# Patient Record
Sex: FEMALE | Race: WHITE | NOT HISPANIC OR LATINO | Employment: FULL TIME | URBAN - METROPOLITAN AREA
[De-identification: names, ages, dates, MRNs, and addresses within clinical notes are randomized per-mention and may not be internally consistent; named-entity substitution may affect disease eponyms.]

---

## 2017-01-11 ENCOUNTER — TELEPHONE (OUTPATIENT)
Dept: INTERNAL MEDICINE | Facility: CLINIC | Age: 47
End: 2017-01-11

## 2017-01-11 DIAGNOSIS — K21.9 GASTROESOPHAGEAL REFLUX DISEASE WITHOUT ESOPHAGITIS: Primary | ICD-10-CM

## 2017-03-24 DIAGNOSIS — I10 BENIGN ESSENTIAL HTN: ICD-10-CM

## 2017-03-24 RX ORDER — IRBESARTAN 150 MG/1
150 TABLET ORAL NIGHTLY
Qty: 30 TABLET | Refills: 5 | Status: SHIPPED | OUTPATIENT
Start: 2017-03-24 | End: 2017-11-20 | Stop reason: SDUPTHER

## 2017-03-24 NOTE — TELEPHONE ENCOUNTER
----- Message from Angelita Castaneda MA sent at 3/24/2017  1:27 PM CDT -----  Contact: Patient  Analia Apodaca  MRN: [id number]  : 1970  PCP: Shital Loera  Home Phone      [phone]  Work Phone      Not on file.  Mobile          [phone]      MESSAGE: Patient is out of refills on Avapro.  Send to [facility]

## 2017-04-10 DIAGNOSIS — E66.9 OBESITY (BMI 30-39.9): ICD-10-CM

## 2017-04-10 NOTE — TELEPHONE ENCOUNTER
----- Message from Angelita Castaneda MA sent at 4/10/2017  1:14 PM CDT -----  Contact: Patient  Analia Apodaca  MRN: [id number]  : 1970  PCP: Shital Loera  Home Phone      [phone]  Work Phone      Not on file.  Mobile          [phone]      MESSAGE: Patient is out of refills on Contrave.  Send new script to [facility]

## 2017-05-29 ENCOUNTER — CLINICAL SUPPORT (OUTPATIENT)
Dept: INTERNAL MEDICINE | Facility: CLINIC | Age: 47
End: 2017-05-29
Payer: COMMERCIAL

## 2017-05-29 DIAGNOSIS — E04.1 THYROID NODULE: ICD-10-CM

## 2017-05-29 DIAGNOSIS — I10 BENIGN ESSENTIAL HTN: Primary | ICD-10-CM

## 2017-05-29 LAB
ALBUMIN SERPL BCP-MCNC: 3.7 G/DL
ALP SERPL-CCNC: 96 U/L
ALT SERPL W/O P-5'-P-CCNC: 18 U/L
ANION GAP SERPL CALC-SCNC: 9 MMOL/L
AST SERPL-CCNC: 26 U/L
BASOPHILS # BLD AUTO: 0.03 K/UL
BASOPHILS NFR BLD: 0.9 %
BILIRUB SERPL-MCNC: 0.3 MG/DL
BUN SERPL-MCNC: 9 MG/DL
CALCIUM SERPL-MCNC: 9 MG/DL
CHLORIDE SERPL-SCNC: 107 MMOL/L
CHOLEST/HDLC SERPL: 3 {RATIO}
CO2 SERPL-SCNC: 25 MMOL/L
CREAT SERPL-MCNC: 0.9 MG/DL
DIFFERENTIAL METHOD: ABNORMAL
EOSINOPHIL # BLD AUTO: 0.1 K/UL
EOSINOPHIL NFR BLD: 1.6 %
ERYTHROCYTE [DISTWIDTH] IN BLOOD BY AUTOMATED COUNT: 14.7 %
EST. GFR  (AFRICAN AMERICAN): >60 ML/MIN/1.73 M^2
EST. GFR  (NON AFRICAN AMERICAN): >60 ML/MIN/1.73 M^2
GLUCOSE SERPL-MCNC: 83 MG/DL
HCT VFR BLD AUTO: 38.5 %
HDL/CHOLESTEROL RATIO: 33.3 %
HDLC SERPL-MCNC: 138 MG/DL
HDLC SERPL-MCNC: 46 MG/DL
HGB BLD-MCNC: 12.3 G/DL
LDLC SERPL CALC-MCNC: 80.2 MG/DL
LYMPHOCYTES # BLD AUTO: 1.4 K/UL
LYMPHOCYTES NFR BLD: 43.5 %
MCH RBC QN AUTO: 26.8 PG
MCHC RBC AUTO-ENTMCNC: 31.9 %
MCV RBC AUTO: 84 FL
MONOCYTES # BLD AUTO: 0.4 K/UL
MONOCYTES NFR BLD: 12.7 %
NEUTROPHILS # BLD AUTO: 1.3 K/UL
NEUTROPHILS NFR BLD: 41.3 %
NONHDLC SERPL-MCNC: 92 MG/DL
PLATELET # BLD AUTO: 184 K/UL
PMV BLD AUTO: 10.6 FL
POTASSIUM SERPL-SCNC: 3.8 MMOL/L
PROT SERPL-MCNC: 6.9 G/DL
RBC # BLD AUTO: 4.59 M/UL
SODIUM SERPL-SCNC: 141 MMOL/L
TRIGL SERPL-MCNC: 59 MG/DL
TSH SERPL DL<=0.005 MIU/L-ACNC: 2.37 UIU/ML
WBC # BLD AUTO: 3.22 K/UL

## 2017-05-29 PROCEDURE — 36415 COLL VENOUS BLD VENIPUNCTURE: CPT

## 2017-05-29 PROCEDURE — 80053 COMPREHEN METABOLIC PANEL: CPT

## 2017-05-29 PROCEDURE — 85025 COMPLETE CBC W/AUTO DIFF WBC: CPT

## 2017-05-29 PROCEDURE — 84443 ASSAY THYROID STIM HORMONE: CPT

## 2017-05-29 PROCEDURE — 80061 LIPID PANEL: CPT

## 2017-05-29 PROCEDURE — 36415 COLL VENOUS BLD VENIPUNCTURE: CPT | Mod: S$GLB,,, | Performed by: NURSE PRACTITIONER

## 2017-06-05 ENCOUNTER — OFFICE VISIT (OUTPATIENT)
Dept: INTERNAL MEDICINE | Facility: CLINIC | Age: 47
End: 2017-06-05
Payer: COMMERCIAL

## 2017-06-05 VITALS
RESPIRATION RATE: 20 BRPM | BODY MASS INDEX: 33.91 KG/M2 | DIASTOLIC BLOOD PRESSURE: 76 MMHG | WEIGHT: 211 LBS | HEART RATE: 60 BPM | HEIGHT: 66 IN | SYSTOLIC BLOOD PRESSURE: 114 MMHG | OXYGEN SATURATION: 97 %

## 2017-06-05 DIAGNOSIS — T63.441A BEE STING, ACCIDENTAL OR UNINTENTIONAL, INITIAL ENCOUNTER: Primary | ICD-10-CM

## 2017-06-05 PROCEDURE — 99213 OFFICE O/P EST LOW 20 MIN: CPT | Mod: S$GLB,,, | Performed by: NURSE PRACTITIONER

## 2017-06-05 PROCEDURE — 96372 THER/PROPH/DIAG INJ SC/IM: CPT | Mod: S$GLB,,, | Performed by: NURSE PRACTITIONER

## 2017-06-05 PROCEDURE — 99999 PR PBB SHADOW E&M-EST. PATIENT-LVL III: CPT | Mod: PBBFAC,,, | Performed by: NURSE PRACTITIONER

## 2017-06-05 RX ORDER — METHSCOPOLAMINE BROMIDE 2.5 MG/1
TABLET ORAL
COMMUNITY
Start: 2017-05-17 | End: 2018-12-18

## 2017-06-05 RX ORDER — FAMOTIDINE 40 MG/1
TABLET, FILM COATED ORAL
COMMUNITY
Start: 2017-06-01 | End: 2017-12-04 | Stop reason: SDUPTHER

## 2017-06-05 RX ORDER — NALTREXONE HYDROCHLORIDE AND BUPROPION HYDROCHLORIDE 8; 90 MG/1; MG/1
TABLET, EXTENDED RELEASE ORAL
COMMUNITY
Start: 2017-05-17 | End: 2017-12-04 | Stop reason: SDUPTHER

## 2017-06-05 RX ORDER — METHYLPREDNISOLONE ACETATE 80 MG/ML
80 INJECTION, SUSPENSION INTRA-ARTICULAR; INTRALESIONAL; INTRAMUSCULAR; SOFT TISSUE
Status: COMPLETED | OUTPATIENT
Start: 2017-06-05 | End: 2017-06-05

## 2017-06-05 RX ADMIN — METHYLPREDNISOLONE ACETATE 80 MG: 80 INJECTION, SUSPENSION INTRA-ARTICULAR; INTRALESIONAL; INTRAMUSCULAR; SOFT TISSUE at 07:06

## 2017-06-05 NOTE — PROGRESS NOTES
Subjective:       Patient ID: Analia Apodaca is a 46 y.o. female.    Chief Complaint: Insect Bite (bee sting on L. leg x saturday) and Results    Patient is known, to me and presents with   Chief Complaint   Patient presents with    Insect Bite     bee sting on L. leg x saturday    Results   .  Denies chest pain and shortness of breath.  Patient presents with bee sting to left lower leg since yesterday  HPI  Review of Systems   Constitutional: Negative.    HENT: Negative.    Eyes: Negative.    Respiratory: Negative.    Cardiovascular: Negative.    Skin: Positive for color change.       Objective:      Physical Exam   Constitutional: She appears well-developed and well-nourished. No distress.   HENT:   Head: Normocephalic and atraumatic.   Right Ear: External ear normal.   Left Ear: External ear normal.   Nose: Nose normal.   Mouth/Throat: Oropharynx is clear and moist. No oropharyngeal exudate.   Eyes: Conjunctivae and EOM are normal. Pupils are equal, round, and reactive to light. Right eye exhibits no discharge. Left eye exhibits no discharge. No scleral icterus.   Neck: Normal range of motion. Neck supple. No JVD present. No tracheal deviation present. No thyromegaly present.   Cardiovascular: Normal rate, regular rhythm, normal heart sounds and intact distal pulses.    No murmur heard.  Pulmonary/Chest: Effort normal and breath sounds normal. No stridor. She has no wheezes.   Lymphadenopathy:     She has no cervical adenopathy.   Skin: Skin is warm and dry. Capillary refill takes less than 2 seconds. No rash noted. She is not diaphoretic. There is erythema. No pallor.        Left lower leg swelling with redness with no impetigo       Assessment:       No diagnosis found.    Plan:   There are no diagnoses linked to this encounter.

## 2017-06-05 NOTE — PATIENT INSTRUCTIONS
Insect Sting: Local Reaction   You have been stung or bitten by an insect. The insects venom or body fluid is causing your skin to react in the area where you were stung or bitten. This often causes redness, itching and swelling. This reaction will fade over a few hours, but it can last a few days. An insect bite or sting can become infected 1 to 3 days later, so watch for the signs below. Sometimes it is hard to tell the difference between a local reaction to the insect bite or sting and an early infection, so you may be given antibiotics.  Common insect stings causing problems are from wasps, bees, yellow jackets, and hornets. Common bites are from spiders, mosquitoes, fleas, or ticks. Other types of insects may be more common in different parts of the country or world.  Most people think of allergic reactions when someone has a rash or itchy skin. Symptoms can include:  · Rash, hives, redness, welts, or blisters  · Itching, burning, stinging, or pain  · Swelling around the sting area.  Sometimes swelling spreads to other areas.  Home care  Medicines  The healthcare provider may prescribe medicines to relieve swelling, itching, and pain. Follow the providers instructions when taking these medicines.  · If you had a severe reaction, the provider may prescribe an epinephrine kit. Epinephrine will stop an allergic reaction from getting worse. Before you leave the hospital, be sure that you understand when and how to use this medicine.  · Diphenhydramine is an oral antihistamine available at drugstores and groceries. Unless a prescription antihistamine was given, you can use this medicine to reduce itching if large areas of the skin are involved. The medicine may make you sleepy, so be careful using it in the daytime or when going to school, working, or driving. Dont use diphenhydramine if you have glaucoma or if you are a man with trouble urinating because of an enlarged prostate. Other antihistamines cause less  drowsiness and are good choices for daytime use. Ask your pharmacist for suggestions.  · Dont use diphenhydramine cream on your skin. In some people it can cause additional reaction and make you allergic to this medicine.  · Calamine lotion or oatmeal baths sometimes help with itching.  · You may use acetaminophen or ibuprofen to control pain, unless another pain medicine was prescribed. Talk with your healthcare provider before using these medicines if you have chronic liver or kidney disease. Also talk with your provider if youve had a stomach ulcer or GI bleeding.  General care    · If itching is a problem, dont take hot showers or baths. Stay out of direct sunlight. These heat up your skin and will make the itching worse.  · Use an ice pack to reduce local areas of redness and itching. You can make your own ice pack by putting ice cubes in a bag that seals and wrapping it in a thin towel. Dont put the ice directly on your skin, because it can damage the skin.  · Try not to scratch any affected areas and damage the skin. This will help prevent an infection.  · If oral antibiotics were prescribed, be sure to take them until finished.  Preventing future reactions  · Future reactions could be worse than this one, so try to stay away from places where you might be stung again.  · Be aware that honeybees nest in trees. Wasps and yellow jackets nest in the ground, trees, or roof eaves.  · If you are stung by a honeybee, a stinger will remain in your skin. Wasps, yellow jackets, and hornets dont leave a stinger behind. Move away from the nest area right away. The stinger of a honeybee releases a substance that will attract other bees to you. Once you are away from the nest, then remove the stinger as quickly as possible.  · After any sting, you may apply ice and take diphenhydramine or another antihistamine. If you develop any of the warning signs below, seek help right away.  · If you are at high risk for another  sting, or if your reaction included dizziness, fainting, or trouble breathing or swallowing, ask your doctor for an insect allergy kit.  · Remove any ticks on the skin with a set of fine tweezers.  the tick as close to the skin as possible. Pull back gently but firmly. Use an even, steady pressure. Dont jerk or twist. Dont squeeze, crush, or puncture the body of the tick. The bodily fluids may contain infection-causing germs. Dont use a smoldering match or cigarette, nail polish, petroleum jelly, liquid soap, or kerosene. These may irritate the tick. If any mouthparts of the tick remain in the skin, these should be left alone. They will fall off on their own. Trying to remove these parts may damage the skin unless they can be removed very easily. After the tick is removed, wash the bite area with rubbing alcohol, iodine, or soap and water.  Follow-up care  Follow up with your doctor in 2 days, or as advised, if your symptoms dont start to get better.  Call 911  Call 911 if any of these occur:  · Trouble breathing or swallowing, or wheezing  · New or worsening swelling in the mouth, throat, or tongue  · Hoarse voice or trouble speaking  · Confused  · Very drowsy or trouble awakening  · Fainting or loss of consciousness  · Rapid heart rate  · Low blood pressure  · Feeling of doom  · Nausea, vomiting, abdominal pain, or diarrhea  · Vomiting blood, or large amounts of blood in stool  · Seizure  When to seek medical advice  Call your healthcare provider right away if any of these occur:  · Spreading areas of itching, redness or swelling  · New or worse swelling in the face, eyelids, or  lips  · Dizziness or weakness  Also call your provider right away if you have signs of infection:  · Spreading redness  · Increased pain or swelling  · Fever of 100.4°F (38°C) or higher, or as directed by your healthcare provider  · Colored fluid draining from the sting area   Date Last Reviewed: 10/1/2016  © 2271-2514 [employer]. [address] [place] [address], [place] [address]. All rights reserved. This information is not intended as a substitute for professional medical care. Always follow your healthcare professional's instructions.

## 2017-08-21 ENCOUNTER — OFFICE VISIT (OUTPATIENT)
Dept: INTERNAL MEDICINE | Facility: CLINIC | Age: 47
End: 2017-08-21
Payer: COMMERCIAL

## 2017-08-21 ENCOUNTER — HOSPITAL ENCOUNTER (OUTPATIENT)
Dept: RADIOLOGY | Facility: HOSPITAL | Age: 47
Discharge: HOME | End: 2017-08-21
Attending: NURSE PRACTITIONER
Payer: COMMERCIAL

## 2017-08-21 VITALS
SYSTOLIC BLOOD PRESSURE: 116 MMHG | DIASTOLIC BLOOD PRESSURE: 80 MMHG | WEIGHT: 214 LBS | RESPIRATION RATE: 20 BRPM | OXYGEN SATURATION: 97 % | HEART RATE: 81 BPM | HEIGHT: 66 IN | BODY MASS INDEX: 34.39 KG/M2

## 2017-08-21 DIAGNOSIS — M25.562 ACUTE PAIN OF LEFT KNEE: Primary | ICD-10-CM

## 2017-08-21 DIAGNOSIS — M25.562 ACUTE PAIN OF LEFT KNEE: ICD-10-CM

## 2017-08-21 DIAGNOSIS — M25.462 SWELLING OF JOINT OF LEFT KNEE: ICD-10-CM

## 2017-08-21 PROCEDURE — 3079F DIAST BP 80-89 MM HG: CPT | Mod: S$GLB,,, | Performed by: NURSE PRACTITIONER

## 2017-08-21 PROCEDURE — 3074F SYST BP LT 130 MM HG: CPT | Mod: S$GLB,,, | Performed by: NURSE PRACTITIONER

## 2017-08-21 PROCEDURE — 73560 X-RAY EXAM OF KNEE 1 OR 2: CPT | Mod: 26,LT,, | Performed by: INTERNAL MEDICINE

## 2017-08-21 PROCEDURE — 3008F BODY MASS INDEX DOCD: CPT | Mod: S$GLB,,, | Performed by: NURSE PRACTITIONER

## 2017-08-21 PROCEDURE — 99999 PR PBB SHADOW E&M-EST. PATIENT-LVL III: CPT | Mod: PBBFAC,,, | Performed by: NURSE PRACTITIONER

## 2017-08-21 PROCEDURE — 99213 OFFICE O/P EST LOW 20 MIN: CPT | Mod: S$GLB,,, | Performed by: NURSE PRACTITIONER

## 2017-08-21 PROCEDURE — 96372 THER/PROPH/DIAG INJ SC/IM: CPT | Mod: S$GLB,,, | Performed by: NURSE PRACTITIONER

## 2017-08-21 PROCEDURE — 73560 X-RAY EXAM OF KNEE 1 OR 2: CPT | Mod: TC,LT

## 2017-08-21 RX ORDER — METHYLPREDNISOLONE ACETATE 80 MG/ML
80 INJECTION, SUSPENSION INTRA-ARTICULAR; INTRALESIONAL; INTRAMUSCULAR; SOFT TISSUE
Status: COMPLETED | OUTPATIENT
Start: 2017-08-21 | End: 2017-08-21

## 2017-08-21 RX ORDER — KETOROLAC TROMETHAMINE 30 MG/ML
60 INJECTION, SOLUTION INTRAMUSCULAR; INTRAVENOUS
Status: COMPLETED | OUTPATIENT
Start: 2017-08-21 | End: 2017-08-21

## 2017-08-21 RX ADMIN — METHYLPREDNISOLONE ACETATE 80 MG: 80 INJECTION, SUSPENSION INTRA-ARTICULAR; INTRALESIONAL; INTRAMUSCULAR; SOFT TISSUE at 08:08

## 2017-08-21 RX ADMIN — KETOROLAC TROMETHAMINE 60 MG: 30 INJECTION, SOLUTION INTRAMUSCULAR; INTRAVENOUS at 08:08

## 2017-08-21 NOTE — PROGRESS NOTES
Subjective:       Patient ID: Analia Apodaca is a 47 y.o. female.    Chief Complaint: Knee Pain (L. knee pain x 1 week - cpnstant pain PS:10)    Patient is known, to me and presents with   Chief Complaint   Patient presents with    Knee Pain     L. knee pain x 1 week - cpnstant pain PS:10   .  Denies chest pain and shortness of breath.  Patient presents with left knee pain since starting to run again. Had stress fracture to right knee a year ago and now with pain to left knee. She is afraid that it may be same on left knee  HPI  Review of Systems   Constitutional: Negative.    Respiratory: Negative.    Cardiovascular: Negative.    Musculoskeletal: Positive for arthralgias and joint swelling.   Skin: Negative.    Neurological: Negative.        Objective:      Physical Exam   Constitutional: She is oriented to person, place, and time. She appears well-developed and well-nourished. No distress.   Neck: Normal range of motion. Neck supple. No JVD present. No tracheal deviation present. No thyromegaly present.   Cardiovascular: Normal rate, regular rhythm and normal heart sounds.    No murmur heard.  Pulmonary/Chest: Effort normal and breath sounds normal. No stridor. She has no wheezes.   Musculoskeletal: She exhibits edema and tenderness. She exhibits no deformity.        Left knee: She exhibits decreased range of motion and swelling. Tenderness found. Lateral joint line tenderness noted.   Tenderness and edema palpated to left knee particularly lateral regions. Crepitus noted    Lymphadenopathy:     She has no cervical adenopathy.   Neurological: She is alert and oriented to person, place, and time. She has normal reflexes. She displays normal reflexes. No cranial nerve deficit. She exhibits normal muscle tone. Coordination normal.   Skin: Skin is warm and dry. Capillary refill takes less than 2 seconds. No rash noted. She is not diaphoretic. No erythema. No pallor.       Assessment:       1. Acute pain of left knee   "  2. Swelling of joint of left knee        Plan:   Analia was seen today for knee pain.    Diagnoses and all orders for this visit:    Acute pain of left knee  -     X-Ray Knee 1 or 2 View Left; Future  -     ketorolac injection 60 mg; Inject 60 mg into the muscle one time.  -     methylPREDNISolone acetate injection 80 mg; Inject 1 mL (80 mg total) into the muscle one time.    Swelling of joint of left knee  -     X-Ray Knee 1 or 2 View Left; Future  -     ketorolac injection 60 mg; Inject 60 mg into the muscle one time.  -     methylPREDNISolone acetate injection 80 mg; Inject 1 mL (80 mg total) into the muscle one time.    "This note will not be shared with the patient."  Will do x-ray to rule out stress fracture although I feel it is collateral ligament strain  Instructed on RICE  Continue with nsaid's for now  rtc as scheduled   "

## 2017-09-25 ENCOUNTER — OFFICE VISIT (OUTPATIENT)
Dept: INTERNAL MEDICINE | Facility: CLINIC | Age: 47
End: 2017-09-25
Payer: COMMERCIAL

## 2017-09-25 VITALS
HEART RATE: 95 BPM | OXYGEN SATURATION: 95 % | RESPIRATION RATE: 18 BRPM | DIASTOLIC BLOOD PRESSURE: 86 MMHG | TEMPERATURE: 100 F | HEIGHT: 66 IN | BODY MASS INDEX: 34.87 KG/M2 | SYSTOLIC BLOOD PRESSURE: 122 MMHG | WEIGHT: 217 LBS

## 2017-09-25 DIAGNOSIS — J98.8 VIRAL RESPIRATORY INFECTION: Primary | ICD-10-CM

## 2017-09-25 DIAGNOSIS — B97.89 VIRAL RESPIRATORY INFECTION: Primary | ICD-10-CM

## 2017-09-25 LAB
CTP QC/QA: YES
FLUAV AG NPH QL: NEGATIVE
FLUBV AG NPH QL: NEGATIVE

## 2017-09-25 PROCEDURE — 3074F SYST BP LT 130 MM HG: CPT | Mod: S$GLB,,, | Performed by: NURSE PRACTITIONER

## 2017-09-25 PROCEDURE — 3079F DIAST BP 80-89 MM HG: CPT | Mod: S$GLB,,, | Performed by: NURSE PRACTITIONER

## 2017-09-25 PROCEDURE — 3008F BODY MASS INDEX DOCD: CPT | Mod: S$GLB,,, | Performed by: NURSE PRACTITIONER

## 2017-09-25 PROCEDURE — 99213 OFFICE O/P EST LOW 20 MIN: CPT | Mod: S$GLB,,, | Performed by: NURSE PRACTITIONER

## 2017-09-25 PROCEDURE — 96372 THER/PROPH/DIAG INJ SC/IM: CPT | Mod: S$GLB,,, | Performed by: NURSE PRACTITIONER

## 2017-09-25 PROCEDURE — 99999 PR PBB SHADOW E&M-EST. PATIENT-LVL III: CPT | Mod: PBBFAC,,, | Performed by: NURSE PRACTITIONER

## 2017-09-25 PROCEDURE — 87804 INFLUENZA ASSAY W/OPTIC: CPT | Mod: QW,S$GLB,, | Performed by: NURSE PRACTITIONER

## 2017-09-25 RX ORDER — BENZONATATE 100 MG/1
100 CAPSULE ORAL 3 TIMES DAILY PRN
Qty: 30 CAPSULE | Refills: 0 | Status: SHIPPED | OUTPATIENT
Start: 2017-09-25 | End: 2017-10-05

## 2017-09-25 RX ORDER — METHYLPREDNISOLONE ACETATE 80 MG/ML
80 INJECTION, SUSPENSION INTRA-ARTICULAR; INTRALESIONAL; INTRAMUSCULAR; SOFT TISSUE
Status: COMPLETED | OUTPATIENT
Start: 2017-09-25 | End: 2017-09-25

## 2017-09-25 RX ADMIN — METHYLPREDNISOLONE ACETATE 80 MG: 80 INJECTION, SUSPENSION INTRA-ARTICULAR; INTRALESIONAL; INTRAMUSCULAR; SOFT TISSUE at 09:09

## 2017-09-25 NOTE — PROGRESS NOTES
Subjective:       Patient ID: Analia Apodaca is a 47 y.o. female.    Chief Complaint: Cough (x 2 days)    Patient is known, to me and presents with   Chief Complaint   Patient presents with    Cough     x 2 days   .  Denies chest pain and shortness of breath.  Presents with fever and dry cough since over the weekend.    HPI  Review of Systems   Constitutional: Positive for chills and fever.   HENT: Positive for congestion and postnasal drip.    Eyes: Negative.    Respiratory: Positive for cough.    Cardiovascular: Negative.    Skin: Negative.    Hematological: Negative.        Objective:      Physical Exam   Constitutional: She appears well-developed and well-nourished. No distress.   HENT:   Head: Normocephalic and atraumatic.   Right Ear: Tympanic membrane mobility is abnormal.   Left Ear: Tympanic membrane mobility is abnormal.   Nose: Mucosal edema present.   Mouth/Throat: No oropharyngeal exudate or posterior oropharyngeal erythema.   Eyes: Conjunctivae are normal. Right eye exhibits no discharge. Left eye exhibits no discharge. No scleral icterus.   Neck: Normal range of motion. Neck supple. No JVD present. No tracheal deviation present. No thyromegaly present.   Cardiovascular: Normal rate, regular rhythm and normal heart sounds.    No murmur heard.  Pulmonary/Chest: Effort normal and breath sounds normal. No stridor. She has no wheezes.   Lymphadenopathy:     She has no cervical adenopathy.   Skin: Skin is warm and dry. Capillary refill takes less than 2 seconds. No rash noted. She is not diaphoretic. No erythema. No pallor.       Assessment:       1. Viral respiratory infection        Plan:   Analia was seen today for cough.    Diagnoses and all orders for this visit:    Viral respiratory infection  -     POCT Influenza A/B  -     methylPREDNISolone acetate injection 80 mg; Inject 1 mL (80 mg total) into the muscle one time.  -     benzonatate (TESSALON) 100 MG capsule; Take 1 capsule (100 mg total) by  "mouth 3 (three) times daily as needed.    "This note will not be shared with the patient."  Keep hydrated  Flu is negative  rtc as scheduled  "

## 2017-10-11 ENCOUNTER — TELEPHONE (OUTPATIENT)
Dept: INTERNAL MEDICINE | Facility: CLINIC | Age: 47
End: 2017-10-11

## 2017-10-11 NOTE — TELEPHONE ENCOUNTER
----- Message from Angelita Castaneda MA sent at 10/11/2017 10:27 AM CDT -----  Contact: Patient  Analia Apodaca  MRN: [id number]  : 1970  PCP: Shital Loera  Home Phone      [phone]  Work Phone      Not on file.  Mobile          [phone]      MESSAGE: Patient is requesting a letter to go to her employer stating that she be allowed to wear [employer] tennis shoes to work due to Plantar Fasciitis. She says this is the only shoes she can wear to work in that does not cause any foot pain.    Please advise--[phone]

## 2017-10-12 NOTE — TELEPHONE ENCOUNTER
Type it up and I will sign (Routing comment) per YUNG Loera NP/LRLPN    Letter typed and ready for . Patient notified.

## 2017-10-16 ENCOUNTER — TELEPHONE (OUTPATIENT)
Dept: INTERNAL MEDICINE | Facility: CLINIC | Age: 47
End: 2017-10-16

## 2017-10-16 NOTE — TELEPHONE ENCOUNTER
----- Message from Angelita Castaneda MA sent at 10/16/2017  3:07 PM CDT -----  Contact: Patient  Analia Apodaca  MRN: [id number]  : 1970  PCP: Shital Loera  Home Phone      [phone]  Work Phone      Not on file.  Mobile          [phone]      MESSAGE: Patient was seen on 2017 for Viral URI. She says she has the same symptoms again.  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   She gutted her bathroom where there was insulation. Wants to know if Shital feels she should maybe have a chest xray. She is worried that something in her home is causing this.   Please Advise--[phone]

## 2017-10-17 NOTE — TELEPHONE ENCOUNTER
I don't see the need to expose her to unnecessary radiation and a chest x-ray really wouldn't  allergy related problems.  (Routing comment) per YUNG Loera NP/LRLPN.    Patient notified.

## 2017-11-08 ENCOUNTER — TELEPHONE (OUTPATIENT)
Dept: OBSTETRICS AND GYNECOLOGY | Facility: CLINIC | Age: 47
End: 2017-11-08

## 2017-11-08 DIAGNOSIS — Z12.31 ENCOUNTER FOR SCREENING MAMMOGRAM FOR BREAST CANCER: Primary | ICD-10-CM

## 2017-11-08 NOTE — TELEPHONE ENCOUNTER
----- Message from Geraldine Varner MA sent at 11/8/2017 11:20 AM CST -----  Contact: patient   Analia Apodaca  MRN: [id number]  Home Phone      [phone]  Work Phone      Not on file.  Mobile          [phone]    Patient Care Team:  Shital Loera NP as PCP - General (Family Medicine)  Yoshi Boucher MD as Obstetrician (Obstetrics)  OB? No  What phone number can you be reached at? [phone]  Message: Please link pt mammo order to mammo appt that is set for 12/14/2017 @ 8:30am.

## 2017-11-20 DIAGNOSIS — I10 BENIGN ESSENTIAL HTN: ICD-10-CM

## 2017-11-20 RX ORDER — IRBESARTAN 150 MG/1
TABLET ORAL
Qty: 30 TABLET | Refills: 4 | Status: SHIPPED | OUTPATIENT
Start: 2017-11-20 | End: 2018-05-14 | Stop reason: SDUPTHER

## 2017-12-04 ENCOUNTER — TELEPHONE (OUTPATIENT)
Dept: INTERNAL MEDICINE | Facility: CLINIC | Age: 47
End: 2017-12-04

## 2017-12-04 RX ORDER — FAMOTIDINE 40 MG/1
40 TABLET, FILM COATED ORAL NIGHTLY PRN
Qty: 30 TABLET | Refills: 5 | Status: SHIPPED | OUTPATIENT
Start: 2017-12-04 | End: 2018-12-18

## 2017-12-04 RX ORDER — NALTREXONE HYDROCHLORIDE AND BUPROPION HYDROCHLORIDE 8; 90 MG/1; MG/1
2 TABLET, EXTENDED RELEASE ORAL 2 TIMES DAILY
Qty: 120 TABLET | Refills: 2 | Status: SHIPPED | OUTPATIENT
Start: 2017-12-04 | End: 2018-03-19 | Stop reason: SDUPTHER

## 2017-12-04 NOTE — TELEPHONE ENCOUNTER
----- Message from Angelita Castaneda MA sent at 2017  3:04 PM CST -----  Contact: Patient  Analia Apodaca  MRN: [id number]  : 1970  PCP: Shital Loera  Home Phone      [phone]  Work Phone      Not on file.  Mobile          [phone]      MESSAGE: Patient needs refills on Contrave and Pepcid,   Send to [facility]

## 2017-12-14 ENCOUNTER — OFFICE VISIT (OUTPATIENT)
Dept: OBSTETRICS AND GYNECOLOGY | Facility: CLINIC | Age: 47
End: 2017-12-14
Payer: COMMERCIAL

## 2017-12-14 ENCOUNTER — HOSPITAL ENCOUNTER (OUTPATIENT)
Dept: RADIOLOGY | Facility: HOSPITAL | Age: 47
Discharge: HOME | End: 2017-12-14
Attending: OBSTETRICS & GYNECOLOGY
Payer: COMMERCIAL

## 2017-12-14 VITALS — BODY MASS INDEX: 34.87 KG/M2 | HEIGHT: 66 IN | WEIGHT: 217 LBS

## 2017-12-14 VITALS
WEIGHT: 219 LBS | DIASTOLIC BLOOD PRESSURE: 68 MMHG | HEIGHT: 66 IN | BODY MASS INDEX: 35.2 KG/M2 | HEART RATE: 80 BPM | SYSTOLIC BLOOD PRESSURE: 110 MMHG | RESPIRATION RATE: 18 BRPM

## 2017-12-14 DIAGNOSIS — Z12.31 ENCOUNTER FOR SCREENING MAMMOGRAM FOR BREAST CANCER: ICD-10-CM

## 2017-12-14 DIAGNOSIS — Z12.4 CERVICAL CANCER SCREENING: ICD-10-CM

## 2017-12-14 DIAGNOSIS — Z01.419 ENCOUNTER FOR GYNECOLOGICAL EXAMINATION: Primary | ICD-10-CM

## 2017-12-14 PROCEDURE — 77063 BREAST TOMOSYNTHESIS BI: CPT | Mod: 26,,, | Performed by: RADIOLOGY

## 2017-12-14 PROCEDURE — 88175 CYTOPATH C/V AUTO FLUID REDO: CPT

## 2017-12-14 PROCEDURE — 99396 PREV VISIT EST AGE 40-64: CPT | Mod: S$GLB,,, | Performed by: OBSTETRICS & GYNECOLOGY

## 2017-12-14 PROCEDURE — 77067 SCR MAMMO BI INCL CAD: CPT | Mod: TC

## 2017-12-14 PROCEDURE — 77067 SCR MAMMO BI INCL CAD: CPT | Mod: 26,,, | Performed by: RADIOLOGY

## 2017-12-14 PROCEDURE — 99999 PR PBB SHADOW E&M-EST. PATIENT-LVL III: CPT | Mod: PBBFAC,,, | Performed by: OBSTETRICS & GYNECOLOGY

## 2017-12-14 RX ORDER — PROMETHAZINE HYDROCHLORIDE AND DEXTROMETHORPHAN HYDROBROMIDE 6.25; 15 MG/5ML; MG/5ML
SYRUP ORAL
Refills: 0 | COMMUNITY
Start: 2017-11-24 | End: 2018-11-14

## 2017-12-14 RX ORDER — ALBUTEROL SULFATE 90 UG/1
AEROSOL, METERED RESPIRATORY (INHALATION)
Refills: 0 | COMMUNITY
Start: 2017-11-24 | End: 2018-12-18

## 2017-12-14 NOTE — PROGRESS NOTES
Subjective:       Patient ID: Analia Apodaca is a 47 y.o. female.    Chief Complaint:  Annual Exam (No complaints at present)      History of Present Illness  Patient presents for annual exam.  Patient Mitz to having a mammogram done this morning.  She is currently without GYN complaints.    Menstrual History:  OB History      Para Term  AB Living    1 1 1     1    SAB TAB Ectopic Multiple Live Births                      Menarche age:   Patient's last menstrual period was 2016.         Review of Systems  Review of Systems   Constitutional: Positive for fatigue. Negative for activity change, appetite change, chills, diaphoresis, fever and unexpected weight change.   HENT: Negative for congestion, dental problem, drooling, ear discharge, ear pain, facial swelling, hearing loss, mouth sores, nosebleeds, postnasal drip, rhinorrhea, sinus pressure, sneezing, sore throat, tinnitus, trouble swallowing and voice change.    Eyes: Negative for photophobia, pain, discharge, redness, itching and visual disturbance.   Respiratory: Positive for cough. Negative for apnea, choking, chest tightness, shortness of breath, wheezing and stridor.    Cardiovascular: Negative for chest pain, palpitations and leg swelling.   Gastrointestinal: Negative for abdominal distention, abdominal pain, anal bleeding, blood in stool, constipation, diarrhea, nausea, rectal pain and vomiting.   Endocrine: Negative for cold intolerance, heat intolerance, polydipsia, polyphagia and polyuria.   Genitourinary: Negative for decreased urine volume, difficulty urinating, dyspareunia, dysuria, enuresis, flank pain, frequency, genital sores, hematuria, menstrual problem, pelvic pain, urgency, vaginal bleeding, vaginal discharge and vaginal pain.   Musculoskeletal: Negative for arthralgias, back pain, gait problem, joint swelling, myalgias, neck pain and neck stiffness.   Skin: Negative for color change, pallor, rash and wound.    Allergic/Immunologic: Negative for environmental allergies, food allergies and immunocompromised state.   Neurological: Positive for headaches. Negative for dizziness, tremors, seizures, syncope, facial asymmetry, speech difficulty, weakness, light-headedness and numbness.   Hematological: Negative for adenopathy. Does not bruise/bleed easily.   Psychiatric/Behavioral: Negative for agitation, behavioral problems, confusion, decreased concentration, dysphoric mood, hallucinations, self-injury, sleep disturbance and suicidal ideas. The patient is not nervous/anxious and is not hyperactive.            Objective:    Physical Exam   Constitutional: She is oriented to person, place, and time. She appears well-developed and well-nourished.   Neck: No thyromegaly present.   Cardiovascular: Normal rate and regular rhythm.    Pulmonary/Chest: Effort normal and breath sounds normal. Right breast exhibits no inverted nipple, no mass, no nipple discharge, no skin change and no tenderness. Left breast exhibits no inverted nipple, no mass, no nipple discharge, no skin change and no tenderness. Breasts are symmetrical.   Abdominal: Soft. Bowel sounds are normal. She exhibits no mass. There is no tenderness. Hernia confirmed negative in the right inguinal area and confirmed negative in the left inguinal area.   Genitourinary: Vagina normal and uterus normal. Rectal exam shows no external hemorrhoid. No breast tenderness or discharge. Uterus is not enlarged and not tender. Cervix exhibits no motion tenderness, no discharge and no friability. Right adnexum displays no mass, no tenderness and no fullness. Left adnexum displays no mass, no tenderness and no fullness. No tenderness in the vagina. No foreign body in the vagina. No vaginal discharge found.   Musculoskeletal: Normal range of motion.   Lymphadenopathy:        Right: No inguinal adenopathy present.        Left: No inguinal adenopathy present.   Neurological: She is alert  and oriented to person, place, and time. She has normal reflexes.   Skin: Skin is dry.   Psychiatric: She has a normal mood and affect. Her behavior is normal. Judgment and thought content normal.   Nursing note and vitals reviewed.        Assessment:        1. Encounter for gynecological examination    2. Cervical cancer screening               Plan:        Analia was seen today for annual exam.    Diagnoses and all orders for this visit:    Encounter for gynecological examination    Cervical cancer screening  -     Liquid-based pap smear, screening

## 2018-03-19 RX ORDER — NALTREXONE HYDROCHLORIDE AND BUPROPION HYDROCHLORIDE 8; 90 MG/1; MG/1
TABLET, EXTENDED RELEASE ORAL
Qty: 120 TABLET | Refills: 1 | Status: SHIPPED | OUTPATIENT
Start: 2018-03-19 | End: 2018-11-14

## 2018-04-19 ENCOUNTER — TELEPHONE (OUTPATIENT)
Dept: INTERNAL MEDICINE | Facility: CLINIC | Age: 48
End: 2018-04-19

## 2018-04-19 NOTE — TELEPHONE ENCOUNTER
This is viral and antibx will not work. I suggest taking claritin and flonase at this time. Take tylenol and alternate with motrin for fever.

## 2018-04-19 NOTE — TELEPHONE ENCOUNTER
----- Message from Angelita Castaneda MA sent at 2018  8:32 AM CDT -----  Contact: patient  Analia Apodaca  MRN: [id number]  : 1970  PCP: Shital Loera  Home Phone      [phone]  Work Phone      Not on file.  Mobile          [phone]      MESSAGE:   Pt requesting meds for sorethroat and fever.   Patient is working out of town and will not be back until tonite, cannot come in for a visit.   Pharmacy name and location:  [facility]  Comments:      Phone:  [phone]

## 2018-04-24 ENCOUNTER — LAB VISIT (OUTPATIENT)
Dept: LAB | Facility: HOSPITAL | Age: 48
End: 2018-04-24
Attending: INTERNAL MEDICINE
Payer: COMMERCIAL

## 2018-04-24 DIAGNOSIS — Z79.899 HIGH RISK MEDICATION USE: Primary | ICD-10-CM

## 2018-04-24 LAB
ALBUMIN SERPL BCP-MCNC: 3.6 G/DL
ALP SERPL-CCNC: 83 U/L
ALT SERPL W/O P-5'-P-CCNC: 16 U/L
ANION GAP SERPL CALC-SCNC: 8 MMOL/L
AST SERPL-CCNC: 14 U/L
BILIRUB SERPL-MCNC: 0.6 MG/DL
BUN SERPL-MCNC: 12 MG/DL
CALCIUM SERPL-MCNC: 9.2 MG/DL
CHLORIDE SERPL-SCNC: 108 MMOL/L
CHOLEST SERPL-MCNC: 153 MG/DL
CHOLEST/HDLC SERPL: 3.1 {RATIO}
CO2 SERPL-SCNC: 27 MMOL/L
CREAT SERPL-MCNC: 0.9 MG/DL
EST. GFR  (AFRICAN AMERICAN): >60 ML/MIN/1.73 M^2
EST. GFR  (NON AFRICAN AMERICAN): >60 ML/MIN/1.73 M^2
GLUCOSE SERPL-MCNC: 95 MG/DL
HDLC SERPL-MCNC: 50 MG/DL
HDLC SERPL: 32.7 %
LDLC SERPL CALC-MCNC: 90.2 MG/DL
NONHDLC SERPL-MCNC: 103 MG/DL
POTASSIUM SERPL-SCNC: 3.6 MMOL/L
PROT SERPL-MCNC: 6.6 G/DL
SODIUM SERPL-SCNC: 143 MMOL/L
TRIGL SERPL-MCNC: 64 MG/DL
TSH SERPL DL<=0.005 MIU/L-ACNC: 1.5 UIU/ML

## 2018-04-24 PROCEDURE — 84443 ASSAY THYROID STIM HORMONE: CPT

## 2018-04-24 PROCEDURE — 80061 LIPID PANEL: CPT

## 2018-04-24 PROCEDURE — 36415 COLL VENOUS BLD VENIPUNCTURE: CPT

## 2018-04-24 PROCEDURE — 80053 COMPREHEN METABOLIC PANEL: CPT

## 2018-05-14 DIAGNOSIS — I10 BENIGN ESSENTIAL HTN: ICD-10-CM

## 2018-05-14 RX ORDER — IRBESARTAN 150 MG/1
TABLET ORAL
Qty: 30 TABLET | Refills: 3 | Status: SHIPPED | OUTPATIENT
Start: 2018-05-14 | End: 2018-09-25 | Stop reason: SDUPTHER

## 2018-09-25 DIAGNOSIS — I10 BENIGN ESSENTIAL HTN: ICD-10-CM

## 2018-09-25 RX ORDER — IRBESARTAN 150 MG/1
TABLET ORAL
Qty: 30 TABLET | Refills: 3 | Status: SHIPPED | OUTPATIENT
Start: 2018-09-25 | End: 2019-02-12 | Stop reason: SDUPTHER

## 2018-11-06 ENCOUNTER — TELEPHONE (OUTPATIENT)
Dept: OBSTETRICS AND GYNECOLOGY | Facility: CLINIC | Age: 48
End: 2018-11-06

## 2018-11-06 DIAGNOSIS — Z12.31 ENCOUNTER FOR SCREENING MAMMOGRAM FOR BREAST CANCER: Primary | ICD-10-CM

## 2018-11-06 NOTE — TELEPHONE ENCOUNTER
----- Message from Kristina Loyola MA sent at 11/6/2018  1:09 PM CST -----  Contact: self  Analia Apodaca  MRN: [id number]  Home Phone      [phone]  Work Phone      Not on file.  Mobile          [phone]    Patient Care Team:  Shital Loera NP as PCP - General (Family Medicine)  Yoshi Boucher MD as Obstetrician (Obstetrics)  OB? No  What phone number can you be reached at?  [phone]  Message:   Please link mammo orders to appt 12/18/18.  Thanks!

## 2018-11-14 ENCOUNTER — OFFICE VISIT (OUTPATIENT)
Dept: INTERNAL MEDICINE | Facility: CLINIC | Age: 48
End: 2018-11-14
Payer: COMMERCIAL

## 2018-11-14 VITALS
RESPIRATION RATE: 18 BRPM | WEIGHT: 220 LBS | TEMPERATURE: 98 F | SYSTOLIC BLOOD PRESSURE: 114 MMHG | HEART RATE: 85 BPM | HEIGHT: 66 IN | OXYGEN SATURATION: 99 % | BODY MASS INDEX: 35.36 KG/M2 | DIASTOLIC BLOOD PRESSURE: 80 MMHG

## 2018-11-14 DIAGNOSIS — H92.01 RIGHT EAR PAIN: Primary | ICD-10-CM

## 2018-11-14 PROCEDURE — 3008F BODY MASS INDEX DOCD: CPT | Mod: CPTII,S$GLB,, | Performed by: INTERNAL MEDICINE

## 2018-11-14 PROCEDURE — 3074F SYST BP LT 130 MM HG: CPT | Mod: CPTII,S$GLB,, | Performed by: INTERNAL MEDICINE

## 2018-11-14 PROCEDURE — 99999 PR PBB SHADOW E&M-EST. PATIENT-LVL III: CPT | Mod: PBBFAC,,, | Performed by: INTERNAL MEDICINE

## 2018-11-14 PROCEDURE — 99213 OFFICE O/P EST LOW 20 MIN: CPT | Mod: S$GLB,,, | Performed by: INTERNAL MEDICINE

## 2018-11-14 PROCEDURE — 3079F DIAST BP 80-89 MM HG: CPT | Mod: CPTII,S$GLB,, | Performed by: INTERNAL MEDICINE

## 2018-11-14 RX ORDER — OMEPRAZOLE 40 MG/1
40 CAPSULE, DELAYED RELEASE ORAL DAILY
COMMUNITY
End: 2021-02-18
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
## 2018-12-18 ENCOUNTER — OFFICE VISIT (OUTPATIENT)
Dept: OBSTETRICS AND GYNECOLOGY | Facility: CLINIC | Age: 48
End: 2018-12-18
Payer: COMMERCIAL

## 2018-12-18 VITALS
WEIGHT: 213 LBS | HEIGHT: 66 IN | HEART RATE: 79 BPM | SYSTOLIC BLOOD PRESSURE: 102 MMHG | DIASTOLIC BLOOD PRESSURE: 62 MMHG | RESPIRATION RATE: 12 BRPM | BODY MASS INDEX: 34.23 KG/M2

## 2018-12-18 DIAGNOSIS — Z01.419 ENCOUNTER FOR GYNECOLOGICAL EXAMINATION WITHOUT ABNORMAL FINDING: Primary | ICD-10-CM

## 2018-12-18 DIAGNOSIS — Z78.0 POSTMENOPAUSAL STATE: ICD-10-CM

## 2018-12-18 DIAGNOSIS — Z12.4 CERVICAL CANCER SCREENING: ICD-10-CM

## 2018-12-18 PROCEDURE — 88175 CYTOPATH C/V AUTO FLUID REDO: CPT

## 2018-12-18 PROCEDURE — 3074F SYST BP LT 130 MM HG: CPT | Mod: CPTII,S$GLB,, | Performed by: OBSTETRICS & GYNECOLOGY

## 2018-12-18 PROCEDURE — 99999 PR PBB SHADOW E&M-EST. PATIENT-LVL III: CPT | Mod: PBBFAC,,, | Performed by: OBSTETRICS & GYNECOLOGY

## 2018-12-18 PROCEDURE — 99396 PREV VISIT EST AGE 40-64: CPT | Mod: S$GLB,,, | Performed by: OBSTETRICS & GYNECOLOGY

## 2018-12-18 PROCEDURE — 3078F DIAST BP <80 MM HG: CPT | Mod: CPTII,S$GLB,, | Performed by: OBSTETRICS & GYNECOLOGY

## 2018-12-18 NOTE — PROGRESS NOTES
Subjective:       Patient ID: Analia Apodaca is a 48 y.o. female.    Chief Complaint:  Well Woman      History of Present Illness   patient presents for annual exam.  Patient is scheduled have a mammogram done next week.  She is otherwise without gyn complaints.    Menstrual History:  OB History      Para Term  AB Living    1 1 1     1    SAB TAB Ectopic Multiple Live Births                      Menarche age:   Patient's last menstrual period was 2016.         Review of Systems  Review of Systems   Constitutional: Negative for activity change, appetite change, chills, diaphoresis, fatigue, fever and unexpected weight change.   HENT: Negative for congestion, dental problem, drooling, ear discharge, ear pain, facial swelling, hearing loss, mouth sores, nosebleeds, postnasal drip, rhinorrhea, sinus pressure, sneezing, sore throat, tinnitus, trouble swallowing and voice change.    Eyes: Negative for photophobia, pain, discharge, redness, itching and visual disturbance.   Respiratory: Negative for apnea, cough, choking, chest tightness, shortness of breath, wheezing and stridor.    Cardiovascular: Negative for chest pain, palpitations and leg swelling.   Gastrointestinal: Negative for abdominal distention, abdominal pain, anal bleeding, blood in stool, constipation, diarrhea, nausea, rectal pain and vomiting.   Endocrine: Negative for cold intolerance, heat intolerance, polydipsia, polyphagia and polyuria.   Genitourinary: Negative for decreased urine volume, difficulty urinating, dyspareunia, dysuria, enuresis, flank pain, frequency, genital sores, hematuria, menstrual problem, pelvic pain, urgency, vaginal bleeding, vaginal discharge and vaginal pain.   Musculoskeletal: Negative for arthralgias, back pain, gait problem, joint swelling, myalgias, neck pain and neck stiffness.   Skin: Negative for color change, pallor, rash and wound.   Allergic/Immunologic: Negative for environmental allergies, food  allergies and immunocompromised state.   Neurological: Negative for dizziness, tremors, seizures, syncope, facial asymmetry, speech difficulty, weakness, light-headedness, numbness and headaches.   Hematological: Negative for adenopathy. Does not bruise/bleed easily.   Psychiatric/Behavioral: Negative for agitation, behavioral problems, confusion, decreased concentration, dysphoric mood, hallucinations, self-injury, sleep disturbance and suicidal ideas. The patient is not nervous/anxious and is not hyperactive.            Objective:      Physical Exam   Constitutional: She is oriented to person, place, and time. She appears well-developed and well-nourished.   Neck: No thyromegaly present.   Cardiovascular: Normal rate and regular rhythm.   Pulmonary/Chest: Effort normal and breath sounds normal. Right breast exhibits no inverted nipple, no mass, no nipple discharge, no skin change and no tenderness. Left breast exhibits no inverted nipple, no mass, no nipple discharge, no skin change and no tenderness. Breasts are symmetrical.   Abdominal: Soft. Bowel sounds are normal. She exhibits no mass. There is no tenderness. Hernia confirmed negative in the right inguinal area and confirmed negative in the left inguinal area.   Genitourinary: Vagina normal and uterus normal. Rectal exam shows no external hemorrhoid. No breast tenderness or discharge. Uterus is not enlarged and not tender. Cervix exhibits no motion tenderness, no discharge and no friability. Right adnexum displays no mass, no tenderness and no fullness. Left adnexum displays no mass, no tenderness and no fullness. No tenderness in the vagina. No foreign body in the vagina. No vaginal discharge found.   Musculoskeletal: Normal range of motion.   Lymphadenopathy:        Right: No inguinal adenopathy present.        Left: No inguinal adenopathy present.   Neurological: She is alert and oriented to person, place, and time. She has normal reflexes.   Skin: Skin is  dry.   Psychiatric: She has a normal mood and affect. Her behavior is normal. Judgment and thought content normal.   Nursing note and vitals reviewed.          Assessment:        1. Encounter for gynecological examination without abnormal finding    2. Cervical cancer screening    3. Postmenopausal state                Plan:         Analia was seen today for well woman.    Diagnoses and all orders for this visit:    Encounter for gynecological examination without abnormal finding    Cervical cancer screening  -     Liquid-based pap smear, screening    Postmenopausal state

## 2019-01-10 ENCOUNTER — HOSPITAL ENCOUNTER (OUTPATIENT)
Dept: RADIOLOGY | Facility: HOSPITAL | Age: 49
Discharge: HOME | End: 2019-01-10
Attending: OBSTETRICS & GYNECOLOGY
Payer: COMMERCIAL

## 2019-01-10 DIAGNOSIS — Z12.31 ENCOUNTER FOR SCREENING MAMMOGRAM FOR BREAST CANCER: ICD-10-CM

## 2019-01-10 PROCEDURE — 77067 MAMMO DIGITAL SCREENING BILAT WITH TOMOSYNTHESIS_CAD: ICD-10-PCS | Mod: 26,,, | Performed by: RADIOLOGY

## 2019-01-10 PROCEDURE — 77063 MAMMO DIGITAL SCREENING BILAT WITH TOMOSYNTHESIS_CAD: ICD-10-PCS | Mod: 26,,, | Performed by: RADIOLOGY

## 2019-01-10 PROCEDURE — 77063 BREAST TOMOSYNTHESIS BI: CPT | Mod: 26,,, | Performed by: RADIOLOGY

## 2019-01-10 PROCEDURE — 77067 SCR MAMMO BI INCL CAD: CPT | Mod: TC

## 2019-01-10 PROCEDURE — 77067 SCR MAMMO BI INCL CAD: CPT | Mod: 26,,, | Performed by: RADIOLOGY

## 2019-01-11 ENCOUNTER — TELEPHONE (OUTPATIENT)
Dept: RADIOLOGY | Facility: HOSPITAL | Age: 49
End: 2019-01-11

## 2019-01-21 ENCOUNTER — HOSPITAL ENCOUNTER (OUTPATIENT)
Dept: RADIOLOGY | Facility: HOSPITAL | Age: 49
Discharge: HOME | End: 2019-01-21
Attending: OBSTETRICS & GYNECOLOGY
Payer: COMMERCIAL

## 2019-01-21 DIAGNOSIS — R92.8 ABNORMAL MAMMOGRAM: ICD-10-CM

## 2019-01-21 PROCEDURE — 77061 BREAST TOMOSYNTHESIS UNI: CPT | Mod: 26,LT,, | Performed by: RADIOLOGY

## 2019-01-21 PROCEDURE — 77061 BREAST TOMOSYNTHESIS UNI: CPT | Mod: TC,LT

## 2019-01-21 PROCEDURE — 77065 DX MAMMO INCL CAD UNI: CPT | Mod: TC,LT

## 2019-01-21 PROCEDURE — 77065 DX MAMMO INCL CAD UNI: CPT | Mod: 26,LT,, | Performed by: RADIOLOGY

## 2019-01-21 PROCEDURE — 77065 MAMMO DIGITAL DIAGNOSTIC LEFT WITH TOMOSYNTHESIS_CAD: ICD-10-PCS | Mod: 26,LT,, | Performed by: RADIOLOGY

## 2019-01-21 PROCEDURE — 77061 MAMMO DIGITAL DIAGNOSTIC LEFT WITH TOMOSYNTHESIS_CAD: ICD-10-PCS | Mod: 26,LT,, | Performed by: RADIOLOGY

## 2019-01-28 ENCOUNTER — HOSPITAL ENCOUNTER (OUTPATIENT)
Dept: RADIOLOGY | Facility: HOSPITAL | Age: 49
Discharge: HOME | End: 2019-01-28
Attending: NURSE PRACTITIONER
Payer: COMMERCIAL

## 2019-01-28 ENCOUNTER — OFFICE VISIT (OUTPATIENT)
Dept: INTERNAL MEDICINE | Facility: CLINIC | Age: 49
End: 2019-01-28
Payer: COMMERCIAL

## 2019-01-28 VITALS
HEART RATE: 102 BPM | OXYGEN SATURATION: 97 % | TEMPERATURE: 101 F | BODY MASS INDEX: 33.75 KG/M2 | DIASTOLIC BLOOD PRESSURE: 70 MMHG | SYSTOLIC BLOOD PRESSURE: 112 MMHG | WEIGHT: 210 LBS | HEIGHT: 66 IN | RESPIRATION RATE: 16 BRPM

## 2019-01-28 DIAGNOSIS — J11.1 INFLUENZA-LIKE SYNDROME: Primary | ICD-10-CM

## 2019-01-28 DIAGNOSIS — J11.1 INFLUENZA-LIKE SYNDROME: ICD-10-CM

## 2019-01-28 PROCEDURE — 3074F SYST BP LT 130 MM HG: CPT | Mod: CPTII,S$GLB,, | Performed by: NURSE PRACTITIONER

## 2019-01-28 PROCEDURE — 99213 PR OFFICE/OUTPT VISIT, EST, LEVL III, 20-29 MIN: ICD-10-PCS | Mod: S$GLB,,, | Performed by: NURSE PRACTITIONER

## 2019-01-28 PROCEDURE — 3078F PR MOST RECENT DIASTOLIC BLOOD PRESSURE < 80 MM HG: ICD-10-PCS | Mod: CPTII,S$GLB,, | Performed by: NURSE PRACTITIONER

## 2019-01-28 PROCEDURE — 3008F BODY MASS INDEX DOCD: CPT | Mod: CPTII,S$GLB,, | Performed by: NURSE PRACTITIONER

## 2019-01-28 PROCEDURE — 3074F PR MOST RECENT SYSTOLIC BLOOD PRESSURE < 130 MM HG: ICD-10-PCS | Mod: CPTII,S$GLB,, | Performed by: NURSE PRACTITIONER

## 2019-01-28 PROCEDURE — 3078F DIAST BP <80 MM HG: CPT | Mod: CPTII,S$GLB,, | Performed by: NURSE PRACTITIONER

## 2019-01-28 PROCEDURE — 99999 PR PBB SHADOW E&M-EST. PATIENT-LVL IV: CPT | Mod: PBBFAC,,, | Performed by: NURSE PRACTITIONER

## 2019-01-28 PROCEDURE — 71046 X-RAY EXAM CHEST 2 VIEWS: CPT | Mod: TC

## 2019-01-28 PROCEDURE — 99999 PR PBB SHADOW E&M-EST. PATIENT-LVL IV: ICD-10-PCS | Mod: PBBFAC,,, | Performed by: NURSE PRACTITIONER

## 2019-01-28 PROCEDURE — 99213 OFFICE O/P EST LOW 20 MIN: CPT | Mod: S$GLB,,, | Performed by: NURSE PRACTITIONER

## 2019-01-28 PROCEDURE — 3008F PR BODY MASS INDEX (BMI) DOCUMENTED: ICD-10-PCS | Mod: CPTII,S$GLB,, | Performed by: NURSE PRACTITIONER

## 2019-01-28 RX ORDER — OSELTAMIVIR PHOSPHATE 75 MG/1
75 CAPSULE ORAL 2 TIMES DAILY
Qty: 10 CAPSULE | Refills: 0 | Status: SHIPPED | OUTPATIENT
Start: 2019-01-28 | End: 2019-02-02

## 2019-01-28 NOTE — PATIENT INSTRUCTIONS

## 2019-01-29 ENCOUNTER — TELEPHONE (OUTPATIENT)
Dept: INTERNAL MEDICINE | Facility: CLINIC | Age: 49
End: 2019-01-29

## 2019-01-29 DIAGNOSIS — R91.8 GROUND GLASS OPACITY PRESENT ON IMAGING OF LUNG: Primary | ICD-10-CM

## 2019-01-29 NOTE — TELEPHONE ENCOUNTER
----- Message from Angelita Castaneda MA sent at 1/29/2019  8:14 AM CST -----  Contact: Patient  Patient is calling for results of chest xray from yesterday.     Please Advise.

## 2019-01-29 NOTE — TELEPHONE ENCOUNTER
What is showing on her x-ray is not so much of an infection but shows that she has some opacities in her lower lung fields. Usually this is seen in someone that has been exposed to certain things such as asbestos or other chemicals that can cause this. I would like to set her up with pulmonology since I don't know if she has been having this for some time or this is new. We can set her up with [facility]ology

## 2019-01-29 NOTE — TELEPHONE ENCOUNTER
Pt notified of dr note/recommendation of results, voiced understanding staing she is good with [facility] just need referral put in  please advise  Thanks!

## 2019-02-12 DIAGNOSIS — I10 BENIGN ESSENTIAL HTN: ICD-10-CM

## 2019-02-12 RX ORDER — IRBESARTAN 150 MG/1
TABLET ORAL
Qty: 30 TABLET | Refills: 3 | Status: SHIPPED | OUTPATIENT
Start: 2019-02-12 | End: 2019-06-25 | Stop reason: SDUPTHER

## 2019-03-25 RX ORDER — FAMOTIDINE 40 MG/1
TABLET, FILM COATED ORAL
Qty: 30 TABLET | Refills: 5 | Status: SHIPPED | OUTPATIENT
Start: 2019-03-25 | End: 2020-02-11

## 2019-05-03 ENCOUNTER — OFFICE VISIT (OUTPATIENT)
Dept: INTERNAL MEDICINE | Facility: CLINIC | Age: 49
End: 2019-05-03
Payer: COMMERCIAL

## 2019-05-03 ENCOUNTER — HOSPITAL ENCOUNTER (OUTPATIENT)
Dept: RADIOLOGY | Facility: HOSPITAL | Age: 49
Discharge: HOME | End: 2019-05-03
Attending: NURSE PRACTITIONER
Payer: COMMERCIAL

## 2019-05-03 VITALS
DIASTOLIC BLOOD PRESSURE: 80 MMHG | HEIGHT: 66 IN | HEART RATE: 86 BPM | RESPIRATION RATE: 16 BRPM | WEIGHT: 201.94 LBS | BODY MASS INDEX: 32.45 KG/M2 | OXYGEN SATURATION: 95 % | SYSTOLIC BLOOD PRESSURE: 110 MMHG

## 2019-05-03 DIAGNOSIS — G89.29 CHRONIC LEFT SHOULDER PAIN: Primary | ICD-10-CM

## 2019-05-03 DIAGNOSIS — G89.29 CHRONIC LEFT SHOULDER PAIN: ICD-10-CM

## 2019-05-03 DIAGNOSIS — M25.512 CHRONIC LEFT SHOULDER PAIN: ICD-10-CM

## 2019-05-03 DIAGNOSIS — M25.512 CHRONIC LEFT SHOULDER PAIN: Primary | ICD-10-CM

## 2019-05-03 PROCEDURE — 99213 PR OFFICE/OUTPT VISIT, EST, LEVL III, 20-29 MIN: ICD-10-PCS | Mod: S$GLB,,, | Performed by: NURSE PRACTITIONER

## 2019-05-03 PROCEDURE — 3008F BODY MASS INDEX DOCD: CPT | Mod: CPTII,S$GLB,, | Performed by: NURSE PRACTITIONER

## 2019-05-03 PROCEDURE — 3008F PR BODY MASS INDEX (BMI) DOCUMENTED: ICD-10-PCS | Mod: CPTII,S$GLB,, | Performed by: NURSE PRACTITIONER

## 2019-05-03 PROCEDURE — 3074F SYST BP LT 130 MM HG: CPT | Mod: CPTII,S$GLB,, | Performed by: NURSE PRACTITIONER

## 2019-05-03 PROCEDURE — 3079F DIAST BP 80-89 MM HG: CPT | Mod: CPTII,S$GLB,, | Performed by: NURSE PRACTITIONER

## 2019-05-03 PROCEDURE — 99213 OFFICE O/P EST LOW 20 MIN: CPT | Mod: S$GLB,,, | Performed by: NURSE PRACTITIONER

## 2019-05-03 PROCEDURE — 3079F PR MOST RECENT DIASTOLIC BLOOD PRESSURE 80-89 MM HG: ICD-10-PCS | Mod: CPTII,S$GLB,, | Performed by: NURSE PRACTITIONER

## 2019-05-03 PROCEDURE — 73030 X-RAY EXAM OF SHOULDER: CPT | Mod: TC,LT

## 2019-05-03 PROCEDURE — 99999 PR PBB SHADOW E&M-EST. PATIENT-LVL III: ICD-10-PCS | Mod: PBBFAC,,, | Performed by: NURSE PRACTITIONER

## 2019-05-03 PROCEDURE — 99999 PR PBB SHADOW E&M-EST. PATIENT-LVL III: CPT | Mod: PBBFAC,,, | Performed by: NURSE PRACTITIONER

## 2019-05-03 PROCEDURE — 3074F PR MOST RECENT SYSTOLIC BLOOD PRESSURE < 130 MM HG: ICD-10-PCS | Mod: CPTII,S$GLB,, | Performed by: NURSE PRACTITIONER

## 2019-05-03 NOTE — PROGRESS NOTES
"Subjective:       Patient ID: Analia Apodaca is a 48 y.o. female.    Chief Complaint: Pain (L arm pain )    Patient is known, to me and presents with   Chief Complaint   Patient presents with    Pain     L arm pain    .  Denies chest pain and shortness of breath.  Patient presents with left shoulder pain for the past months and is not improving. Not trying any med's for relief  HPI  Review of Systems   Constitutional: Negative.    Respiratory: Negative.    Cardiovascular: Negative.    Musculoskeletal: Positive for arthralgias.   Skin: Negative.    Neurological: Negative.        Objective:      Physical Exam   Constitutional: She is oriented to person, place, and time. She appears well-developed and well-nourished. No distress.   Neck: Normal range of motion. Neck supple. No JVD present. No tracheal deviation present. No thyromegaly present.   Cardiovascular: Normal rate, regular rhythm and normal heart sounds.   No murmur heard.  Pulmonary/Chest: Effort normal and breath sounds normal. She has no wheezes.   Musculoskeletal: She exhibits tenderness. She exhibits no edema or deformity.        Left shoulder: She exhibits decreased range of motion, tenderness and pain.        Arms:  Lymphadenopathy:     She has no cervical adenopathy.   Neurological: She is alert and oriented to person, place, and time. She displays normal reflexes. No cranial nerve deficit or sensory deficit. She exhibits normal muscle tone. Coordination normal.   Skin: Skin is warm and dry. Capillary refill takes less than 2 seconds. No rash noted. She is not diaphoretic. No erythema. No pallor.       Assessment:       1. Chronic left shoulder pain        Plan:   Analia was seen today for pain.    Diagnoses and all orders for this visit:    Chronic left shoulder pain  -     X-Ray Shoulder 2 or More Views Left; Future    "This note will not be shared with the patient."  Will x-ray left shoulder and decide if PT or send to ortho  rtc as scheduled   "

## 2019-05-06 ENCOUNTER — CLINICAL SUPPORT (OUTPATIENT)
Dept: INTERNAL MEDICINE | Facility: CLINIC | Age: 49
End: 2019-05-06
Payer: COMMERCIAL

## 2019-05-06 DIAGNOSIS — Z00.00 WELLNESS EXAMINATION: Primary | ICD-10-CM

## 2019-05-06 LAB
ALBUMIN SERPL BCP-MCNC: 3.6 G/DL (ref 3.5–5.2)
ALP SERPL-CCNC: 89 U/L (ref 55–135)
ALT SERPL W/O P-5'-P-CCNC: 13 U/L (ref 10–44)
ANION GAP SERPL CALC-SCNC: 7 MMOL/L (ref 8–16)
AST SERPL-CCNC: 23 U/L (ref 10–40)
BASOPHILS # BLD AUTO: 0.05 K/UL (ref 0–0.2)
BASOPHILS NFR BLD: 0.9 % (ref 0–1.9)
BILIRUB SERPL-MCNC: 0.4 MG/DL (ref 0.1–1)
BUN SERPL-MCNC: 11 MG/DL (ref 6–20)
CALCIUM SERPL-MCNC: 9.8 MG/DL (ref 8.7–10.5)
CHLORIDE SERPL-SCNC: 105 MMOL/L (ref 95–110)
CHOLEST SERPL-MCNC: 144 MG/DL (ref 120–199)
CHOLEST/HDLC SERPL: 2.7 {RATIO} (ref 2–5)
CO2 SERPL-SCNC: 29 MMOL/L (ref 23–29)
CREAT SERPL-MCNC: 0.8 MG/DL (ref 0.5–1.4)
DIFFERENTIAL METHOD: ABNORMAL
EOSINOPHIL # BLD AUTO: 0.2 K/UL (ref 0–0.5)
EOSINOPHIL NFR BLD: 4.3 % (ref 0–8)
ERYTHROCYTE [DISTWIDTH] IN BLOOD BY AUTOMATED COUNT: 15 % (ref 11.5–14.5)
EST. GFR  (AFRICAN AMERICAN): >60 ML/MIN/1.73 M^2
EST. GFR  (NON AFRICAN AMERICAN): >60 ML/MIN/1.73 M^2
GLUCOSE SERPL-MCNC: 80 MG/DL (ref 70–110)
HCT VFR BLD AUTO: 39.4 % (ref 37–48.5)
HDLC SERPL-MCNC: 54 MG/DL (ref 40–75)
HDLC SERPL: 37.5 % (ref 20–50)
HGB BLD-MCNC: 12 G/DL (ref 12–16)
IMM GRANULOCYTES # BLD AUTO: 0.01 K/UL (ref 0–0.04)
IMM GRANULOCYTES NFR BLD AUTO: 0.2 % (ref 0–0.5)
LDLC SERPL CALC-MCNC: 74.6 MG/DL (ref 63–159)
LYMPHOCYTES # BLD AUTO: 1.9 K/UL (ref 1–4.8)
LYMPHOCYTES NFR BLD: 35.3 % (ref 18–48)
MCH RBC QN AUTO: 24.8 PG (ref 27–31)
MCHC RBC AUTO-ENTMCNC: 30.5 G/DL (ref 32–36)
MCV RBC AUTO: 82 FL (ref 82–98)
MONOCYTES # BLD AUTO: 0.7 K/UL (ref 0.3–1)
MONOCYTES NFR BLD: 12.4 % (ref 4–15)
NEUTROPHILS # BLD AUTO: 2.5 K/UL (ref 1.8–7.7)
NEUTROPHILS NFR BLD: 46.9 % (ref 38–73)
NONHDLC SERPL-MCNC: 90 MG/DL
NRBC BLD-RTO: 0 /100 WBC
PLATELET # BLD AUTO: 240 K/UL (ref 150–350)
PMV BLD AUTO: 10.8 FL (ref 9.2–12.9)
POTASSIUM SERPL-SCNC: 3.7 MMOL/L (ref 3.5–5.1)
PROT SERPL-MCNC: 6.7 G/DL (ref 6–8.4)
RBC # BLD AUTO: 4.83 M/UL (ref 4–5.4)
SODIUM SERPL-SCNC: 141 MMOL/L (ref 136–145)
TRIGL SERPL-MCNC: 77 MG/DL (ref 30–150)
TSH SERPL DL<=0.005 MIU/L-ACNC: 2.24 UIU/ML (ref 0.4–4)
WBC # BLD AUTO: 5.41 K/UL (ref 3.9–12.7)

## 2019-05-06 PROCEDURE — 85025 COMPLETE CBC W/AUTO DIFF WBC: CPT

## 2019-05-06 PROCEDURE — 36415 COLL VENOUS BLD VENIPUNCTURE: CPT

## 2019-05-06 PROCEDURE — 80053 COMPREHEN METABOLIC PANEL: CPT

## 2019-05-06 PROCEDURE — 80061 LIPID PANEL: CPT

## 2019-05-06 PROCEDURE — 84443 ASSAY THYROID STIM HORMONE: CPT

## 2019-05-07 ENCOUNTER — TELEPHONE (OUTPATIENT)
Dept: INTERNAL MEDICINE | Facility: CLINIC | Age: 49
End: 2019-05-07

## 2019-05-07 DIAGNOSIS — G89.29 CHRONIC LEFT SHOULDER PAIN: Primary | ICD-10-CM

## 2019-05-07 DIAGNOSIS — M25.512 CHRONIC LEFT SHOULDER PAIN: Primary | ICD-10-CM

## 2019-05-10 ENCOUNTER — TELEPHONE (OUTPATIENT)
Dept: INTERNAL MEDICINE | Facility: CLINIC | Age: 49
End: 2019-05-10

## 2019-05-10 DIAGNOSIS — D64.9 ANEMIA, UNSPECIFIED TYPE: Primary | ICD-10-CM

## 2019-05-10 NOTE — TELEPHONE ENCOUNTER
Blood work looks good but looks like she might have iron def according to blood work. So we would need to have iron studies done some time next week

## 2019-05-10 NOTE — TELEPHONE ENCOUNTER
----- Message from Angelita Castaneda MA sent at 5/10/2019  8:18 AM CDT -----  Contact: Patient  Patient is calling regarding her bloodwork results.     Please Call  [phone]

## 2019-05-13 ENCOUNTER — LAB VISIT (OUTPATIENT)
Dept: LAB | Facility: HOSPITAL | Age: 49
End: 2019-05-13
Attending: NURSE PRACTITIONER
Payer: COMMERCIAL

## 2019-05-13 DIAGNOSIS — D64.9 ANEMIA, UNSPECIFIED TYPE: ICD-10-CM

## 2019-05-13 LAB
IRON SERPL-MCNC: 43 UG/DL (ref 30–160)
RETICS/RBC NFR AUTO: 1.2 % (ref 0.5–2.5)
SATURATED IRON: 11 % (ref 20–50)
TOTAL IRON BINDING CAPACITY: 377 UG/DL (ref 250–450)
TRANSFERRIN SERPL-MCNC: 255 MG/DL (ref 200–375)

## 2019-05-13 PROCEDURE — 36415 COLL VENOUS BLD VENIPUNCTURE: CPT

## 2019-05-13 PROCEDURE — 83540 ASSAY OF IRON: CPT

## 2019-05-13 PROCEDURE — 85045 AUTOMATED RETICULOCYTE COUNT: CPT

## 2019-05-20 ENCOUNTER — TELEPHONE (OUTPATIENT)
Dept: INTERNAL MEDICINE | Facility: CLINIC | Age: 49
End: 2019-05-20

## 2019-05-20 NOTE — TELEPHONE ENCOUNTER
----- Message from Angelita Castaneda MA sent at 5/20/2019 11:04 AM CDT -----  Contact: Patient  Patient is calling for bloodwork results.     Please have Shital review and call [phone]

## 2019-05-20 NOTE — TELEPHONE ENCOUNTER
Her iron is low so would need to get on iron supplementation take one tablet daily and repeat in one month

## 2019-06-20 ENCOUNTER — CLINICAL SUPPORT (OUTPATIENT)
Dept: INTERNAL MEDICINE | Facility: CLINIC | Age: 49
End: 2019-06-20
Payer: COMMERCIAL

## 2019-06-20 DIAGNOSIS — E61.1 IRON DEFICIENCY: Primary | ICD-10-CM

## 2019-06-20 LAB
IRON SERPL-MCNC: 76 UG/DL (ref 30–160)
SATURATED IRON: 19 % (ref 20–50)
TOTAL IRON BINDING CAPACITY: 400 UG/DL (ref 250–450)
TRANSFERRIN SERPL-MCNC: 270 MG/DL (ref 200–375)

## 2019-06-20 PROCEDURE — 83540 ASSAY OF IRON: CPT

## 2019-06-20 PROCEDURE — 36415 PR COLLECTION VENOUS BLOOD,VENIPUNCTURE: ICD-10-PCS | Mod: S$GLB,,, | Performed by: NURSE PRACTITIONER

## 2019-06-20 PROCEDURE — 36415 COLL VENOUS BLD VENIPUNCTURE: CPT | Mod: S$GLB,,, | Performed by: NURSE PRACTITIONER

## 2019-06-25 DIAGNOSIS — I10 BENIGN ESSENTIAL HTN: ICD-10-CM

## 2019-06-25 RX ORDER — IRBESARTAN 150 MG/1
TABLET ORAL
Qty: 30 TABLET | Refills: 3 | Status: SHIPPED | OUTPATIENT
Start: 2019-06-25 | End: 2019-08-18 | Stop reason: SDUPTHER

## 2019-07-18 ENCOUNTER — TELEPHONE (OUTPATIENT)
Dept: INTERNAL MEDICINE | Facility: CLINIC | Age: 49
End: 2019-07-18

## 2019-07-18 RX ORDER — SCOLOPAMINE TRANSDERMAL SYSTEM 1 MG/1
1 PATCH, EXTENDED RELEASE TRANSDERMAL
Qty: 10 PATCH | Refills: 0 | Status: SHIPPED | OUTPATIENT
Start: 2019-07-18 | End: 2020-02-11

## 2019-07-18 NOTE — TELEPHONE ENCOUNTER
Pt is leaving to go to [place], she has hx of vertigo, she is requesting patch to help with this.     [facility]

## 2019-08-18 DIAGNOSIS — I10 BENIGN ESSENTIAL HTN: ICD-10-CM

## 2019-08-19 RX ORDER — IRBESARTAN 150 MG/1
TABLET ORAL
Qty: 30 TABLET | Refills: 3 | Status: SHIPPED | OUTPATIENT
Start: 2019-08-19 | End: 2019-11-17 | Stop reason: SDUPTHER

## 2019-08-23 DIAGNOSIS — Z12.11 COLON CANCER SCREENING: ICD-10-CM

## 2019-11-17 DIAGNOSIS — I10 BENIGN ESSENTIAL HTN: ICD-10-CM

## 2019-11-18 RX ORDER — IRBESARTAN 150 MG/1
TABLET ORAL
Qty: 90 TABLET | Refills: 1 | Status: SHIPPED | OUTPATIENT
Start: 2019-11-18 | End: 2020-05-18

## 2019-12-19 ENCOUNTER — TELEPHONE (OUTPATIENT)
Dept: OBSTETRICS AND GYNECOLOGY | Facility: CLINIC | Age: 49
End: 2019-12-19

## 2019-12-19 DIAGNOSIS — Z12.39 BREAST CANCER SCREENING: Primary | ICD-10-CM

## 2019-12-19 NOTE — TELEPHONE ENCOUNTER
----- Message from Irene Sharma MA sent at 12/19/2019 12:40 PM CST -----  Contact: Self      ----- Message -----  From: Susan Renae  Sent: 12/19/2019  12:34 PM CST  To: Sander [facility] Staff    Please link mammo orders. Annual scheduled 1/24/20

## 2020-01-21 ENCOUNTER — PATIENT OUTREACH (OUTPATIENT)
Dept: ADMINISTRATIVE | Facility: OTHER | Age: 50
End: 2020-01-21

## 2020-01-24 ENCOUNTER — HOSPITAL ENCOUNTER (OUTPATIENT)
Dept: RADIOLOGY | Facility: HOSPITAL | Age: 50
Discharge: HOME | End: 2020-01-24
Attending: OBSTETRICS & GYNECOLOGY
Payer: COMMERCIAL

## 2020-01-24 ENCOUNTER — OFFICE VISIT (OUTPATIENT)
Dept: OBSTETRICS AND GYNECOLOGY | Facility: CLINIC | Age: 50
End: 2020-01-24
Payer: COMMERCIAL

## 2020-01-24 VITALS — BODY MASS INDEX: 34.55 KG/M2 | HEIGHT: 66 IN | WEIGHT: 215 LBS

## 2020-01-24 VITALS
SYSTOLIC BLOOD PRESSURE: 110 MMHG | RESPIRATION RATE: 18 BRPM | HEART RATE: 80 BPM | WEIGHT: 215 LBS | DIASTOLIC BLOOD PRESSURE: 72 MMHG | BODY MASS INDEX: 34.55 KG/M2 | HEIGHT: 66 IN

## 2020-01-24 DIAGNOSIS — Z12.4 CERVICAL CANCER SCREENING: ICD-10-CM

## 2020-01-24 DIAGNOSIS — Z12.39 BREAST CANCER SCREENING: ICD-10-CM

## 2020-01-24 DIAGNOSIS — Z12.11 COLON CANCER SCREENING: ICD-10-CM

## 2020-01-24 DIAGNOSIS — Z01.419 ENCOUNTER FOR GYNECOLOGICAL EXAMINATION WITHOUT ABNORMAL FINDING: Primary | ICD-10-CM

## 2020-01-24 PROCEDURE — 77063 MAMMO DIGITAL SCREENING BILAT WITH TOMOSYNTHESIS_CAD: ICD-10-PCS | Mod: 26,,, | Performed by: RADIOLOGY

## 2020-01-24 PROCEDURE — 3074F SYST BP LT 130 MM HG: CPT | Mod: CPTII,S$GLB,, | Performed by: OBSTETRICS & GYNECOLOGY

## 2020-01-24 PROCEDURE — 99999 PR PBB SHADOW E&M-EST. PATIENT-LVL III: CPT | Mod: PBBFAC,,, | Performed by: OBSTETRICS & GYNECOLOGY

## 2020-01-24 PROCEDURE — 77067 SCR MAMMO BI INCL CAD: CPT | Mod: TC

## 2020-01-24 PROCEDURE — 3078F DIAST BP <80 MM HG: CPT | Mod: CPTII,S$GLB,, | Performed by: OBSTETRICS & GYNECOLOGY

## 2020-01-24 PROCEDURE — 3074F PR MOST RECENT SYSTOLIC BLOOD PRESSURE < 130 MM HG: ICD-10-PCS | Mod: CPTII,S$GLB,, | Performed by: OBSTETRICS & GYNECOLOGY

## 2020-01-24 PROCEDURE — 77063 BREAST TOMOSYNTHESIS BI: CPT | Mod: 26,,, | Performed by: RADIOLOGY

## 2020-01-24 PROCEDURE — 3078F PR MOST RECENT DIASTOLIC BLOOD PRESSURE < 80 MM HG: ICD-10-PCS | Mod: CPTII,S$GLB,, | Performed by: OBSTETRICS & GYNECOLOGY

## 2020-01-24 PROCEDURE — 99999 PR PBB SHADOW E&M-EST. PATIENT-LVL III: ICD-10-PCS | Mod: PBBFAC,,, | Performed by: OBSTETRICS & GYNECOLOGY

## 2020-01-24 PROCEDURE — 88175 CYTOPATH C/V AUTO FLUID REDO: CPT

## 2020-01-24 PROCEDURE — 99396 PR PREVENTIVE VISIT,EST,40-64: ICD-10-PCS | Mod: S$GLB,,, | Performed by: OBSTETRICS & GYNECOLOGY

## 2020-01-24 PROCEDURE — 77067 MAMMO DIGITAL SCREENING BILAT WITH TOMOSYNTHESIS_CAD: ICD-10-PCS | Mod: 26,,, | Performed by: RADIOLOGY

## 2020-01-24 PROCEDURE — 99396 PREV VISIT EST AGE 40-64: CPT | Mod: S$GLB,,, | Performed by: OBSTETRICS & GYNECOLOGY

## 2020-01-24 PROCEDURE — 77067 SCR MAMMO BI INCL CAD: CPT | Mod: 26,,, | Performed by: RADIOLOGY

## 2020-01-24 NOTE — PROGRESS NOTES
Subjective:       Patient ID: Analia Apodaca is a 49 y.o. female.    Chief Complaint:  Well Woman      History of Present Illness  Patient presents for annual exam.  Patient is scheduled to have her mammogram done today.  Patient would like to schedule colonoscopy.  She is otherwise without complaints.    Menstrual History:  OB History        1    Para   1    Term   1            AB        Living   1       SAB        TAB        Ectopic        Multiple        Live Births   1                Menarche age:  Patient's last menstrual period was 2016.         Review of Systems  Review of Systems   Constitutional: Negative for activity change, appetite change, chills, diaphoresis, fatigue, fever and unexpected weight change.   HENT: Negative for congestion, dental problem, drooling, ear discharge, ear pain, facial swelling, hearing loss, mouth sores, nosebleeds, postnasal drip, rhinorrhea, sinus pressure, sneezing, sore throat, tinnitus, trouble swallowing and voice change.    Eyes: Negative for photophobia, pain, discharge, redness, itching and visual disturbance.   Respiratory: Negative for apnea, cough, choking, chest tightness, shortness of breath, wheezing and stridor.    Cardiovascular: Negative for chest pain, palpitations and leg swelling.   Gastrointestinal: Negative for abdominal distention, abdominal pain, anal bleeding, blood in stool, constipation, diarrhea, nausea, rectal pain and vomiting.   Endocrine: Negative for cold intolerance, heat intolerance, polydipsia, polyphagia and polyuria.   Genitourinary: Negative for decreased urine volume, difficulty urinating, dyspareunia, dysuria, enuresis, flank pain, frequency, genital sores, hematuria, menstrual problem, pelvic pain, urgency, vaginal bleeding, vaginal discharge and vaginal pain.   Musculoskeletal: Negative for arthralgias, back pain, gait problem, joint swelling, myalgias, neck pain and neck stiffness.   Skin: Negative for color  change, pallor, rash and wound.   Allergic/Immunologic: Negative for environmental allergies, food allergies and immunocompromised state.   Neurological: Negative for dizziness, tremors, seizures, syncope, facial asymmetry, speech difficulty, weakness, light-headedness, numbness and headaches.   Hematological: Negative for adenopathy. Does not bruise/bleed easily.   Psychiatric/Behavioral: Negative for agitation, behavioral problems, confusion, decreased concentration, dysphoric mood, hallucinations, self-injury, sleep disturbance and suicidal ideas. The patient is not nervous/anxious and is not hyperactive.            Objective:      Physical Exam   Constitutional: She is oriented to person, place, and time. She appears well-developed and well-nourished.   Neck: No thyromegaly present.   Cardiovascular: Normal rate and regular rhythm.   Pulmonary/Chest: Effort normal and breath sounds normal. Right breast exhibits no inverted nipple, no mass, no nipple discharge, no skin change and no tenderness. Left breast exhibits no inverted nipple, no mass, no nipple discharge, no skin change and no tenderness. No breast tenderness or discharge. Breasts are symmetrical.   Abdominal: Soft. Bowel sounds are normal. She exhibits no mass. There is no tenderness. Hernia confirmed negative in the right inguinal area and confirmed negative in the left inguinal area.   Genitourinary: Vagina normal and uterus normal. Rectal exam shows no external hemorrhoid. No breast tenderness or discharge. Uterus is not enlarged and not tender. Cervix exhibits no motion tenderness, no discharge and no friability. Right adnexum displays no mass, no tenderness and no fullness. Left adnexum displays no mass, no tenderness and no fullness. No tenderness in the vagina. No foreign body in the vagina. No vaginal discharge found.   Musculoskeletal: Normal range of motion.   Lymphadenopathy:        Right: No inguinal adenopathy present.        Left: No  inguinal adenopathy present.   Neurological: She is alert and oriented to person, place, and time. She has normal reflexes.   Skin: Skin is dry.   Psychiatric: She has a normal mood and affect. Her behavior is normal. Judgment and thought content normal.   Nursing note and vitals reviewed.          Assessment:        1. Encounter for gynecological examination without abnormal finding    2. Cervical cancer screening    3. Colon cancer screening                Plan:         Analia was seen today for well woman.    Diagnoses and all orders for this visit:    Encounter for gynecological examination without abnormal finding    Cervical cancer screening  -     Liquid-Based Pap Smear, Screening    Colon cancer screening  -     Case request GI: COLONOSCOPY

## 2020-02-11 ENCOUNTER — HOSPITAL ENCOUNTER (OUTPATIENT)
Dept: PREADMISSION TESTING | Facility: HOSPITAL | Age: 50
Discharge: HOME | End: 2020-02-11
Attending: INTERNAL MEDICINE
Payer: COMMERCIAL

## 2020-02-11 RX ORDER — SODIUM, POTASSIUM,MAG SULFATES 17.5-3.13G
354 SOLUTION, RECONSTITUTED, ORAL ORAL ONCE
Qty: 354 ML | Refills: 0 | Status: SHIPPED | OUTPATIENT
Start: 2020-02-11 | End: 2020-02-12

## 2020-02-11 NOTE — DISCHARGE INSTRUCTIONS
SUPREP Instructions    You are scheduled for a colonoscopy with  ____________ on __________________.    To ensure that your test is accurate and complete, you MUST follow these instructions listed below. If you have any questions, please call our office at [phone]. Plan on being at the hospital for your procedure for 3-4 hours.    1. Follow a CLEAR LIQUID DIET for the entire day before your scheduled colonoscopy. This means no solid food the entire day starting when you wake. You may have as much of the clear liquids as you want throughout the day.    CLEAR LIQUID DIET:    - Avoid Red, Orange, Purple, and/or Blue food coloring    - NO DAIRY    - You can have: Coffee with sugar (no creamer), tea, water, soda, apple or white grape juice, chicken or beef broth/bouillon (no meat, noodles, or veggies), green/yellow popsicles, green/yellow Jell-O, lemonade.    2. AT 5 pm the evening before your colonoscopy, POUR ONE (1) BOTTLE OF SUPREP INTO THE MIXING CONTAINER, PROVIDED INSIDE THE BOX. ADD WATER TO THE LINE ON THE CONTAINER AND MIX IT WELL. DRINK THE ENTIRE CONTAINER AND THEN DRINK TWO (2) MORE CONTAINERS OF WATER OVER THE NEXT 1 HOUR.    This is sometimes easier to drink if this solution is cold, so you can mix the solution a few hours ahead of time and place in the refrigerator prior to drinking. You have to drink the solution within 24 hours of mixing it. Do NOT put this solution over ice. It IS ok to drink with a straw.    3. The endoscopy department will call you 2 days before your colonoscopy to tell you the exact time to arrive, AND to tell you the exact time to drink the 2nd portion of your prep (which will be FIVE HOURS BEFORE YOUR ARRIVAL TIME). At this time given to you, POUR ONE (1) BOTTLE OF SUPREP INTO THE MIXING CONTAINER, PROVIDED INSIDE THE BOX. ADD WATER TO THE LINE ON THE CONTAINER AND MIX IT WELL. DRINK THE ENTIRE CONTAINER AND THEN DRINK TWO (2) MORE CONTAINERS OF WATER OVER THE NEXT 1  HOUR.    This is sometimes easier to drink if this solution is cold, so you can mix the solution a few hours ahead of time and place in the refrigerator prior to drinking. You have to drink the solution within 24 hours of mixing it. Do NOT put this solution over ice. It IS ok to drink with a straw. Once this is complete, you may not have ANYTHING else by mouth!    4. You must have someone with you to DRIVE YOU HOME since you will be receiving IV sedation for the colonoscopy.    5. It is ok to take your heart, blood pressure, and seizure medications in the morning of your test with a SIP of water. Hold other medications until after your procedure. Do NOT have anything else to eat or drink the morning of your colonoscopy. It is ok to brush your teeth.

## 2020-02-16 LAB
FINAL PATHOLOGIC DIAGNOSIS: NORMAL
Lab: NORMAL

## 2020-03-14 ENCOUNTER — ANESTHESIA EVENT (OUTPATIENT)
Dept: ENDOSCOPY | Facility: HOSPITAL | Age: 50
End: 2020-03-14
Payer: COMMERCIAL

## 2020-03-16 ENCOUNTER — ANESTHESIA (OUTPATIENT)
Dept: ENDOSCOPY | Facility: HOSPITAL | Age: 50
End: 2020-03-16
Payer: COMMERCIAL

## 2020-03-16 ENCOUNTER — HOSPITAL ENCOUNTER (OUTPATIENT)
Facility: HOSPITAL | Age: 50
Discharge: HOME | End: 2020-03-16
Attending: INTERNAL MEDICINE | Admitting: INTERNAL MEDICINE
Payer: COMMERCIAL

## 2020-03-16 VITALS
RESPIRATION RATE: 18 BRPM | OXYGEN SATURATION: 100 % | DIASTOLIC BLOOD PRESSURE: 58 MMHG | HEART RATE: 60 BPM | TEMPERATURE: 96 F | SYSTOLIC BLOOD PRESSURE: 95 MMHG

## 2020-03-16 DIAGNOSIS — Z12.11 COLON CANCER SCREENING: Primary | ICD-10-CM

## 2020-03-16 PROCEDURE — G0121 COLON CA SCRN NOT HI RSK IND: HCPCS | Performed by: INTERNAL MEDICINE

## 2020-03-16 PROCEDURE — G0121 COLON CA SCRN NOT HI RSK IND: ICD-10-PCS | Mod: ,,, | Performed by: INTERNAL MEDICINE

## 2020-03-16 PROCEDURE — 00812 ANES LWR INTST SCR COLSC: CPT | Mod: QZ,P2 | Performed by: NURSE ANESTHETIST, CERTIFIED REGISTERED

## 2020-03-16 PROCEDURE — 37000009 HC ANESTHESIA EA ADD 15 MINS: Performed by: INTERNAL MEDICINE

## 2020-03-16 PROCEDURE — 37000008 HC ANESTHESIA 1ST 15 MINUTES: Performed by: INTERNAL MEDICINE

## 2020-03-16 PROCEDURE — 25000003 PHARM REV CODE 250: Performed by: NURSE ANESTHETIST, CERTIFIED REGISTERED

## 2020-03-16 PROCEDURE — 63600175 PHARM REV CODE 636 W HCPCS: Performed by: NURSE ANESTHETIST, CERTIFIED REGISTERED

## 2020-03-16 PROCEDURE — G0121 COLON CA SCRN NOT HI RSK IND: HCPCS | Mod: ,,, | Performed by: INTERNAL MEDICINE

## 2020-03-16 PROCEDURE — 63600175 PHARM REV CODE 636 W HCPCS: Performed by: INTERNAL MEDICINE

## 2020-03-16 RX ORDER — PROPOFOL 10 MG/ML
VIAL (ML) INTRAVENOUS
Status: DISCONTINUED | OUTPATIENT
Start: 2020-03-16 | End: 2020-03-16

## 2020-03-16 RX ORDER — SODIUM CHLORIDE 9 MG/ML
INJECTION, SOLUTION INTRAVENOUS CONTINUOUS
Status: DISCONTINUED | OUTPATIENT
Start: 2020-03-16 | End: 2020-03-16 | Stop reason: HOSPADM

## 2020-03-16 RX ORDER — LIDOCAINE HYDROCHLORIDE 20 MG/ML
INJECTION, SOLUTION EPIDURAL; INFILTRATION; INTRACAUDAL; PERINEURAL
Status: DISCONTINUED | OUTPATIENT
Start: 2020-03-16 | End: 2020-03-16

## 2020-03-16 RX ORDER — SODIUM CHLORIDE 0.9 % (FLUSH) 0.9 %
10 SYRINGE (ML) INJECTION
Status: DISCONTINUED | OUTPATIENT
Start: 2020-03-16 | End: 2020-03-16 | Stop reason: HOSPADM

## 2020-03-16 RX ADMIN — PROPOFOL 30 MG: 10 INJECTION, EMULSION INTRAVENOUS at 10:03

## 2020-03-16 RX ADMIN — PROPOFOL 40 MG: 10 INJECTION, EMULSION INTRAVENOUS at 10:03

## 2020-03-16 RX ADMIN — LIDOCAINE HYDROCHLORIDE 60 MG: 20 INJECTION, SOLUTION EPIDURAL; INFILTRATION; INTRACAUDAL; PERINEURAL at 10:03

## 2020-03-16 RX ADMIN — SODIUM CHLORIDE: 0.9 INJECTION, SOLUTION INTRAVENOUS at 10:03

## 2020-03-16 RX ADMIN — PROPOFOL 120 MG: 10 INJECTION, EMULSION INTRAVENOUS at 10:03

## 2020-03-16 RX ADMIN — PROPOFOL 60 MG: 10 INJECTION, EMULSION INTRAVENOUS at 10:03

## 2020-03-16 RX ADMIN — PROPOFOL 50 MG: 10 INJECTION, EMULSION INTRAVENOUS at 10:03

## 2020-03-16 NOTE — ANESTHESIA POSTPROCEDURE EVALUATION
Anesthesia Post Evaluation    Patient: Analia Apodaca    Procedure(s) Performed: Procedure(s) (LRB):  COLONOSCOPY (N/A)    Final Anesthesia Type: general    Patient location during evaluation: PACU  Patient participation: Yes- Able to Participate  Level of consciousness: awake and alert and oriented  Post-procedure vital signs: reviewed and stable  Pain management: adequate  Airway patency: patent    PONV status at discharge: No PONV  Anesthetic complications: no      Cardiovascular status: blood pressure returned to baseline and hemodynamically stable  Respiratory status: unassisted, spontaneous ventilation and room air  Hydration status: euvolemic  Follow-up not needed.          Vitals Value Taken Time   BP 95/58 3/16/2020 11:10 AM   Temp 35.6 °C (96 °F) 3/16/2020 10:50 AM   Pulse 60 3/16/2020 11:10 AM   Resp 18 3/16/2020 11:10 AM   SpO2 100 % 3/16/2020 11:10 AM         Event Time     Out of Recovery 11:18:58          Pain/Maria C Score: Maria C Score: 10 (3/16/2020 10:00 AM)

## 2020-03-16 NOTE — OP NOTE
[facility]  [place]    Date of procedure:  03/16/2020    Procedure:  Colonoscopy    Provider:  Brittany Tolbert MD    Indication:  Average risk screening    ASA:  2    Complications:  none    Estimated blood loss:  none    Medications:  General anesthesia per CRNA    Procedure in Detail:  Prior to the procedure, a History and Physical was performed, and patient medications and allergies were reviewed. The patient is competent. The risks and benefits of the procedure and the sedation options and risks were discussed with the patient. All questions were answered and informed consent was obtained. Patient identification and proposed procedure were verified by the physician, the nurse, and the anesthetist in the pre-procedure area and in the procedure room. Mental Status Examination: alert and oriented.   Airway Examination: normal oropharyngeal airway and neck mobility. Respiratory Examination: clear to auscultation. CV Examination: normal. Prophylactic Antibiotics: The patient does not require prophylactic antibiotics. Prior Anticoagulants: The patient has taken no previous anticoagulant or antiplatelet agents.  After reviewing the risks and benefits, the patient was deemed in satisfactory condition to undergo the procedure. The anesthesia plan was to use monitored anesthesia care (MAC) with General Anesthesia. Immediately prior to administration of medications, the patient was re-assessed for adequacy to receive sedatives. The heart rate, respiratory rate, oxygen saturations, blood pressure, adequacy of pulmonary ventilation, and response to care were monitored throughout the procedure. The physical status of the patient was re-assessed after the procedure.  After obtaining informed consent, the endoscope was passed under direct vision. Throughout the procedure, the patient's blood pressure, pulse, and oxygen saturations were monitored continuously. The Olympus scope CF-Q190 was introduced through  the anus and passed under direct visualization to the cecum.  Air was insufflated for visualization, but most of the air was removed prior to completion of the exam.  The colonoscopy was accomplished without difficulty. The patient tolerated the procedure well.    Findings:  The prep was good.  Moderate left sided diverticulosis with spasm was noted.  No polyps were seen.  Small Grade I internal hemorrhoids were noted.    Impression:    1.  Diverticulosis  2.  IH    Specimens collected:  none    Recommendations:   1. Discharge pt to home  2. Patient has a contact number available for emergencies. The signs and symptoms of potential delayed complications were discussed with the patient. Return to normal activities tomorrow. Written discharge instructions were provided to the patient.  3. Continue current medications  4. High fiber diet  5. Repeat 10 years

## 2020-03-16 NOTE — H&P
Cc:  Screening colonoscopy    48 yo F here for screening colonoscopy.  She denies FH of colon cancer, change in bowel habits, rectal bleeding, or weight loss.    ROS:  No headache, no fever/chills, no chest pain/SOB, no nausea/vomiting/diarrhea/constipation/GI bleeding/abdominal pain, no dysuria/hematuria.      Alert and oriented, NAD, well appearing  RRR, no murmurs  CTA bilaterally, no crackles  Soft, NT/ND, normal BS  Normal distal pulses, no edema    Assessment:  Screening colonoscopy    Plan:  Proceed with endoscopy, further recommendations after endoscopy complete    General anesthesia, agree with plan per anesthesia

## 2020-03-16 NOTE — ANESTHESIA PREPROCEDURE EVALUATION
03/16/2020  Analia Apodaca is a 49 y.o., female.    Anesthesia Evaluation    I have reviewed the Patient Summary Reports.    I have reviewed the Nursing Notes.   I have reviewed the Medications.     Review of Systems  Anesthesia Hx:  No problems with previous Anesthesia  History of prior surgery of interest to airway management or planning:  Denies Personal Hx of Anesthesia complications.   Social:  Non-Smoker, No Alcohol Use    Hematology/Oncology:  Hematology Normal   Oncology Normal     EENT/Dental:EENT/Dental Normal   Cardiovascular:   Exercise tolerance: good Hypertension, well controlled    Pulmonary:  Pulmonary Normal    Renal/:  Renal/ Normal     Hepatic/GI:   GERD, well controlled    Musculoskeletal:  Musculoskeletal Normal    Neurological:  Neurology Normal    Endocrine:  Endocrine Normal    Dermatological:  Skin Normal    Psych:   Psychiatric History depression          Physical Exam  General:  Obesity    Airway/Jaw/Neck:  Airway Findings: Mouth Opening: Normal Tongue: Normal  General Airway Assessment: Adult  Mallampati: II  TM Distance: Normal, at least 6 cm  Jaw/Neck Findings:  Neck ROM: Normal ROM      Dental:  Dental Findings: In tact        Mental Status:  Mental Status Findings:  Cooperative, Alert and Oriented         Anesthesia Plan  Type of Anesthesia, risks & benefits discussed:  Anesthesia Type:  general  Patient's Preference:   Intra-op Monitoring Plan: standard ASA monitors  Intra-op Monitoring Plan Comments:   Post Op Pain Control Plan:   Post Op Pain Control Plan Comments:   Induction:    Beta Blocker:  Patient is not currently on a Beta-Blocker (No further documentation required).       Informed Consent: Patient understands risks and agrees with Anesthesia plan.  Questions answered. Anesthesia consent signed with patient.  ASA Score: 2     Day of Surgery Review of History &  Physical: I have interviewed and examined the patient. I have reviewed the patient's H&P dated: 3/16/20. There are no significant changes.  H&P update referred to the surgeon.         Ready For Surgery From Anesthesia Perspective.

## 2020-03-16 NOTE — TRANSFER OF CARE
Anesthesia Transfer of Care Note    Patient: Analia Apodaca    Procedure(s) Performed: Procedure(s) (LRB):  COLONOSCOPY (N/A)    Patient location: PACU    Anesthesia Type: general    Transport from OR: Transported from OR on 6-10 L/min O2 by face mask with adequate spontaneous ventilation    Post pain: adequate analgesia    Post assessment: no apparent anesthetic complications and tolerated procedure well    Post vital signs: stable    Level of consciousness: sedated    Nausea/Vomiting: no nausea/vomiting    Complications: none    Transfer of care protocol was followed      Last vitals:   Visit Vitals  /72 (BP Location: Right arm, Patient Position: Lying)   Pulse (!) 58   Temp 36 °C (96.8 °F) (Oral)   Resp 18   LMP 07/01/2016   SpO2 99%   Breastfeeding? No

## 2020-05-17 DIAGNOSIS — I10 BENIGN ESSENTIAL HTN: ICD-10-CM

## 2020-05-18 RX ORDER — IRBESARTAN 150 MG/1
TABLET ORAL
Qty: 90 TABLET | Refills: 1 | Status: SHIPPED | OUTPATIENT
Start: 2020-05-18 | End: 2020-11-16

## 2020-05-19 LAB
CHOLEST/HDLC SERPL: 3 {RATIO}
CHOLESTEROL, TOTAL: 180
HDLC SERPL-MCNC: 61 MG/DL (ref 35–70)
LDL CHOLESTEROL DIRECT: 89 MG/DL
NON HDL CHOL. (LDL+VLDL): 119
TRIGL SERPL-MCNC: 85 MG/DL
VLDL CHOLESTEROL: 17 MG/DL

## 2020-07-24 ENCOUNTER — OFFICE VISIT (OUTPATIENT)
Dept: INTERNAL MEDICINE | Facility: CLINIC | Age: 50
End: 2020-07-24
Payer: COMMERCIAL

## 2020-07-24 DIAGNOSIS — D64.9 ANEMIA, UNSPECIFIED TYPE: ICD-10-CM

## 2020-07-24 DIAGNOSIS — I73.00 RAYNAUD'S DISEASE WITHOUT GANGRENE: Primary | ICD-10-CM

## 2020-07-24 DIAGNOSIS — M25.531 PAIN OF BOTH WRIST JOINTS: ICD-10-CM

## 2020-07-24 DIAGNOSIS — M25.532 PAIN OF BOTH WRIST JOINTS: ICD-10-CM

## 2020-07-24 PROCEDURE — 99212 PR OFFICE/OUTPT VISIT, EST, LEVL II, 10-19 MIN: ICD-10-PCS | Mod: 95,,, | Performed by: NURSE PRACTITIONER

## 2020-07-24 PROCEDURE — 99212 OFFICE O/P EST SF 10 MIN: CPT | Mod: 95,,, | Performed by: NURSE PRACTITIONER

## 2020-07-24 NOTE — PROGRESS NOTES
"The patient location is: at work   The chief complaint leading to consultation is: joint pain throughout.     Visit type: audiovisual    Face to Face time with patient:  minutes of total time spent on the encounter, which includes face to face time and non-face to face time preparing to see the patient (eg, review of tests), Obtaining and/or reviewing separately obtained history, Documenting clinical information in the electronic or other health record, Independently interpreting results (not separately reported) and communicating results to the patient/family/caregiver, or Care coordination (not separately reported).         Each patient to whom he or she provides medical services by telemedicine is:  (1) informed of the relationship between the physician and patient and the respective role of any other health care provider with respect to management of the patient; and (2) notified that he or she may decline to receive medical services by telemedicine and may withdraw from such care at any time.    Notes: patient presents with joint pain bilaterally to knees and wrist. States that she does not have warmth or redness to painful areas. No morning stiffness. She does have Raynauds disease and was seen in 2014 by rheumatology. Will run blood work since has been long time since had inflammatory markers done. For now take advil for pain as needed.   "This note will not be shared with the patient."    1. Raynaud's disease without gangrene  Sedimentation rate    JUAN MIGUEL Screen w/Reflex    Rheumatoid factor    Comprehensive metabolic panel    CBC auto differential   2. Pain of both wrist joints  Sedimentation rate    JUAN MIGUEL Screen w/Reflex    Rheumatoid factor    Comprehensive metabolic panel    CBC auto differential    Vitamin B12    Vitamin D    TSH   3. Anemia, unspecified type  Iron and TIBC          "

## 2020-07-25 ENCOUNTER — LAB VISIT (OUTPATIENT)
Dept: LAB | Facility: HOSPITAL | Age: 50
End: 2020-07-25
Attending: NURSE PRACTITIONER
Payer: COMMERCIAL

## 2020-07-25 DIAGNOSIS — M25.532 PAIN OF BOTH WRIST JOINTS: ICD-10-CM

## 2020-07-25 DIAGNOSIS — D64.9 ANEMIA, UNSPECIFIED TYPE: ICD-10-CM

## 2020-07-25 DIAGNOSIS — M25.531 PAIN OF BOTH WRIST JOINTS: ICD-10-CM

## 2020-07-25 DIAGNOSIS — I73.00 RAYNAUD'S DISEASE WITHOUT GANGRENE: ICD-10-CM

## 2020-07-25 LAB
25(OH)D3+25(OH)D2 SERPL-MCNC: 42 NG/ML (ref 30–96)
ALBUMIN SERPL BCP-MCNC: 3.7 G/DL (ref 3.5–5.2)
ALP SERPL-CCNC: 71 U/L (ref 55–135)
ALT SERPL W/O P-5'-P-CCNC: 14 U/L (ref 10–44)
ANION GAP SERPL CALC-SCNC: 9 MMOL/L (ref 8–16)
AST SERPL-CCNC: 13 U/L (ref 10–40)
BASOPHILS # BLD AUTO: 0.06 K/UL (ref 0–0.2)
BASOPHILS NFR BLD: 1.3 % (ref 0–1.9)
BILIRUB SERPL-MCNC: 0.4 MG/DL (ref 0.1–1)
BUN SERPL-MCNC: 11 MG/DL (ref 6–20)
CALCIUM SERPL-MCNC: 9.1 MG/DL (ref 8.7–10.5)
CHLORIDE SERPL-SCNC: 108 MMOL/L (ref 95–110)
CO2 SERPL-SCNC: 25 MMOL/L (ref 23–29)
CREAT SERPL-MCNC: 1 MG/DL (ref 0.5–1.4)
DIFFERENTIAL METHOD: ABNORMAL
EOSINOPHIL # BLD AUTO: 0.2 K/UL (ref 0–0.5)
EOSINOPHIL NFR BLD: 4.1 % (ref 0–8)
ERYTHROCYTE [DISTWIDTH] IN BLOOD BY AUTOMATED COUNT: 13 % (ref 11.5–14.5)
ERYTHROCYTE [SEDIMENTATION RATE] IN BLOOD BY WESTERGREN METHOD: 10 MM/HR (ref 0–20)
EST. GFR  (AFRICAN AMERICAN): >60 ML/MIN/1.73 M^2
EST. GFR  (NON AFRICAN AMERICAN): >60 ML/MIN/1.73 M^2
GLUCOSE SERPL-MCNC: 102 MG/DL (ref 70–110)
HCT VFR BLD AUTO: 37.7 % (ref 37–48.5)
HGB BLD-MCNC: 12.2 G/DL (ref 12–16)
IMM GRANULOCYTES # BLD AUTO: 0.01 K/UL (ref 0–0.04)
IMM GRANULOCYTES NFR BLD AUTO: 0.2 % (ref 0–0.5)
IRON SERPL-MCNC: 29 UG/DL (ref 30–160)
LYMPHOCYTES # BLD AUTO: 2 K/UL (ref 1–4.8)
LYMPHOCYTES NFR BLD: 43.3 % (ref 18–48)
MCH RBC QN AUTO: 27.2 PG (ref 27–31)
MCHC RBC AUTO-ENTMCNC: 32.4 G/DL (ref 32–36)
MCV RBC AUTO: 84 FL (ref 82–98)
MONOCYTES # BLD AUTO: 0.7 K/UL (ref 0.3–1)
MONOCYTES NFR BLD: 14.8 % (ref 4–15)
NEUTROPHILS # BLD AUTO: 1.7 K/UL (ref 1.8–7.7)
NEUTROPHILS NFR BLD: 36.3 % (ref 38–73)
NRBC BLD-RTO: 0 /100 WBC
PLATELET # BLD AUTO: 235 K/UL (ref 150–350)
PMV BLD AUTO: 9.5 FL (ref 9.2–12.9)
POTASSIUM SERPL-SCNC: 4.1 MMOL/L (ref 3.5–5.1)
PROT SERPL-MCNC: 6.9 G/DL (ref 6–8.4)
RBC # BLD AUTO: 4.48 M/UL (ref 4–5.4)
RHEUMATOID FACT SERPL-ACNC: <10 IU/ML (ref 0–15)
SATURATED IRON: 8 % (ref 20–50)
SODIUM SERPL-SCNC: 142 MMOL/L (ref 136–145)
TOTAL IRON BINDING CAPACITY: 370 UG/DL (ref 250–450)
TRANSFERRIN SERPL-MCNC: 250 MG/DL (ref 200–375)
TSH SERPL DL<=0.005 MIU/L-ACNC: 2.95 UIU/ML (ref 0.4–4)
VIT B12 SERPL-MCNC: 378 PG/ML (ref 210–950)
WBC # BLD AUTO: 4.6 K/UL (ref 3.9–12.7)

## 2020-07-25 PROCEDURE — 82306 VITAMIN D 25 HYDROXY: CPT

## 2020-07-25 PROCEDURE — 80053 COMPREHEN METABOLIC PANEL: CPT

## 2020-07-25 PROCEDURE — 86039 ANTINUCLEAR ANTIBODIES (ANA): CPT

## 2020-07-25 PROCEDURE — 86431 RHEUMATOID FACTOR QUANT: CPT

## 2020-07-25 PROCEDURE — 82607 VITAMIN B-12: CPT

## 2020-07-25 PROCEDURE — 86038 ANTINUCLEAR ANTIBODIES: CPT

## 2020-07-25 PROCEDURE — 83540 ASSAY OF IRON: CPT

## 2020-07-25 PROCEDURE — 84443 ASSAY THYROID STIM HORMONE: CPT

## 2020-07-25 PROCEDURE — 85025 COMPLETE CBC W/AUTO DIFF WBC: CPT

## 2020-07-25 PROCEDURE — 85651 RBC SED RATE NONAUTOMATED: CPT

## 2020-07-25 PROCEDURE — 36415 COLL VENOUS BLD VENIPUNCTURE: CPT

## 2020-07-25 PROCEDURE — 86235 NUCLEAR ANTIGEN ANTIBODY: CPT | Mod: 59

## 2020-07-28 ENCOUNTER — TELEPHONE (OUTPATIENT)
Dept: INTERNAL MEDICINE | Facility: CLINIC | Age: 50
End: 2020-07-28

## 2020-07-28 NOTE — TELEPHONE ENCOUNTER
Her iron levels are low and that can cause widespread pain she is having. The only other one I am waiting to get back is the regi. Is she taking iron supplements ??

## 2020-07-28 NOTE — TELEPHONE ENCOUNTER
Pt notified stating she takes a iron supplement daily - [place] 65 mg iron  250 mg magnesium  6000 units of D3  please advise  Thanks!

## 2020-07-28 NOTE — TELEPHONE ENCOUNTER
----- Message from Angelita Castaneda MA sent at 7/28/2020  9:56 AM CDT -----  Patient had bloodwork done at the hospital this past weekend.     Please ask Shital to review and please call with results [phone]

## 2020-07-29 LAB
ANA PATTERN 1: NORMAL
ANA SER QL IF: POSITIVE
ANA TITR SER IF: NORMAL {TITER}

## 2020-07-30 NOTE — TELEPHONE ENCOUNTER
I want her to take it at least bid for now so that her iron levels come up and I will recheck in one month. If still with joint pain will need to see rheumatology again

## 2020-07-31 LAB
ANTI SM ANTIBODY: 0.07 RATIO (ref 0–0.99)
ANTI SM/RNP ANTIBODY: 0.12 RATIO (ref 0–0.99)
ANTI-SM INTERPRETATION: NEGATIVE
ANTI-SM/RNP INTERPRETATION: NEGATIVE
ANTI-SSA ANTIBODY: 0.1 RATIO (ref 0–0.99)
ANTI-SSA INTERPRETATION: NEGATIVE
ANTI-SSB ANTIBODY: 0.07 RATIO (ref 0–0.99)
ANTI-SSB INTERPRETATION: NEGATIVE
DSDNA AB SER-ACNC: NORMAL [IU]/ML

## 2020-08-05 ENCOUNTER — PATIENT OUTREACH (OUTPATIENT)
Dept: ADMINISTRATIVE | Facility: HOSPITAL | Age: 50
End: 2020-08-05

## 2020-08-05 DIAGNOSIS — Z11.59 NEED FOR HEPATITIS C SCREENING TEST: Primary | ICD-10-CM

## 2020-08-05 NOTE — PROGRESS NOTES
Non-compliant report chart audits. Chart review completed for HM test overdue (mammograms, Colonoscopies, pap smears, DM labs, and/or EYE EXAMs)  06/18/2020    Care Everywhere and media, updates requested and reviewed.       Requested lipid panel  records fom Dr. Gavin Nagy

## 2020-08-05 NOTE — LETTER
AUTHORIZATION FOR RELEASE OF   CONFIDENTIAL INFORMATION    Dear Dr. Gavin Nagy,    We are seeing Analia Apodaca, date of birth 1970, in the clinic at [facility]. Shital Loera NP is the patient's PCP. Analia Apodaca has an outstanding lab/procedure at the time we reviewed her chart. In order to help keep her health information updated, she has authorized us to request the following medical record(s):                                                 ( x )  OUTSIDE LAB RESULTS           Please fax records to [facility], Angelique G Torres, NP,   Cony King LPN Clinical Care Coordinator  [facility]  Phone: [phone]  Fax: [phone]                  Patient Name: Analia Apodaca  : 1970  Patient Phone #: [phone]

## 2020-08-06 ENCOUNTER — PATIENT OUTREACH (OUTPATIENT)
Dept: ADMINISTRATIVE | Facility: HOSPITAL | Age: 50
End: 2020-08-06

## 2020-09-01 ENCOUNTER — TELEPHONE (OUTPATIENT)
Dept: INTERNAL MEDICINE | Facility: CLINIC | Age: 50
End: 2020-09-01

## 2020-09-01 DIAGNOSIS — D50.9 IRON DEFICIENCY ANEMIA, UNSPECIFIED IRON DEFICIENCY ANEMIA TYPE: Primary | ICD-10-CM

## 2020-09-03 ENCOUNTER — LAB VISIT (OUTPATIENT)
Dept: LAB | Facility: HOSPITAL | Age: 50
End: 2020-09-03
Attending: NURSE PRACTITIONER
Payer: COMMERCIAL

## 2020-09-03 DIAGNOSIS — D50.9 IRON DEFICIENCY ANEMIA, UNSPECIFIED IRON DEFICIENCY ANEMIA TYPE: ICD-10-CM

## 2020-09-03 LAB
IRON SERPL-MCNC: 38 UG/DL (ref 30–160)
SATURATED IRON: 12 % (ref 20–50)
TOTAL IRON BINDING CAPACITY: 330 UG/DL (ref 250–450)
TRANSFERRIN SERPL-MCNC: 223 MG/DL (ref 200–375)

## 2020-09-03 PROCEDURE — 83540 ASSAY OF IRON: CPT

## 2020-09-03 PROCEDURE — 36415 COLL VENOUS BLD VENIPUNCTURE: CPT

## 2020-10-15 ENCOUNTER — TELEPHONE (OUTPATIENT)
Dept: FAMILY MEDICINE | Facility: CLINIC | Age: 50
End: 2020-10-15

## 2020-10-21 ENCOUNTER — CLINICAL SUPPORT (OUTPATIENT)
Dept: INTERNAL MEDICINE | Facility: CLINIC | Age: 50
End: 2020-10-21
Payer: COMMERCIAL

## 2020-10-21 VITALS — SYSTOLIC BLOOD PRESSURE: 124 MMHG | DIASTOLIC BLOOD PRESSURE: 82 MMHG

## 2020-11-15 DIAGNOSIS — I10 BENIGN ESSENTIAL HTN: ICD-10-CM

## 2020-11-16 RX ORDER — IRBESARTAN 150 MG/1
TABLET ORAL
Qty: 90 TABLET | Refills: 1 | Status: SHIPPED | OUTPATIENT
Start: 2020-11-16 | End: 2021-05-17 | Stop reason: SDUPTHER

## 2020-11-30 ENCOUNTER — IMMUNIZATION (OUTPATIENT)
Dept: INTERNAL MEDICINE | Facility: CLINIC | Age: 50
End: 2020-11-30
Payer: COMMERCIAL

## 2020-11-30 ENCOUNTER — TELEPHONE (OUTPATIENT)
Dept: INTERNAL MEDICINE | Facility: CLINIC | Age: 50
End: 2020-11-30

## 2020-11-30 PROCEDURE — 90471 FLU VACCINE (QUAD) GREATER THAN OR EQUAL TO 3YO PRESERVATIVE FREE IM: ICD-10-PCS | Mod: S$GLB,,, | Performed by: INTERNAL MEDICINE

## 2020-11-30 PROCEDURE — 90471 IMMUNIZATION ADMIN: CPT | Mod: S$GLB,,, | Performed by: INTERNAL MEDICINE

## 2020-11-30 PROCEDURE — 90686 FLU VACCINE (QUAD) GREATER THAN OR EQUAL TO 3YO PRESERVATIVE FREE IM: ICD-10-PCS | Mod: S$GLB,,, | Performed by: INTERNAL MEDICINE

## 2020-11-30 PROCEDURE — 90686 IIV4 VACC NO PRSV 0.5 ML IM: CPT | Mod: S$GLB,,, | Performed by: INTERNAL MEDICINE

## 2020-11-30 NOTE — TELEPHONE ENCOUNTER
----- Message from Angelita Castaneda MA sent at 11/30/2020  2:03 PM CST -----  Patient would like to re-start Contrave.  Would Shital prescribe for her.     Send to [employer] ([place])

## 2020-12-16 ENCOUNTER — CLINICAL SUPPORT (OUTPATIENT)
Dept: INTERNAL MEDICINE | Facility: CLINIC | Age: 50
End: 2020-12-16
Payer: COMMERCIAL

## 2020-12-16 DIAGNOSIS — D50.9 IRON DEFICIENCY ANEMIA, UNSPECIFIED IRON DEFICIENCY ANEMIA TYPE: Primary | ICD-10-CM

## 2020-12-16 LAB
IRON SERPL-MCNC: 42 UG/DL (ref 30–160)
SATURATED IRON: 11 % (ref 20–50)
TOTAL IRON BINDING CAPACITY: 379 UG/DL (ref 250–450)
TRANSFERRIN SERPL-MCNC: 256 MG/DL (ref 200–375)

## 2020-12-16 PROCEDURE — 36415 COLL VENOUS BLD VENIPUNCTURE: CPT

## 2020-12-16 PROCEDURE — 83540 ASSAY OF IRON: CPT

## 2020-12-28 ENCOUNTER — TELEPHONE (OUTPATIENT)
Dept: OBSTETRICS AND GYNECOLOGY | Facility: CLINIC | Age: 50
End: 2020-12-28

## 2020-12-28 DIAGNOSIS — Z12.39 ENCOUNTER FOR SCREENING FOR MALIGNANT NEOPLASM OF BREAST, UNSPECIFIED SCREENING MODALITY: Primary | ICD-10-CM

## 2020-12-28 NOTE — TELEPHONE ENCOUNTER
----- Message from Shaina Watts sent at 12/28/2020 12:24 PM CST -----  Regarding: self  Analia Apodaca  MRN: [id number]  Home Phone      [phone]  Work Phone      Not on file.  Mobile          [phone]    Patient Care Team:  Shtial Loera NP as PCP - General (Family Medicine)  Yoshi Boucher MD as Obstetrician (Obstetrics)  Nubia Blanton LPN as Care Coordinator  Gavin Nagy MD as Consulting Physician (Cardiology)  OB? No  What phone number can you be reached at?   Message:   Please link mammogram orders. Thanks.

## 2021-02-05 ENCOUNTER — TELEPHONE (OUTPATIENT)
Dept: OBSTETRICS AND GYNECOLOGY | Facility: CLINIC | Age: 51
End: 2021-02-05

## 2021-02-05 DIAGNOSIS — Z12.31 BREAST CANCER SCREENING BY MAMMOGRAM: Primary | ICD-10-CM

## 2021-02-08 ENCOUNTER — PATIENT OUTREACH (OUTPATIENT)
Dept: ADMINISTRATIVE | Facility: OTHER | Age: 51
End: 2021-02-08

## 2021-02-11 ENCOUNTER — HOSPITAL ENCOUNTER (OUTPATIENT)
Dept: RADIOLOGY | Facility: HOSPITAL | Age: 51
Discharge: HOME | End: 2021-02-11
Attending: OBSTETRICS & GYNECOLOGY
Payer: COMMERCIAL

## 2021-02-11 ENCOUNTER — OFFICE VISIT (OUTPATIENT)
Dept: OBSTETRICS AND GYNECOLOGY | Facility: CLINIC | Age: 51
End: 2021-02-11
Payer: COMMERCIAL

## 2021-02-11 ENCOUNTER — TELEPHONE (OUTPATIENT)
Dept: OBSTETRICS AND GYNECOLOGY | Facility: CLINIC | Age: 51
End: 2021-02-11

## 2021-02-11 VITALS — BODY MASS INDEX: 34.55 KG/M2 | WEIGHT: 215 LBS | HEIGHT: 66 IN

## 2021-02-11 VITALS
DIASTOLIC BLOOD PRESSURE: 66 MMHG | BODY MASS INDEX: 37.12 KG/M2 | HEIGHT: 66 IN | HEART RATE: 72 BPM | WEIGHT: 231 LBS | SYSTOLIC BLOOD PRESSURE: 118 MMHG

## 2021-02-11 DIAGNOSIS — Z01.419 ENCOUNTER FOR GYNECOLOGICAL EXAMINATION WITHOUT ABNORMAL FINDING: ICD-10-CM

## 2021-02-11 DIAGNOSIS — Z12.39 ENCOUNTER FOR SCREENING FOR MALIGNANT NEOPLASM OF BREAST, UNSPECIFIED SCREENING MODALITY: ICD-10-CM

## 2021-02-11 DIAGNOSIS — R92.8 ABNORMAL MAMMOGRAM: ICD-10-CM

## 2021-02-11 DIAGNOSIS — Z12.4 CERVICAL CANCER SCREENING: Primary | ICD-10-CM

## 2021-02-11 PROCEDURE — 3008F PR BODY MASS INDEX (BMI) DOCUMENTED: ICD-10-PCS | Mod: CPTII,S$GLB,, | Performed by: OBSTETRICS & GYNECOLOGY

## 2021-02-11 PROCEDURE — 1126F AMNT PAIN NOTED NONE PRSNT: CPT | Mod: S$GLB,,, | Performed by: OBSTETRICS & GYNECOLOGY

## 2021-02-11 PROCEDURE — 99396 PR PREVENTIVE VISIT,EST,40-64: ICD-10-PCS | Mod: S$GLB,,, | Performed by: OBSTETRICS & GYNECOLOGY

## 2021-02-11 PROCEDURE — 3078F PR MOST RECENT DIASTOLIC BLOOD PRESSURE < 80 MM HG: ICD-10-PCS | Mod: CPTII,S$GLB,, | Performed by: OBSTETRICS & GYNECOLOGY

## 2021-02-11 PROCEDURE — 77067 MAMMO DIGITAL SCREENING BILAT WITH TOMO: ICD-10-PCS | Mod: 26,,, | Performed by: RADIOLOGY

## 2021-02-11 PROCEDURE — 77063 BREAST TOMOSYNTHESIS BI: CPT | Mod: 26,,, | Performed by: RADIOLOGY

## 2021-02-11 PROCEDURE — 3008F BODY MASS INDEX DOCD: CPT | Mod: CPTII,S$GLB,, | Performed by: OBSTETRICS & GYNECOLOGY

## 2021-02-11 PROCEDURE — 99396 PREV VISIT EST AGE 40-64: CPT | Mod: S$GLB,,, | Performed by: OBSTETRICS & GYNECOLOGY

## 2021-02-11 PROCEDURE — 88175 CYTOPATH C/V AUTO FLUID REDO: CPT | Performed by: OBSTETRICS & GYNECOLOGY

## 2021-02-11 PROCEDURE — 77063 MAMMO DIGITAL SCREENING BILAT WITH TOMO: ICD-10-PCS | Mod: 26,,, | Performed by: RADIOLOGY

## 2021-02-11 PROCEDURE — 77067 SCR MAMMO BI INCL CAD: CPT | Mod: 26,,, | Performed by: RADIOLOGY

## 2021-02-11 PROCEDURE — 99999 PR PBB SHADOW E&M-EST. PATIENT-LVL III: ICD-10-PCS | Mod: PBBFAC,,, | Performed by: OBSTETRICS & GYNECOLOGY

## 2021-02-11 PROCEDURE — 3074F SYST BP LT 130 MM HG: CPT | Mod: CPTII,S$GLB,, | Performed by: OBSTETRICS & GYNECOLOGY

## 2021-02-11 PROCEDURE — 99999 PR PBB SHADOW E&M-EST. PATIENT-LVL III: CPT | Mod: PBBFAC,,, | Performed by: OBSTETRICS & GYNECOLOGY

## 2021-02-11 PROCEDURE — 3074F PR MOST RECENT SYSTOLIC BLOOD PRESSURE < 130 MM HG: ICD-10-PCS | Mod: CPTII,S$GLB,, | Performed by: OBSTETRICS & GYNECOLOGY

## 2021-02-11 PROCEDURE — 1126F PR PAIN SEVERITY QUANTIFIED, NO PAIN PRESENT: ICD-10-PCS | Mod: S$GLB,,, | Performed by: OBSTETRICS & GYNECOLOGY

## 2021-02-11 PROCEDURE — 3078F DIAST BP <80 MM HG: CPT | Mod: CPTII,S$GLB,, | Performed by: OBSTETRICS & GYNECOLOGY

## 2021-02-11 PROCEDURE — 77067 SCR MAMMO BI INCL CAD: CPT | Mod: TC

## 2021-02-11 RX ORDER — FAMOTIDINE 40 MG/1
TABLET, FILM COATED ORAL
COMMUNITY
Start: 2021-02-10 | End: 2024-01-25

## 2021-02-18 ENCOUNTER — HOSPITAL ENCOUNTER (OUTPATIENT)
Dept: RADIOLOGY | Facility: HOSPITAL | Age: 51
Discharge: HOME | End: 2021-02-18
Attending: OBSTETRICS & GYNECOLOGY
Payer: COMMERCIAL

## 2021-02-18 ENCOUNTER — OFFICE VISIT (OUTPATIENT)
Dept: INTERNAL MEDICINE | Facility: CLINIC | Age: 51
End: 2021-02-18
Payer: COMMERCIAL

## 2021-02-18 VITALS
TEMPERATURE: 98 F | HEART RATE: 75 BPM | BODY MASS INDEX: 39.41 KG/M2 | OXYGEN SATURATION: 96 % | RESPIRATION RATE: 19 BRPM | SYSTOLIC BLOOD PRESSURE: 114 MMHG | DIASTOLIC BLOOD PRESSURE: 70 MMHG | WEIGHT: 236.56 LBS | HEIGHT: 65 IN

## 2021-02-18 DIAGNOSIS — R92.8 ABNORMAL MAMMOGRAM: ICD-10-CM

## 2021-02-18 DIAGNOSIS — M77.01 MEDIAL EPICONDYLITIS OF RIGHT ELBOW: Primary | ICD-10-CM

## 2021-02-18 PROCEDURE — 77061 BREAST TOMOSYNTHESIS UNI: CPT | Mod: TC,LT

## 2021-02-18 PROCEDURE — 99213 OFFICE O/P EST LOW 20 MIN: CPT | Mod: S$GLB,,, | Performed by: NURSE PRACTITIONER

## 2021-02-18 PROCEDURE — 3078F PR MOST RECENT DIASTOLIC BLOOD PRESSURE < 80 MM HG: ICD-10-PCS | Mod: CPTII,S$GLB,, | Performed by: NURSE PRACTITIONER

## 2021-02-18 PROCEDURE — 77061 MAMMO DIGITAL DIAGNOSTIC LEFT WITH TOMO: ICD-10-PCS | Mod: 26,LT,, | Performed by: RADIOLOGY

## 2021-02-18 PROCEDURE — 76642 ULTRASOUND BREAST LIMITED: CPT | Mod: TC,LT

## 2021-02-18 PROCEDURE — 3074F PR MOST RECENT SYSTOLIC BLOOD PRESSURE < 130 MM HG: ICD-10-PCS | Mod: CPTII,S$GLB,, | Performed by: NURSE PRACTITIONER

## 2021-02-18 PROCEDURE — 3008F PR BODY MASS INDEX (BMI) DOCUMENTED: ICD-10-PCS | Mod: CPTII,S$GLB,, | Performed by: NURSE PRACTITIONER

## 2021-02-18 PROCEDURE — 76642 ULTRASOUND BREAST LIMITED: CPT | Mod: 26,LT,, | Performed by: RADIOLOGY

## 2021-02-18 PROCEDURE — 3078F DIAST BP <80 MM HG: CPT | Mod: CPTII,S$GLB,, | Performed by: NURSE PRACTITIONER

## 2021-02-18 PROCEDURE — 77061 BREAST TOMOSYNTHESIS UNI: CPT | Mod: 26,LT,, | Performed by: RADIOLOGY

## 2021-02-18 PROCEDURE — 1126F PR PAIN SEVERITY QUANTIFIED, NO PAIN PRESENT: ICD-10-PCS | Mod: S$GLB,,, | Performed by: NURSE PRACTITIONER

## 2021-02-18 PROCEDURE — 3008F BODY MASS INDEX DOCD: CPT | Mod: CPTII,S$GLB,, | Performed by: NURSE PRACTITIONER

## 2021-02-18 PROCEDURE — 77065 DX MAMMO INCL CAD UNI: CPT | Mod: 26,LT,, | Performed by: RADIOLOGY

## 2021-02-18 PROCEDURE — 3074F SYST BP LT 130 MM HG: CPT | Mod: CPTII,S$GLB,, | Performed by: NURSE PRACTITIONER

## 2021-02-18 PROCEDURE — 99999 PR PBB SHADOW E&M-EST. PATIENT-LVL IV: CPT | Mod: PBBFAC,,, | Performed by: NURSE PRACTITIONER

## 2021-02-18 PROCEDURE — 77065 MAMMO DIGITAL DIAGNOSTIC LEFT WITH TOMO: ICD-10-PCS | Mod: 26,LT,, | Performed by: RADIOLOGY

## 2021-02-18 PROCEDURE — 1126F AMNT PAIN NOTED NONE PRSNT: CPT | Mod: S$GLB,,, | Performed by: NURSE PRACTITIONER

## 2021-02-18 PROCEDURE — 99999 PR PBB SHADOW E&M-EST. PATIENT-LVL IV: ICD-10-PCS | Mod: PBBFAC,,, | Performed by: NURSE PRACTITIONER

## 2021-02-18 PROCEDURE — 76642 US BREAST LEFT LIMITED: ICD-10-PCS | Mod: 26,LT,, | Performed by: RADIOLOGY

## 2021-02-18 PROCEDURE — 99213 PR OFFICE/OUTPT VISIT, EST, LEVL III, 20-29 MIN: ICD-10-PCS | Mod: S$GLB,,, | Performed by: NURSE PRACTITIONER

## 2021-02-18 RX ORDER — METHYLPREDNISOLONE 4 MG/1
TABLET ORAL
Qty: 1 PACKAGE | Refills: 0 | Status: SHIPPED | OUTPATIENT
Start: 2021-02-18 | End: 2021-03-11

## 2021-02-18 RX ORDER — OMEPRAZOLE 20 MG/1
CAPSULE, DELAYED RELEASE ORAL
COMMUNITY
Start: 2021-02-14 | End: 2021-10-11 | Stop reason: SDUPTHER

## 2021-02-18 RX ORDER — IBUPROFEN 800 MG/1
800 TABLET ORAL 2 TIMES DAILY PRN
Qty: 60 TABLET | Refills: 2 | Status: SHIPPED | OUTPATIENT
Start: 2021-02-18 | End: 2024-01-25

## 2021-02-24 ENCOUNTER — TELEPHONE (OUTPATIENT)
Dept: OBSTETRICS AND GYNECOLOGY | Facility: CLINIC | Age: 51
End: 2021-02-24

## 2021-03-09 LAB
FINAL PATHOLOGIC DIAGNOSIS: NORMAL
Lab: NORMAL

## 2021-05-04 ENCOUNTER — PATIENT MESSAGE (OUTPATIENT)
Dept: RESEARCH | Facility: HOSPITAL | Age: 51
End: 2021-05-04

## 2021-05-10 ENCOUNTER — PATIENT MESSAGE (OUTPATIENT)
Dept: RESEARCH | Facility: HOSPITAL | Age: 51
End: 2021-05-10

## 2021-07-26 RX ORDER — NALTREXONE HYDROCHLORIDE AND BUPROPION HYDROCHLORIDE 8; 90 MG/1; MG/1
TABLET, EXTENDED RELEASE ORAL
Qty: 120 TABLET | Refills: 2 | Status: SHIPPED | OUTPATIENT
Start: 2021-07-26 | End: 2021-10-17 | Stop reason: SDUPTHER

## 2021-08-26 ENCOUNTER — PATIENT MESSAGE (OUTPATIENT)
Dept: INTERNAL MEDICINE | Facility: CLINIC | Age: 51
End: 2021-08-26

## 2021-08-27 ENCOUNTER — PATIENT MESSAGE (OUTPATIENT)
Dept: INTERNAL MEDICINE | Facility: CLINIC | Age: 51
End: 2021-08-27

## 2021-10-05 ENCOUNTER — PATIENT MESSAGE (OUTPATIENT)
Dept: INTERNAL MEDICINE | Facility: CLINIC | Age: 51
End: 2021-10-05

## 2021-10-09 DIAGNOSIS — I10 BENIGN ESSENTIAL HTN: ICD-10-CM

## 2021-10-11 ENCOUNTER — OFFICE VISIT (OUTPATIENT)
Dept: INTERNAL MEDICINE | Facility: CLINIC | Age: 51
End: 2021-10-11
Payer: COMMERCIAL

## 2021-10-11 ENCOUNTER — LAB VISIT (OUTPATIENT)
Dept: LAB | Facility: HOSPITAL | Age: 51
End: 2021-10-11
Attending: NURSE PRACTITIONER
Payer: COMMERCIAL

## 2021-10-11 VITALS
BODY MASS INDEX: 36.28 KG/M2 | SYSTOLIC BLOOD PRESSURE: 130 MMHG | OXYGEN SATURATION: 98 % | RESPIRATION RATE: 18 BRPM | DIASTOLIC BLOOD PRESSURE: 86 MMHG | HEART RATE: 66 BPM | WEIGHT: 225.75 LBS | HEIGHT: 66 IN

## 2021-10-11 DIAGNOSIS — E66.01 SEVERE OBESITY (BMI 35.0-39.9) WITH COMORBIDITY: ICD-10-CM

## 2021-10-11 DIAGNOSIS — M25.532 LEFT WRIST PAIN: ICD-10-CM

## 2021-10-11 DIAGNOSIS — R29.898 RIGHT LEG WEAKNESS: ICD-10-CM

## 2021-10-11 DIAGNOSIS — G89.29 CHRONIC HEEL PAIN, LEFT: ICD-10-CM

## 2021-10-11 DIAGNOSIS — D50.9 IRON DEFICIENCY ANEMIA, UNSPECIFIED IRON DEFICIENCY ANEMIA TYPE: ICD-10-CM

## 2021-10-11 DIAGNOSIS — I10 BENIGN ESSENTIAL HTN: ICD-10-CM

## 2021-10-11 DIAGNOSIS — G89.29 CHRONIC HEEL PAIN, LEFT: Primary | ICD-10-CM

## 2021-10-11 DIAGNOSIS — M79.672 CHRONIC HEEL PAIN, LEFT: ICD-10-CM

## 2021-10-11 DIAGNOSIS — M79.672 CHRONIC HEEL PAIN, LEFT: Primary | ICD-10-CM

## 2021-10-11 LAB
ALBUMIN SERPL BCP-MCNC: 3.6 G/DL (ref 3.5–5.2)
ALP SERPL-CCNC: 82 U/L (ref 55–135)
ALT SERPL W/O P-5'-P-CCNC: 19 U/L (ref 10–44)
ANION GAP SERPL CALC-SCNC: 6 MMOL/L (ref 8–16)
AST SERPL-CCNC: 16 U/L (ref 10–40)
BASOPHILS # BLD AUTO: 0.04 K/UL (ref 0–0.2)
BASOPHILS NFR BLD: 0.8 % (ref 0–1.9)
BILIRUB SERPL-MCNC: 0.5 MG/DL (ref 0.1–1)
BUN SERPL-MCNC: 11 MG/DL (ref 6–20)
CALCIUM SERPL-MCNC: 9.5 MG/DL (ref 8.7–10.5)
CHLORIDE SERPL-SCNC: 107 MMOL/L (ref 95–110)
CHOLEST SERPL-MCNC: 178 MG/DL (ref 120–199)
CHOLEST/HDLC SERPL: 3.4 {RATIO} (ref 2–5)
CO2 SERPL-SCNC: 29 MMOL/L (ref 23–29)
CREAT SERPL-MCNC: 0.8 MG/DL (ref 0.5–1.4)
DIFFERENTIAL METHOD: ABNORMAL
EOSINOPHIL # BLD AUTO: 0.2 K/UL (ref 0–0.5)
EOSINOPHIL NFR BLD: 3.1 % (ref 0–8)
ERYTHROCYTE [DISTWIDTH] IN BLOOD BY AUTOMATED COUNT: 12.6 % (ref 11.5–14.5)
EST. GFR  (AFRICAN AMERICAN): >60 ML/MIN/1.73 M^2
EST. GFR  (NON AFRICAN AMERICAN): >60 ML/MIN/1.73 M^2
FERRITIN SERPL-MCNC: 24 NG/ML (ref 20–300)
GLUCOSE SERPL-MCNC: 92 MG/DL (ref 70–110)
HCT VFR BLD AUTO: 39.2 % (ref 37–48.5)
HDLC SERPL-MCNC: 53 MG/DL (ref 40–75)
HDLC SERPL: 29.8 % (ref 20–50)
HGB BLD-MCNC: 12.5 G/DL (ref 12–16)
IMM GRANULOCYTES # BLD AUTO: 0.01 K/UL (ref 0–0.04)
IMM GRANULOCYTES NFR BLD AUTO: 0.2 % (ref 0–0.5)
IRON SERPL-MCNC: 58 UG/DL (ref 30–160)
LDLC SERPL CALC-MCNC: 111.6 MG/DL (ref 63–159)
LYMPHOCYTES # BLD AUTO: 1.7 K/UL (ref 1–4.8)
LYMPHOCYTES NFR BLD: 35.6 % (ref 18–48)
MCH RBC QN AUTO: 27.8 PG (ref 27–31)
MCHC RBC AUTO-ENTMCNC: 31.9 G/DL (ref 32–36)
MCV RBC AUTO: 87 FL (ref 82–98)
MONOCYTES # BLD AUTO: 0.6 K/UL (ref 0.3–1)
MONOCYTES NFR BLD: 11.2 % (ref 4–15)
NEUTROPHILS # BLD AUTO: 2.4 K/UL (ref 1.8–7.7)
NEUTROPHILS NFR BLD: 49.1 % (ref 38–73)
NONHDLC SERPL-MCNC: 125 MG/DL
NRBC BLD-RTO: 0 /100 WBC
PLATELET # BLD AUTO: 213 K/UL (ref 150–450)
PMV BLD AUTO: 10.1 FL (ref 9.2–12.9)
POTASSIUM SERPL-SCNC: 4.1 MMOL/L (ref 3.5–5.1)
PROT SERPL-MCNC: 6.6 G/DL (ref 6–8.4)
RBC # BLD AUTO: 4.49 M/UL (ref 4–5.4)
SATURATED IRON: 18 % (ref 20–50)
SODIUM SERPL-SCNC: 142 MMOL/L (ref 136–145)
TOTAL IRON BINDING CAPACITY: 324 UG/DL (ref 250–450)
TRANSFERRIN SERPL-MCNC: 219 MG/DL (ref 200–375)
TRIGL SERPL-MCNC: 67 MG/DL (ref 30–150)
TSH SERPL DL<=0.005 MIU/L-ACNC: 1.86 UIU/ML (ref 0.4–4)
WBC # BLD AUTO: 4.89 K/UL (ref 3.9–12.7)

## 2021-10-11 PROCEDURE — 3075F SYST BP GE 130 - 139MM HG: CPT | Mod: CPTII,S$GLB,, | Performed by: NURSE PRACTITIONER

## 2021-10-11 PROCEDURE — 99999 PR PBB SHADOW E&M-EST. PATIENT-LVL IV: CPT | Mod: PBBFAC,,, | Performed by: NURSE PRACTITIONER

## 2021-10-11 PROCEDURE — 3079F PR MOST RECENT DIASTOLIC BLOOD PRESSURE 80-89 MM HG: ICD-10-PCS | Mod: CPTII,S$GLB,, | Performed by: NURSE PRACTITIONER

## 2021-10-11 PROCEDURE — 99214 PR OFFICE/OUTPT VISIT, EST, LEVL IV, 30-39 MIN: ICD-10-PCS | Mod: S$GLB,,, | Performed by: NURSE PRACTITIONER

## 2021-10-11 PROCEDURE — 80053 COMPREHEN METABOLIC PANEL: CPT | Performed by: NURSE PRACTITIONER

## 2021-10-11 PROCEDURE — 85025 COMPLETE CBC W/AUTO DIFF WBC: CPT | Performed by: NURSE PRACTITIONER

## 2021-10-11 PROCEDURE — 99214 OFFICE O/P EST MOD 30 MIN: CPT | Mod: S$GLB,,, | Performed by: NURSE PRACTITIONER

## 2021-10-11 PROCEDURE — 99999 PR PBB SHADOW E&M-EST. PATIENT-LVL IV: ICD-10-PCS | Mod: PBBFAC,,, | Performed by: NURSE PRACTITIONER

## 2021-10-11 PROCEDURE — 3079F DIAST BP 80-89 MM HG: CPT | Mod: CPTII,S$GLB,, | Performed by: NURSE PRACTITIONER

## 2021-10-11 PROCEDURE — 80061 LIPID PANEL: CPT | Performed by: NURSE PRACTITIONER

## 2021-10-11 PROCEDURE — 4010F ACE/ARB THERAPY RXD/TAKEN: CPT | Mod: CPTII,S$GLB,, | Performed by: NURSE PRACTITIONER

## 2021-10-11 PROCEDURE — 84443 ASSAY THYROID STIM HORMONE: CPT | Performed by: NURSE PRACTITIONER

## 2021-10-11 PROCEDURE — 1160F PR REVIEW ALL MEDS BY PRESCRIBER/CLIN PHARMACIST DOCUMENTED: ICD-10-PCS | Mod: CPTII,S$GLB,, | Performed by: NURSE PRACTITIONER

## 2021-10-11 PROCEDURE — 84466 ASSAY OF TRANSFERRIN: CPT | Performed by: NURSE PRACTITIONER

## 2021-10-11 PROCEDURE — 3075F PR MOST RECENT SYSTOLIC BLOOD PRESS GE 130-139MM HG: ICD-10-PCS | Mod: CPTII,S$GLB,, | Performed by: NURSE PRACTITIONER

## 2021-10-11 PROCEDURE — 36415 COLL VENOUS BLD VENIPUNCTURE: CPT | Performed by: NURSE PRACTITIONER

## 2021-10-11 PROCEDURE — 1160F RVW MEDS BY RX/DR IN RCRD: CPT | Mod: CPTII,S$GLB,, | Performed by: NURSE PRACTITIONER

## 2021-10-11 PROCEDURE — 1159F PR MEDICATION LIST DOCUMENTED IN MEDICAL RECORD: ICD-10-PCS | Mod: CPTII,S$GLB,, | Performed by: NURSE PRACTITIONER

## 2021-10-11 PROCEDURE — 3008F PR BODY MASS INDEX (BMI) DOCUMENTED: ICD-10-PCS | Mod: CPTII,S$GLB,, | Performed by: NURSE PRACTITIONER

## 2021-10-11 PROCEDURE — 4010F PR ACE/ARB THEARPY RXD/TAKEN: ICD-10-PCS | Mod: CPTII,S$GLB,, | Performed by: NURSE PRACTITIONER

## 2021-10-11 PROCEDURE — 82728 ASSAY OF FERRITIN: CPT | Performed by: NURSE PRACTITIONER

## 2021-10-11 PROCEDURE — 3008F BODY MASS INDEX DOCD: CPT | Mod: CPTII,S$GLB,, | Performed by: NURSE PRACTITIONER

## 2021-10-11 PROCEDURE — 1159F MED LIST DOCD IN RCRD: CPT | Mod: CPTII,S$GLB,, | Performed by: NURSE PRACTITIONER

## 2021-10-11 RX ORDER — ATORVASTATIN CALCIUM 10 MG/1
10 TABLET, FILM COATED ORAL DAILY
COMMUNITY
Start: 2021-08-23 | End: 2022-03-08

## 2021-10-11 RX ORDER — IRBESARTAN 150 MG/1
TABLET ORAL
Qty: 90 TABLET | Refills: 1 | Status: SHIPPED | OUTPATIENT
Start: 2021-10-11 | End: 2022-04-25

## 2021-10-11 RX ORDER — IRBESARTAN 150 MG/1
150 TABLET ORAL NIGHTLY
Qty: 90 TABLET | Refills: 1 | Status: SHIPPED | OUTPATIENT
Start: 2021-10-11 | End: 2022-03-08 | Stop reason: SDUPTHER

## 2021-10-11 RX ORDER — OMEPRAZOLE 20 MG/1
20 CAPSULE, DELAYED RELEASE ORAL DAILY
Qty: 90 CAPSULE | Refills: 1 | Status: SHIPPED | OUTPATIENT
Start: 2021-10-11

## 2021-12-26 ENCOUNTER — HOSPITAL ENCOUNTER (EMERGENCY)
Facility: HOSPITAL | Age: 51
Discharge: HOME | End: 2021-12-26
Attending: STUDENT IN AN ORGANIZED HEALTH CARE EDUCATION/TRAINING PROGRAM
Payer: COMMERCIAL

## 2021-12-26 VITALS
HEART RATE: 96 BPM | DIASTOLIC BLOOD PRESSURE: 87 MMHG | BODY MASS INDEX: 34.02 KG/M2 | RESPIRATION RATE: 20 BRPM | SYSTOLIC BLOOD PRESSURE: 147 MMHG | WEIGHT: 210.75 LBS | OXYGEN SATURATION: 98 % | TEMPERATURE: 101 F

## 2021-12-26 DIAGNOSIS — U07.1 COVID-19 VIRUS INFECTION: Primary | ICD-10-CM

## 2021-12-26 LAB — SARS-COV-2 RDRP RESP QL NAA+PROBE: POSITIVE

## 2021-12-26 PROCEDURE — U0002 COVID-19 LAB TEST NON-CDC: HCPCS | Performed by: STUDENT IN AN ORGANIZED HEALTH CARE EDUCATION/TRAINING PROGRAM

## 2021-12-26 PROCEDURE — 99284 EMERGENCY DEPT VISIT MOD MDM: CPT

## 2021-12-26 RX ORDER — AZITHROMYCIN 250 MG/1
TABLET, FILM COATED ORAL
Qty: 6 TABLET | Refills: 0 | Status: SHIPPED | OUTPATIENT
Start: 2021-12-26 | End: 2022-03-04

## 2021-12-26 RX ORDER — CETIRIZINE HYDROCHLORIDE 10 MG/1
10 TABLET ORAL DAILY
Qty: 30 TABLET | Refills: 0 | Status: SHIPPED | OUTPATIENT
Start: 2021-12-26 | End: 2022-03-08

## 2021-12-26 RX ORDER — BENZONATATE 100 MG/1
200 CAPSULE ORAL 3 TIMES DAILY PRN
Qty: 20 CAPSULE | Refills: 0 | Status: SHIPPED | OUTPATIENT
Start: 2021-12-26 | End: 2022-01-05

## 2021-12-26 RX ORDER — METHYLPREDNISOLONE 4 MG/1
TABLET ORAL
Qty: 1 EACH | Refills: 0 | Status: SHIPPED | OUTPATIENT
Start: 2021-12-26 | End: 2022-03-04

## 2021-12-26 NOTE — Clinical Note
"Analia"Snehal Apodaca was seen and treated in our emergency department on 12/26/2021.     COVID-19 is present in our communities across the state. There is limited testing for COVID at this time, so not all patients can be tested. In this situation, your employee meets the following criteria:    Analia Apodaca has met the criteria for COVID-19 testing and has a POSITIVE result. She can return to work once they are asymptomatic for 72 hours without the use of fever reducing medications AND at least ten days from the first positive result.     If you have any questions or concerns, or if I can be of further assistance, please do not hesitate to contact me.    Sincerely,              RN"

## 2021-12-27 ENCOUNTER — INFUSION (OUTPATIENT)
Dept: INFECTIOUS DISEASES | Facility: HOSPITAL | Age: 51
End: 2021-12-27
Attending: SURGERY
Payer: COMMERCIAL

## 2021-12-27 VITALS
HEART RATE: 67 BPM | TEMPERATURE: 98 F | DIASTOLIC BLOOD PRESSURE: 84 MMHG | OXYGEN SATURATION: 97 % | RESPIRATION RATE: 16 BRPM | SYSTOLIC BLOOD PRESSURE: 105 MMHG

## 2021-12-27 DIAGNOSIS — U07.1 COVID-19 VIRUS INFECTION: ICD-10-CM

## 2021-12-27 PROCEDURE — 63600175 PHARM REV CODE 636 W HCPCS: Performed by: SURGERY

## 2021-12-27 PROCEDURE — 25000003 PHARM REV CODE 250: Performed by: SURGERY

## 2021-12-27 PROCEDURE — M0243 CASIRIVI AND IMDEVI INFUSION: HCPCS | Performed by: SURGERY

## 2021-12-27 RX ORDER — DIPHENHYDRAMINE HYDROCHLORIDE 50 MG/ML
25 INJECTION INTRAMUSCULAR; INTRAVENOUS ONCE AS NEEDED
Status: DISCONTINUED | OUTPATIENT
Start: 2021-12-27 | End: 2022-03-08

## 2021-12-27 RX ORDER — ACETAMINOPHEN 325 MG/1
650 TABLET ORAL ONCE AS NEEDED
Status: DISCONTINUED | OUTPATIENT
Start: 2021-12-27 | End: 2022-03-08

## 2021-12-27 RX ORDER — ALBUTEROL SULFATE 90 UG/1
2 AEROSOL, METERED RESPIRATORY (INHALATION)
Status: DISCONTINUED | OUTPATIENT
Start: 2021-12-27 | End: 2022-03-08

## 2021-12-27 RX ORDER — EPINEPHRINE 0.3 MG/.3ML
0.3 INJECTION SUBCUTANEOUS
Status: DISCONTINUED | OUTPATIENT
Start: 2021-12-27 | End: 2022-03-08

## 2021-12-27 RX ORDER — ONDANSETRON 4 MG/1
4 TABLET, ORALLY DISINTEGRATING ORAL ONCE AS NEEDED
Status: DISCONTINUED | OUTPATIENT
Start: 2021-12-27 | End: 2022-03-08

## 2021-12-27 RX ORDER — SODIUM CHLORIDE 0.9 % (FLUSH) 0.9 %
10 SYRINGE (ML) INJECTION
Status: DISCONTINUED | OUTPATIENT
Start: 2021-12-27 | End: 2022-03-08

## 2021-12-27 RX ADMIN — CASIRIVIMAB AND IMDEVIMAB 600 MG: 600; 600 INJECTION, SOLUTION, CONCENTRATE INTRAVENOUS at 08:12

## 2021-12-27 NOTE — ED PROVIDER NOTES
[facility]                                                 I reviewed the ER triage nurse's note before evaluating the patient    Chief Complaint  51 y.o. female with Headache     History of Present Illness  Analia Apodaca presents to the emergency room with nasal congestion today  Patient with clear nasal drainage with nasal mucosa erythema on evaluation  Patient with no wheezing or sputum or shortness of breath, no signs of distress  Patient with no nausea vomiting diarrhea, postnasal drip noted in the ER now  Patient has resulting headache, no meningeal signs, likely COVID virus dx    The history is provided by the patient  Previous medical records were obtained from [facility]  Previous records are summarized from prior ER visits and hospitalizations  Medical history significant for abnormal Pap smear, depression, GERD, hypertension  Surgeries significant for colonoscopy, tonsillectomy  No Known Allergies   No significant family history  No significant social history, nonsmoker    I have reviewed all of this patient's past medical, surgical, family, and social   histories as well as active allergies and medications documented in the  electronic medical record    Review of Systems and Physical Exam      Review of Systems (all other ROS are otherwise negative)  -- Constitution - subjective fever, denies fatigue, no weakness, no chills  -- Eyes - no tearing or redness, no visual disturbance  -- Ear, Nose - sneezing, nasal congestion and clear discharge   -- Mouth,Throat - sore throat, no toothache, normal voice, normal swallowing  -- Respiratory - cough and congestion, no shortness of breath, no sputum  -- Cardiovascular - denies chest pain, no palpitations, denies claudication  -- Gastrointestinal - denies abdominal pain, nausea, vomiting, or diarrhea  -- Genitourinary - no dysuria, no hematuria, no flank pain, no bladder pain  -- Musculoskeletal - denies back pain, negative for trauma or  injury  -- Neurological - no headache, denies weakness or seizure; no LOC  -- Skin - denies pallor, rash, or changes in skin. no hives or welts noted     Vital Signs (reviewed by the physician)  Her oral temperature is 100.6 °F (38.1 °C)  Her blood pressure is 147/87 and her pulse is 96.   Her respiration is 20 and oxygen saturation is 98%.     Physical Exam  -- Nursing note and vitals reviewed  -- Constitutional: Appears well-developed and well-nourished  -- Head: Atraumatic. Normocephalic. No obvious abnormality  -- Eyes: Pupils are equal and reactive to light. Normal conjunctiva and lids  -- Nose: nasal mucosa erythema and edema; clear nasal discharge noted   -- Throat: post-nasal drip with mild posterior oropharnyx erythema  -- Ears: External ears and TM normal bilaterally. Normal hearing and no drainage  -- Neck: Normal range of motion. Neck supple. No masses, trachea midline  -- Cardiac: Normal rate, regular rhythm and normal heart sounds  -- Respiratory: Normal respiratory effort, breath sounds clear to auscultation  -- Gastrointestinal: Soft, no tenderness. Normal bowel sounds. Normal liver edge  -- Musculoskeletal: Normal range of motion, no effusions. Joints stable   -- Neurological: No focal deficits. Showed good interaction with staff  -- Vascular: Posterior tibial, dorsalis pedis and radial pulses 2+ bilaterally     Emergency Room Course      Treatment and Evaluation  -- rapid Coronavirus PCR was positive    Medical Decision Making     ED Course/ED Management  -- Patient voiced complete understanding will self quarantine for 10 days  -- Patient will follow-up with primary care physician within next 48 hours  -- COVID medications prescribed on discharge, take pulse ox at home routinely  -- Patient will be enrolled in the COVID surveillance program on ER discharge  -- Ambulatory referral for COVID monoclonal antibody therapy on discharge   -- Tylenol and Motrin as needed for fever and symptom control  going forward  -- Will return to the ER with any concerning signs or symptoms of COVID    Assessment, Disposition, & Plan      Diagnosis  [U07.1] COVID-19 virus infection (Primary)    Disposition and Plan  -- Disposition: home  -- Condition: stable  -- Follow-up: Patient to follow up with Shital Loera NP in 1-2 days.  -- I advised the patient that we have found no life threatening condition today  -- At this time, I believe the patient is clinically stable for discharge.   -- Pt understands that the visit today is to address immediate concerns   -- Further workup and evaluation as an outpatient may be required  -- The patient acknowledges that close follow up with a MD is required   -- Patient agrees to comply with all instruction and direction given in the ER    This note is dictated on M*Modal word recognition program.  There are word recognition mistakes that are occasionally missed on review.         Richy Lindquist MD  12/26/21 1959

## 2022-01-11 RX ORDER — NALTREXONE HYDROCHLORIDE AND BUPROPION HYDROCHLORIDE 8; 90 MG/1; MG/1
TABLET, EXTENDED RELEASE ORAL
Qty: 120 TABLET | Refills: 2 | Status: SHIPPED | OUTPATIENT
Start: 2022-01-11 | End: 2022-04-04

## 2022-03-04 ENCOUNTER — OFFICE VISIT (OUTPATIENT)
Dept: INTERNAL MEDICINE | Facility: CLINIC | Age: 52
End: 2022-03-04
Payer: COMMERCIAL

## 2022-03-04 ENCOUNTER — TELEPHONE (OUTPATIENT)
Dept: ORTHOPEDICS | Facility: CLINIC | Age: 52
End: 2022-03-04
Payer: COMMERCIAL

## 2022-03-04 VITALS
OXYGEN SATURATION: 99 % | HEART RATE: 87 BPM | HEIGHT: 66 IN | SYSTOLIC BLOOD PRESSURE: 122 MMHG | RESPIRATION RATE: 18 BRPM | BODY MASS INDEX: 35.72 KG/M2 | WEIGHT: 222.25 LBS | DIASTOLIC BLOOD PRESSURE: 84 MMHG

## 2022-03-04 DIAGNOSIS — M25.522 LEFT ELBOW PAIN: Primary | ICD-10-CM

## 2022-03-04 DIAGNOSIS — M25.522 LEFT ELBOW PAIN: ICD-10-CM

## 2022-03-04 DIAGNOSIS — M25.531 RIGHT WRIST PAIN: ICD-10-CM

## 2022-03-04 DIAGNOSIS — M25.531 RIGHT WRIST PAIN: Primary | ICD-10-CM

## 2022-03-04 PROCEDURE — 99213 OFFICE O/P EST LOW 20 MIN: CPT | Mod: S$GLB,,, | Performed by: NURSE PRACTITIONER

## 2022-03-04 PROCEDURE — 99213 PR OFFICE/OUTPT VISIT, EST, LEVL III, 20-29 MIN: ICD-10-PCS | Mod: S$GLB,,, | Performed by: NURSE PRACTITIONER

## 2022-03-04 PROCEDURE — 4010F ACE/ARB THERAPY RXD/TAKEN: CPT | Mod: CPTII,S$GLB,, | Performed by: NURSE PRACTITIONER

## 2022-03-04 PROCEDURE — 3008F PR BODY MASS INDEX (BMI) DOCUMENTED: ICD-10-PCS | Mod: CPTII,S$GLB,, | Performed by: NURSE PRACTITIONER

## 2022-03-04 PROCEDURE — 3079F DIAST BP 80-89 MM HG: CPT | Mod: CPTII,S$GLB,, | Performed by: NURSE PRACTITIONER

## 2022-03-04 PROCEDURE — 3079F PR MOST RECENT DIASTOLIC BLOOD PRESSURE 80-89 MM HG: ICD-10-PCS | Mod: CPTII,S$GLB,, | Performed by: NURSE PRACTITIONER

## 2022-03-04 PROCEDURE — 99999 PR PBB SHADOW E&M-EST. PATIENT-LVL V: ICD-10-PCS | Mod: PBBFAC,,, | Performed by: NURSE PRACTITIONER

## 2022-03-04 PROCEDURE — 3008F BODY MASS INDEX DOCD: CPT | Mod: CPTII,S$GLB,, | Performed by: NURSE PRACTITIONER

## 2022-03-04 PROCEDURE — 99999 PR PBB SHADOW E&M-EST. PATIENT-LVL V: CPT | Mod: PBBFAC,,, | Performed by: NURSE PRACTITIONER

## 2022-03-04 PROCEDURE — 3074F PR MOST RECENT SYSTOLIC BLOOD PRESSURE < 130 MM HG: ICD-10-PCS | Mod: CPTII,S$GLB,, | Performed by: NURSE PRACTITIONER

## 2022-03-04 PROCEDURE — 4010F PR ACE/ARB THEARPY RXD/TAKEN: ICD-10-PCS | Mod: CPTII,S$GLB,, | Performed by: NURSE PRACTITIONER

## 2022-03-04 PROCEDURE — 1159F MED LIST DOCD IN RCRD: CPT | Mod: CPTII,S$GLB,, | Performed by: NURSE PRACTITIONER

## 2022-03-04 PROCEDURE — 1159F PR MEDICATION LIST DOCUMENTED IN MEDICAL RECORD: ICD-10-PCS | Mod: CPTII,S$GLB,, | Performed by: NURSE PRACTITIONER

## 2022-03-04 PROCEDURE — 3074F SYST BP LT 130 MM HG: CPT | Mod: CPTII,S$GLB,, | Performed by: NURSE PRACTITIONER

## 2022-03-04 NOTE — TELEPHONE ENCOUNTER
----- Message from Lynn Knight sent at 3/4/2022  8:56 AM CST -----  Regarding: Request to speak to a nurse  Contact: self  Analia Apodaca  MRN: [id number]  : 1970  PCP: Shital Loera  Home Phone      [phone]  Work Phone      Not on file.  Mobile          [phone]      MESSAGE:   Request to speak to a nurse regarding symptoms c/o pain to left elbow to shoulder, and right wrist.  PCP - Shital Loera NP referred patient to orthopedic PA.     Phone # [phone]    Pharmacy - [id number] [address] [facility] [address]

## 2022-03-04 NOTE — TELEPHONE ENCOUNTER
Xrays ordered. Appointments scheduled per patient request. Patient notified. Voiced understanding.

## 2022-03-04 NOTE — PROGRESS NOTES
Subjective:       Patient ID: Analia Apodaca is a 51 y.o. female.    Chief Complaint: Arm Pain (L. Side- from shoulder down to elbow PS:4) and Wrist Pain (R. Side - from wrist/hand area into arm )    Patient is known, to me and presents with   Chief Complaint   Patient presents with    Arm Pain     L. Side- from shoulder down to elbow PS:4    Wrist Pain     R. Side - from wrist/hand area into arm    .  Denies chest pain and shortness of breath.  Patient who is known to me presents with ongoing issues with right wrist pain and left elbow pain that radiates to left shoulder. She has had this issues for some time now. 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 She takes NSAIDS with some relief. She does drop things at time in her right hand.  No redness, warmth, or swelling to joints. No fever or weight loss.   HPI  Review of Systems   Constitutional: Negative.    Respiratory: Negative.    Cardiovascular: Negative.    Musculoskeletal: Positive for arthralgias.   Skin: Negative.        Objective:      Physical Exam  Constitutional:       General: She is not in acute distress.     Appearance: Normal appearance. She is obese. She is not ill-appearing, toxic-appearing or diaphoretic.   Cardiovascular:      Rate and Rhythm: Normal rate and regular rhythm.      Heart sounds: Normal heart sounds. No murmur heard.  Pulmonary:      Effort: Pulmonary effort is normal.      Breath sounds: Normal breath sounds. No wheezing or rhonchi.   Musculoskeletal:         General: Tenderness present. No swelling, deformity or signs of injury.      Left elbow: Decreased range of motion.      Right wrist: Tenderness and bony tenderness present. Decreased range of motion.        Arms:       Right lower leg: No edema.      Left lower leg: No edema.   Skin:     General: Skin is warm and dry.      Capillary Refill: Capillary refill takes less than 2 seconds.      Coloration: Skin is not jaundiced  "or pale.      Findings: No bruising, erythema, lesion or rash.   Neurological:      Mental Status: She is alert.         Assessment:       1. Right wrist pain    2. Left elbow pain        Plan:   Analia was seen today for arm pain and wrist pain.    Diagnoses and all orders for this visit:    Right wrist pain  -     Ambulatory referral/consult to Orthopedics; Future    Left elbow pain  -     Ambulatory referral/consult to Orthopedics; Future    "This note will not be shared with the patient."  instructed to continue with nsaid's for now but will send to ortho for further evaluation since this is ongoing  rtc as scheduled  "

## 2022-03-08 ENCOUNTER — HOSPITAL ENCOUNTER (OUTPATIENT)
Dept: RADIOLOGY | Facility: HOSPITAL | Age: 52
Discharge: HOME | End: 2022-03-08
Attending: PHYSICIAN ASSISTANT
Payer: COMMERCIAL

## 2022-03-08 ENCOUNTER — OFFICE VISIT (OUTPATIENT)
Dept: ORTHOPEDICS | Facility: CLINIC | Age: 52
End: 2022-03-08
Payer: COMMERCIAL

## 2022-03-08 VITALS
DIASTOLIC BLOOD PRESSURE: 88 MMHG | BODY MASS INDEX: 35.5 KG/M2 | SYSTOLIC BLOOD PRESSURE: 124 MMHG | HEIGHT: 66 IN | WEIGHT: 220.88 LBS | RESPIRATION RATE: 18 BRPM | HEART RATE: 74 BPM

## 2022-03-08 DIAGNOSIS — M25.531 RIGHT WRIST PAIN: ICD-10-CM

## 2022-03-08 DIAGNOSIS — M25.522 LEFT ELBOW PAIN: Primary | ICD-10-CM

## 2022-03-08 DIAGNOSIS — M25.522 LEFT ELBOW PAIN: ICD-10-CM

## 2022-03-08 PROCEDURE — 99203 PR OFFICE/OUTPT VISIT, NEW, LEVL III, 30-44 MIN: ICD-10-PCS | Mod: S$GLB,,, | Performed by: PHYSICIAN ASSISTANT

## 2022-03-08 PROCEDURE — 3008F PR BODY MASS INDEX (BMI) DOCUMENTED: ICD-10-PCS | Mod: CPTII,S$GLB,, | Performed by: PHYSICIAN ASSISTANT

## 2022-03-08 PROCEDURE — 1160F RVW MEDS BY RX/DR IN RCRD: CPT | Mod: CPTII,S$GLB,, | Performed by: PHYSICIAN ASSISTANT

## 2022-03-08 PROCEDURE — 99999 PR PBB SHADOW E&M-EST. PATIENT-LVL IV: ICD-10-PCS | Mod: PBBFAC,,, | Performed by: PHYSICIAN ASSISTANT

## 2022-03-08 PROCEDURE — 4010F ACE/ARB THERAPY RXD/TAKEN: CPT | Mod: CPTII,S$GLB,, | Performed by: PHYSICIAN ASSISTANT

## 2022-03-08 PROCEDURE — 73080 X-RAY EXAM OF ELBOW: CPT | Mod: TC,LT

## 2022-03-08 PROCEDURE — 99999 PR PBB SHADOW E&M-EST. PATIENT-LVL IV: CPT | Mod: PBBFAC,,, | Performed by: PHYSICIAN ASSISTANT

## 2022-03-08 PROCEDURE — 73080 X-RAY EXAM OF ELBOW: CPT | Mod: 26,LT,, | Performed by: RADIOLOGY

## 2022-03-08 PROCEDURE — 3074F PR MOST RECENT SYSTOLIC BLOOD PRESSURE < 130 MM HG: ICD-10-PCS | Mod: CPTII,S$GLB,, | Performed by: PHYSICIAN ASSISTANT

## 2022-03-08 PROCEDURE — 3074F SYST BP LT 130 MM HG: CPT | Mod: CPTII,S$GLB,, | Performed by: PHYSICIAN ASSISTANT

## 2022-03-08 PROCEDURE — 1159F PR MEDICATION LIST DOCUMENTED IN MEDICAL RECORD: ICD-10-PCS | Mod: CPTII,S$GLB,, | Performed by: PHYSICIAN ASSISTANT

## 2022-03-08 PROCEDURE — 73110 X-RAY EXAM OF WRIST: CPT | Mod: TC,RT

## 2022-03-08 PROCEDURE — 3079F DIAST BP 80-89 MM HG: CPT | Mod: CPTII,S$GLB,, | Performed by: PHYSICIAN ASSISTANT

## 2022-03-08 PROCEDURE — 3079F PR MOST RECENT DIASTOLIC BLOOD PRESSURE 80-89 MM HG: ICD-10-PCS | Mod: CPTII,S$GLB,, | Performed by: PHYSICIAN ASSISTANT

## 2022-03-08 PROCEDURE — 73080 XR ELBOW COMPLETE 3 VIEW LEFT: ICD-10-PCS | Mod: 26,LT,, | Performed by: RADIOLOGY

## 2022-03-08 PROCEDURE — 1160F PR REVIEW ALL MEDS BY PRESCRIBER/CLIN PHARMACIST DOCUMENTED: ICD-10-PCS | Mod: CPTII,S$GLB,, | Performed by: PHYSICIAN ASSISTANT

## 2022-03-08 PROCEDURE — 1159F MED LIST DOCD IN RCRD: CPT | Mod: CPTII,S$GLB,, | Performed by: PHYSICIAN ASSISTANT

## 2022-03-08 PROCEDURE — 73110 X-RAY EXAM OF WRIST: CPT | Mod: 26,RT,, | Performed by: RADIOLOGY

## 2022-03-08 PROCEDURE — 73110 XR WRIST COMPLETE 3 VIEWS RIGHT: ICD-10-PCS | Mod: 26,RT,, | Performed by: RADIOLOGY

## 2022-03-08 PROCEDURE — 99203 OFFICE O/P NEW LOW 30 MIN: CPT | Mod: S$GLB,,, | Performed by: PHYSICIAN ASSISTANT

## 2022-03-08 PROCEDURE — 4010F PR ACE/ARB THEARPY RXD/TAKEN: ICD-10-PCS | Mod: CPTII,S$GLB,, | Performed by: PHYSICIAN ASSISTANT

## 2022-03-08 PROCEDURE — 3008F BODY MASS INDEX DOCD: CPT | Mod: CPTII,S$GLB,, | Performed by: PHYSICIAN ASSISTANT

## 2022-03-08 NOTE — PROGRESS NOTES
Subjective:      Patient ID: Analia Apodaca is a 51 y.o. female.    Chief Complaint: Pain of the Right Wrist and Pain of the Left Elbow    Review of patient's allergies indicates:  No Known Allergies     52 yo RHD F presents to clinic with c/o right wrist pain and left elbow pain x 1 week.  Denies any injury or trauma.  Right wrist pain was sharp and located to dorsal wrist, has completely improved over past few days.  Left elbow pain is sharp/achy, located to medial and lateral elbow, and worse with movement, improves with rest.  Pain sometimes radiates into upper arm.  No swelling.  No numbness/tingling.  She is taking ibuprofen as prescribed by PCP with some relief.      Review of Systems   Constitutional: Negative for chills, diaphoresis and fever.   HENT: Negative for congestion, ear discharge and ear pain.    Eyes: Negative for blurred vision, discharge, double vision and pain.   Cardiovascular: Negative for chest pain, claudication and cyanosis.   Respiratory: Negative for cough, hemoptysis and shortness of breath.    Endocrine: Negative for cold intolerance and heat intolerance.   Skin: Negative for color change, dry skin, itching and rash.   Musculoskeletal: Positive for joint pain. Negative for arthritis, back pain, falls, gout, joint swelling, muscle weakness and neck pain.   Gastrointestinal: Negative for abdominal pain and change in bowel habit.   Neurological: Negative for brief paralysis, disturbances in coordination, dizziness, numbness and paresthesias.   Psychiatric/Behavioral: Negative for altered mental status and depression.         Objective:          General    Constitutional: She is oriented to person, place, and time. She appears well-developed and well-nourished. No distress.   HENT:   Head: Atraumatic.   Eyes: EOM are normal. Right eye exhibits no discharge. Left eye exhibits no discharge.   Cardiovascular: Normal rate.    Pulmonary/Chest: Effort normal. No respiratory distress.    Abdominal: Soft.   Neurological: She is alert and oriented to person, place, and time.   Psychiatric: She has a normal mood and affect. Her behavior is normal.             Right Hand/Wrist Exam     Inspection   Scars: Wrist - absent   Effusion: Wrist - absent   Bruising: Wrist - absent   Deformity: Wrist - deformity     Range of Motion   The patient has normal right hand/wrist ROM.    Tests   Phalens Sign: negative  Tinel's sign (median nerve): negative  Finkelstein's test: negative  Carpal Tunnel Compression Test: negative  Cubital Tunnel Compression Test: negative      Other     Neuorologic Exam    Median Distribution: normal  Ulnar Distribution: normal  Radial Distribution: normal      Left Hand/Wrist Exam     Inspection   Scars: Wrist - absent   Effusion: Wrist - absent   Bruising: Wrist - absent   Deformity: Wrist - absent     Range of Motion   The patient has normal left hand/wrist ROM.    Tests   Phalens Sign: negative  Tinel's sign (median nerve): negative  Finkelstein's test: negative  Carpal Tunnel Compression Test: negative  Cubital Tunnel Compression Test: negative      Other     Sensory Exam  Median Distribution: normal  Ulnar Distribution: normal  Radial Distribution: normal      Right Elbow Exam     Inspection   Scars: absent  Effusion: absent  Bruising: absent  Deformity: absent    Range of Motion   The patient has normal right elbow ROM.    Tests   Tinel's sign (cubital tunnel): negative    Other   Sensation: normal and none    Comments:  No wrist pain/swelling/tenderness      Left Elbow Exam     Inspection   Scars: absent  Effusion: absent  Bruising: absent  Deformity: absent    Pain   The patient exhibits pain of the lateral epicondyle, medial epicondyle and olecranon    Tenderness   The patient is tender to palpation of the medial epicondyle and lateral epicondyle.     Range of Motion   The patient has normal left elbow ROM.    Tests   Tinel's sign (cubital tunnel): negative    Other    Sensation: normal        Muscle Strength   Right Upper Extremity   Wrist extension: 5/5   : 5/5   Elbow Pronation:  5/5   Elbow Supination:  5/5   Elbow Extension: 5/5  Elbow Flexion: 5/5  Left Upper Extremity  Wrist extension: 5/5   Wrist flexion: 5/5   :  5/5   Elbow Pronation:  5/5   Elbow Supination:  5/5   Elbow Extension: 5/5  Elbow Flexion: 5/5    Vascular Exam       Capillary Refill  Right Hand: normal capillary refill  Left Hand: normal capillary refill    Edema  Right Forearm: absent  Left Forearm: absent              Assessment:         Xray Right Wrist 3/8/22:  Mild radiocarpal joint space narrowing.    Xray Left Elbow 3/8/22:  No evidence of fracture.No significant degenerative changes      Encounter Diagnoses   Name Primary?    Right wrist pain     Left elbow pain Yes    Left elbow pain  -     Ambulatory referral/consult to Orthopedics  -     Ambulatory referral/consult to Physical/Occupational Therapy; Future; Expected date: 03/15/2022    Right wrist pain  -     Ambulatory referral/consult to Orthopedics               Plan:         We discussed treatment options. Pt would like to proceed with PT at this time. She will also continue ibuprofen as needed.    1. Ibuprofen as prescribed by PCP as needed.  2. Ambulatory referral to physical therapy for elbow strengthening and tennis elbow protocol.  3. Ice compress to the affected area 2-3x a day for 15-20 minutes as needed for pain management.  4. RTC in 6 weeks for follow-up, sooner if symptoms continue/worsen or if any new signs or symptoms.    Patient voices understanding of and agreement with treatment plan. All of the patient's questions were answered and the patient will contact us if she has any questions or concerns in the interim.

## 2022-03-15 ENCOUNTER — CLINICAL SUPPORT (OUTPATIENT)
Dept: REHABILITATION | Facility: HOSPITAL | Age: 52
End: 2022-03-15
Payer: COMMERCIAL

## 2022-03-15 DIAGNOSIS — M25.522 LEFT ELBOW PAIN: ICD-10-CM

## 2022-03-15 NOTE — PLAN OF CARE
Occupational Therapy  Initial Evaluation     Date: 3/15/2022  Name: Analia Apodaca  Clinic Number: [id number]    Therapy Diagnosis:   Encounter Diagnosis   Name Primary?    Left elbow pain      Physician: Clara Clark PA-C    Physician Orders: ***  Medical Diagnosis: ***  Surgical Procedure and Date: ***, ***/ Date of Injury/Onset: months, however cannot quite remember when   Evaluation Date: ***  Insurance Authorization Period Expiration: ***  Plan of Care Certification Period: ***  Date of Return to MD: ***    Visit # / Visits authorized: *** / ***    FOTO: initial eval  Medicare Amount:     Time In:***  Time Out: ***  Total treatment time: ***minutes  Total Timed minutes*** minutes    Precautions:  {IP WOUND PRECAUTIONS OHS:36868}    Subjective     Involved Side: left  Dominant Side: Right  Date of Onset: ***  Mechanism of Injury: ***  History of Current Condition: ***  Surgical Procedure: ***  Imaging: {Mri/ctscan/bone scan:40386} ***  Previous Therapy: ***    Past Medical History/Physical Systems Review:   Analia Apodaca  has a past medical history of Abnormal Pap smear of cervix, Depression, GERD (gastroesophageal reflux disease), and Hypertension.    Analia Apodaca  has a past surgical history that includes Tonsillectomy; Excision of nodule; and Colonoscopy (N/A, 3/16/2020).    Analia has a current medication list which includes the following prescription(s): contrave, famotidine, ibuprofen, irbesartan, and omeprazole.    Review of patient's allergies indicates:  No Known Allergies     Patient's Goals for Therapy: ***    Pain:  Functional Pain Scale Rating 0-10:   6/10 on average  1/10 at best  10/10 at worst  Location: from lateral medial elbow to shoulder most times, however currently mostly in shoulder  Description: Sharp and Shooting  Aggravating Factors: putting weight through arm and moving shoulder  Easing Factors: ibuprofen     Occupation:  Works in the office at the [employer]  Working presently: employed  Duties: moderate lifting    Functional Limitations/Social History:    Previous functional status includes: Independent with all ADLs.     Current FunctionalStatus   Home/Living environment : lives alone      Limitation of Functional Status as follows:   ADLs/IADLs:     - Feeding: independent except if have to carry something heavy during meal prep with both arms    - Bathing: much difficulty getting out of the tub     - Dressing/Grooming: independent     - Driving: independent       Sleeping: much difficulty due to pain.  Patient reports difficulty with pain throughout the day during all activities        Objective     Observation/Appearance: ***    Edema. Measured in centimeters.   3/15/2022 3/15/2022    Left Right   2in. Above elbow     2in. Below elbow     Wrist Crease     Figure of 8     MCPs       Edema. Measured in centimeters.   3/15/2022 3/15/2022    Left Right   Index:       P1      PIP     P2      DIP     P3     Long:       P1      PIP     P2      DIP     P3     Ring:       P1                 PIP                P2                  DIP     P3     Small:        P1                 PIP            P2             DIP     P3     Thumb:     Prox. Phalanx     IP     Distal Phalanx       Elbow and Wrist ROM. Measured in degrees.   3/15/2022 3/15/2022    Left Right   Elbow Ext/Flex     Supination/Pronation     Wrist Ext/Flex     Wrist RD/UD       Hand ROM. Measured in degrees.   3/15/2022 3/15/2022    Left Right        Index: MP                 PIP                    DIP                FISHER          Long:  MP                PIP                DIP                FISHER          Ring:   MP                PIP                DIP                FISHER          Small:  MP                 PIP                 DIP                FISHER          Thumb: MP                  IP         Rad ADD/ABD         Pal ADD/ABD            Opposition        Strength (Dynamometer) and Pinch Strength (Pinch  Gauge)  Measured in pounds.   3/15/2022 3/15/2022    Left Right   Rung II     [place] Pinch     3pt Pinch     2pt Pinch       Sensation   3/15/2022 3/15/2022    Left Right   [place]     Normal 1.65-2.83     Diminished Light Touch 3.22-3.61     Diminished Protective 3.84-4.31     Loss of Protective 4.56-6.65     Untestable >6.65     2 Point Discrimination     Static     Dynamic       Manual Muscle Test   3/15/2022 3/15/2022    Left Right   Wrist Extension      Wrist Flexion     Radial Deviation     Ulnar Deviation     Supination     Pronation     Elbow Extension     Elbow Flexion       Special Tests  Thumb CMC Grind Test    Finkelstein's Test    Phalen's Test    Tinel's Test    Benitez's Test    [place] Test    Digital Collateral Stress Test    ORL Test    Froment's Sign    Pinch OK Sign    Meagan's Sign     [place] Sign     Clamp Sign     SL Ballottement Test    LT Ballottement Test    Scaphoid/WatsonTest    Linscheid's Test    Metacarpal Stress Test    Piano Key Test    ECU Synergy Test    Ulnar Compression Test    TFCC Load Test    Ulnocarpal Stress Test    Midcarpal Shift Test    Pisiform Boost Test    Elbow Flexion Test    Scratch Collapse Test    Tennis Elbow Test    Resisted Middle Finger Extension Test    Mills Test    Chair Test    Biceps Squeeze Test    Biceps Hook Test    Milking Test    Press Up Manuever    PLRI Test    Valgus Stress Test    Varus Stress Test    Spurling Test    Cervical Distraction Test    ULNTT - General    ULNTT - Median Nerve    ULNTT - Radial Nerve    ULNTT - Ulnar Nerve         CMS Impairment/Limitation/Restriction for FOTO *** Survey    Therapist reviewed FOTO scores for Analia Apodaca on 3/15/2022.   FOTO documents entered into [employer] - see Media section.    Limitation Score: ***%         Treatment     Treatment Time In: ***  Treatment Time Out: ***  Total Treatment time separate from Evaluation time:***    Analia received the following supervised modalities after being  cleared for contradictions for *** minutes:   -***    Analia received the following direct contact modalities after being cleared for contraindications for *** minutes:  -***    Analia received the following manual therapy techniques for *** minutes:   -***    Analia received therapeutic exercises for *** minutes including:  -***    Analia participated in dynamic functional therapeutic activities to improve functional performance for ***  minutes, including:  -***    Home Exercise Program/Education:  Issued HEP (see patient instructions in EMR) and educated on modality use for pain management . Exercises were reviewed and Analia was able to demonstrate them prior to the end of the session.   Pt received a written copy of exercises to perform at home. Analia demonstrated {Desc; good/fair/poor:24049} understanding of the education provided.  Pt was advised to perform these exercises free of pain, and to stop performing them if pain occurs.    Patient/Family Education: role of OT, goals for OT, scheduling/cancellations - pt verbalized understanding. Discussed insurance limitations with patient.    Additional Education provided: ***    Assessment     Analia Apodaca is a 51 y.o. female referred to outpatient occupational therapy and presents with a medical diagnosis of ***, resulting in *** and demonstrates limitations as described in the chart below. Following medical record review it is determined that pt will benefit from occupational therapy services in order to maximize pain free and/or functional use of {LEFT/RIGHT:56415} ***. The following goals were discussed with the patient and patient is in agreement with them as to be addressed in the treatment plan. The patient's rehab potential is {REHAB PROGNOSIS OHS:73145}.     Anticipated barriers to occupational therapy: ***  Pt has no cultural, educational or language barriers to learning provided.    Profile and History Assessment of Occupational Performance  Level of Clinical Decision Making Complexity Score   Occupational Profile:   Analia Apodaca is a 51 y.o. female who {LIVES WITH:62912} and is currently {Work history:56678} as ***. Analia Apodaca has difficulty with  {ADLs:44936}  {IADLs:26491}  affecting his/her daily functional abilities. His/her main goal for therapy is ***.     Comorbidities:   {Co-morbidities:44177}    Medical and Therapy History Review:   {History Review:42938}               Performance Deficits    Physical:  {Physical:25585}    Cognitive:  {Cognitive:03512}    Psychosocial:    {Psychosocial:58432}     Clinical Decision Making:  {Desc; low/moderate/high:599055}    Assessment Process:  {Assessment Complexity:38672}    Modification/Need for Assistance:  {Modification:83033}    Intervention Selection:  {Treatment Options:09900}       {Desc; low/moderate/high:987337}  Based on PMHX, co morbidities , data from assessments and functional level of assistance required with task and clinical presentation directly impacting function.         Goals:   The following goals were discussed with the patient and patient is in agreement with them as to be addressed in the treatment plan.   Long Term Goals (LTGs); to be met by discharge.  LTG #1: Pt will report a pain level of *** out of 10 with ***   LTG #2: Pt will demo improved FOTO score by 20 points.   LTG #3: Pt will return to prior level of function for ADLs and household management.     Short Term Goals (STGs); to be met within 4 weeks (***).  STG #1a: Pt will report *** out of 10 pain level with ***.  STG #2a: Pt will report/demo {IP OT ASSISTANCE OPTIONS GEN OHS:017798408} with ***.  STG #2b: Pt will report/demo {IP OT ASSISTANCE OPTIONS GEN OHS:598485149} with ***.  STG #3a: Pt will demonstrate independence with issued HEP.   STG #3b: Pt will demo improved *** FISHER by *** degrees needed to aid with ***.        Plan   Certification Period/Plan of care expiration: 3/15/2022 to ***.    Outpatient  "Occupational Therapy {NUMBERS 1-5:39122} times weekly for {0-10:52409::"0"} weeks to include the following interventions: {AMB OT HAND TX:89904}.      SINTIA Sterling CHT          "

## 2022-03-16 NOTE — PROGRESS NOTES
Patient came to an occupational therapy evaluation today for an evaluation of left elbow pain. Upon screening and evaluation of patient's complaints it was found patient demonstrated no tenderness or pain with elbow palpation. No pain with wrist flexion or extension nor with hand grasping and reported no numbness elbow / forearm to hand. Patient demonstrated pain most often brought on with shoulder motion. Patient demonstrated left shoulder decreased range of motion and weakness with pain radiating from shoulder to elbow with motion.   Patient requested early morning appointment times and unfortunately occupational therapy does not currently have any early morning appointment times available. Due to it being thought patient's elbow pain may be originating from more proximal in nature and occupational therapy not being able to provide patient with the appointment times she needs, patient will be changed to physical therapy's schedule so she can be accommodated with the appointment times she needs and will be formally evaluated by physical therapy.

## 2022-03-25 ENCOUNTER — CLINICAL SUPPORT (OUTPATIENT)
Dept: REHABILITATION | Facility: HOSPITAL | Age: 52
End: 2022-03-25
Payer: COMMERCIAL

## 2022-03-25 DIAGNOSIS — M25.522 LEFT ELBOW PAIN: Primary | ICD-10-CM

## 2022-03-25 PROCEDURE — 97161 PT EVAL LOW COMPLEX 20 MIN: CPT | Mod: PN

## 2022-03-25 PROCEDURE — 97110 THERAPEUTIC EXERCISES: CPT | Mod: PN

## 2022-03-25 NOTE — PLAN OF CARE
[facility]   Physical Therapy Initial Evaluation     Date: 3/25/2022   Name: Analia Apodaca  Clinic Number: [id number]    Therapy Diagnosis:   Encounter Diagnosis   Name Primary?    Left elbow pain Yes     Physician: Clara Clark PA-C    Physician Orders: PT Eval and Treat  Medical Diagnosis from Referral: Left elbow pain   Evaluation Date: 3/25/2022  Authorization Period Expiration: 12/31/2022   Plan of Care Expiration: 5/25/20222  Progress Note Due: 4/25/2022  Visit # / Visits authorized: 1/ 1   FOTO: 1/3    Precautions: Standard     Time In: 9:30  Time Out: 10:15  Total Appointment Time (timed & untimed codes): 45 minutes      SUBJECTIVE     Date of onset: 1/1/2022    History of current condition - Analia reports: L shoulder and elbow pain (anterior and lateral aspect) of insidious onset. Denies numbness/tingling, neck pain, symptoms distal to elbow. Notes fatigue after ~2 minutes holding object in her L requiring her to rest. Denies history of neck pain or injury. At least 3 years ago, patient reports having R shoulder pain with no elbow pain. She isn't sure whether it was adhesive capsulitis or bursitis. She had physical therapy at the time and it resolved. Currently she unable to raise arm up, reach to side, sleep on L. Ibuprofen is no longer alleviating her pain. Ice/heat does not help.    Falls: no    Imaging:   Xray Left Elbow 3/8/22:  No evidence of fracture. 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   Prior Level of Function: independent   Current Level of Function: modified independent    Pain:  Current 5/10, worst 10/10, best 1/10   Location: anterolateral shoulder to anterior elbow  Description: Burning and Sharp  Aggravating Factors: Night Time (regardless of activity levels during the day) and moving the arm  Easing Factors: rest but this does not  "abolish symptoms     Patients goals: to stop the pain and be able to use her arm without any issues     Medical History:   Past Medical History:   Diagnosis Date    Abnormal Pap smear of cervix 30 y/o    no treatment    Depression     GERD (gastroesophageal reflux disease)     Hypertension        Surgical History:   Analia Apodaca  has a past surgical history that includes Tonsillectomy; Excision of nodule; and Colonoscopy (N/A, 3/16/2020).    Medications:   Analia has a current medication list which includes the following prescription(s): contrave, famotidine, ibuprofen, irbesartan, and omeprazole.    Allergies:   Review of patient's allergies indicates:  No Known Allergies       OBJECTIVE     Observation  - humeral head positioned anteriorly    Cervical Spine Range of Motion (ROM)  Cervical Spine  AROM   Initial Eval Pain   Flexion   50 n and none with overpressure   Extension   46 n and none with overpressure   Rotation R   60 n and none with overpressure   Rotation L   50 n and none with overpressure   Sidebend R   30 n and none with overpressure   Sidebend L   26 n and none with overpressure   Seated thoracic rotation     All fields left blank intentionally and signify "not tested"    Upper Extremity Range of Motion (ROM)  Right  Initial Eval  AROM Pain  A/PROM UE ROM Left  Initial Eval  A/PROM Pain  A/PROM   145 n Shoulder flexion 134/ passively hard end feel 100 deg superior aspect shoulder y mid to end range   165 n Shoulder abduction     Functional ER: T2 n Shoulder external rotation Functional ER: T1 y less than internal rotation    Functional IR: T8 n Shoulder internal rotation Functional IR: L5 y during motion     Elbow flexion       Elbow extension       Wrist flexion       Wrist extension     All fields left blank intentionally and signify "not tested"    Upper Extremity Strength MMT  /5  Right  Initial Eval Pain  Left  Initial Eval Pain   5 n Shoulder flexion 5- n   5 n Shoulder abduction 5- n " "  5 n Elbow flexion 5- n   5 n Elbow extension 5- n   5 n Shoulder external rotation 5- n   5 n Shoulder internal rotation 5- n     Wrist flexion       Wrist extension     All fields left blank intentionally and signify "not tested"    Neurofascial dynamics evaluation  - ULNTT1 (median): (+) arm adducted    Special Tests:    - (-) empty can  - (-) full can  - (-) lift off  - (+) Hawkin's  - (+) Neer's  - (+) cross body adduction     Joint Mobility: hypomobile posterior, inferior GHJ; hypomobile ACJ gapping; hypomobile inferior first rib     Palpation: soft tissue restrictions/tender to palpation pec abilio/min, posterior shoulder, upper trap, levator scap     Sensation: intact to light touch    Edema: none     Limitation/Restriction for FOTO Elbow Survey    Therapist reviewed FOTO scores for Analia Apodaca on 3/25/2022.   FOTO documents entered into [employer] - see Media section.    Limitation Score: 63%         TREATMENT     Total Treatment time (time-based codes) separate from Evaluation: 8 minutes      Analia received the treatments listed below:      therapeutic exercises to develop strength, endurance, ROM, flexibility, posture and core stabilization for 8 minutes including:  First rib mobilization with strap  Self-soft tissue mobilization with tennis ball to pec    PATIENT EDUCATION AND HOME EXERCISES     Education provided:   - Current status, plan of care, goals of physical therapy, home exercise program, role of the physical therapist, interdependence of joint structures: ribcage, thoracic spine, shoulder girdle, GHJ    Written Home Exercises Provided: yes. Exercises were reviewed and Analia was able to demonstrate them prior to the end of the session.  Analia demonstrated good  understanding of the education provided. See EMR under Patient Instructions for exercises provided during therapy sessions.    ASSESSMENT     Analia is a 51 y.o. female referred to outpatient Physical Therapy with a medical diagnosis " of left elbow pain. Patient presents with joint hypomobility of ACJ, GHJ, ribcage and thoracic spine, (+) impingement testing and cross body adduction test, (+) medial nerve neural tension all impacting patient's ability to reach above shoulder height and behind her back from activities of daily living     Patient prognosis is Good.   Patientt will benefit from skilled outpatient Physical Therapy to address the deficits stated above and in the chart below, provide patient /family education, and to maximize patientt's level of independence.     Plan of care discussed with patient: Yes  Patient's spiritual, cultural and educational needs considered and patient is agreeable to the plan of care and goals as stated below:     Anticipated Barriers for therapy: none    Medical Necessity is demonstrated by the following  History  Co-morbidities and personal factors that may impact the plan of care Co-morbidities:   depression and HTN    Personal Factors:   no deficits     low   Examination  Body Structures and Functions, activity limitations and participation restrictions that may impact the plan of care Body Regions:   upper extremities  trunk    Body Systems:    gross symmetry  ROM  strength  motor control    Participation Restrictions:   Limited ability to perform activities of daily living    Activity limitations:   Learning and applying knowledge  no deficits    General Tasks and Commands  no deficits    Communication  no deficits    Mobility  lifting and carrying objects    Self care  washing oneself (bathing, drying, washing hands)  dressing    Domestic Life  shopping  cooking  doing house work (cleaning house, washing dishes, laundry)    Interactions/Relationships  no deficits    Life Areas  no deficits    Community and Social Life  no deficits         low   Clinical Presentation stable and uncomplicated low   Decision Making/ Complexity Score: low     Goals:  Short Term Goals: 4 weeks   1. Patient will demonstrate  independence in prescribed home exercise program once a day with proper form, sets and reps to improve initiation, strength and endurance of muscles needed to return to prior level of function  2. Patient will demonstrate shoulder flexion active range of motion greater or equal to 150 degrees to be able to reach into overhead cabinets and dress him/herself for painfree activities of daily living  3. Patient will achieve upper extremity strength 5/5 to be able to lift 10 pound grocery bag painfree     Long Term Goals: 8 weeks   1. Patient will achieve shoulder functional external rotation to T2 to allow ease with washing hair and neck  2. Patient will achieve functional internal rotation to T8 to allow ease with washing back  3. Patient will demonstrate cross body adduction with L UE to posterior aspect R shoulder to allow patient to pull up bra straps without difficulty.    PLAN     Plan of care Certification: 3/25/2022 to 5/25/2022.    Outpatient Physical Therapy 2 times weekly for 8 weeks to include the following interventions: Cervical/Lumbar Traction, Electrical Stimulation  , Manual Therapy, Moist Heat/ Ice, Neuromuscular Re-ed, Patient Education, Self Care, Therapeutic Activities, Therapeutic Exercise and Ultrasound. Interventions can be provided by PTA as a treatment team.    Lynn Veras, PT      I CERTIFY THE NEED FOR THESE SERVICES FURNISHED UNDER THIS PLAN OF TREATMENT AND WHILE UNDER MY CARE   Physician's comments:     Physician's Signature: ___________________________________________________

## 2022-03-31 ENCOUNTER — CLINICAL SUPPORT (OUTPATIENT)
Dept: REHABILITATION | Facility: HOSPITAL | Age: 52
End: 2022-03-31
Payer: COMMERCIAL

## 2022-03-31 DIAGNOSIS — M25.522 LEFT ELBOW PAIN: Primary | ICD-10-CM

## 2022-03-31 PROCEDURE — 97140 MANUAL THERAPY 1/> REGIONS: CPT | Mod: PN,CQ

## 2022-03-31 PROCEDURE — 97110 THERAPEUTIC EXERCISES: CPT | Mod: PN,CQ

## 2022-03-31 NOTE — PROGRESS NOTES
"  Scapular retraction    Scapular retraction.     Exercise: bring elbows back and squeeze shoulder blades together.     Mechanics: make sure to "hike" shoulders up. Keep elbows close to side.     SHOULDER ROLLS    Move your shoulders in a circular pattern as shown so that your are moving in an up, back and down direction. Perform small circles if needed for comfort.    Median Nerve Glide     Lie on your back with shoulder raised out towards side about 80 or 90 degrees, elbow bent, and wrist extended. Keep shoulder and wrist in same position and slowly straighten your elbow until you feel a gentle stretch. Then return to starting position.     Median Nerve Glide 3    Start by holding your arm out straight, wrist bent, and fingers down. Gently bend your neck to the opposite direction and return to the starting position.    Median Nerve Glide 1    Start with your arms by your side. With wrists bent and fingers pointing away from your body, gently depress one shoulder at a time until a light stretch is felt (anywhere between the neck and the fingers). You may do one side at a time or alternate arms.       [facility]  Physical Therapy Daily Note      Name: Analia Apodaca  [facility] Number: [id number]    Therapy Diagnosis:   Encounter Diagnosis   Name Primary?    Left elbow pain Yes     Physician: Clara Clark PA-C    Visit Date: 3/31/2022    Physician Orders: PT Eval and Treat  Medical Diagnosis from Referral: Left elbow pain   Evaluation Date: 3/25/2022  Authorization Period Expiration: 12/31/2022   Plan of Care Expiration: 5/25/20222  Progress Note Due: 4/25/2022  Visit # / Visits authorized: 1/ 1   FOTO: 1/3     Precautions: Standard     Visit #: 1 of 20  PTA Visit #: 1  Time In: 1345  Time Out: 1425  Total Billable Time: 40 minutes    Subjective     Pt reports: she has been having the shoulder/elbow pain for months now. Patient gets a shooting pain to left elbow mainly at lateral and " posterior of humerus.   She was compliant with home exercise program.  Response to previous treatment: first treatment day  Functional change: shoulder and mid humerus pain    Pain: 6/10  Location: left shoulder and mid-humerus       Objective     Analia received therapeutic exercises to develop strength, endurance, ROM, flexibility and posture for 25 minutes including:  Shoulder reverse rolls x 10  Scapular retraction x 10  Pendulum 20x each way  Physio ball flexion on mat x 10 (thumbs face towards ceiling)  Isometric shoulder flex, IR, and ER  Median nerve glide decompression x 10  Median nerve glide with elbow and wrist flex in standing (Can do this in supine as well) x 10    Analia received the following manual therapy techniques: Joint mobilizations and Soft tissue Mobilization were applied to the: Left shoulder for 15 minutes, including:  PROM stretch in flexion and abduction   Shoulder distraction to improve shoulder ROM    Home Exercises Provided and Patient Education Provided     Written Home Exercises Provided: yes.  Exercises were reviewed and Analia was able to demonstrate them prior to the end of the session.  Analia demonstrated good  understanding of the education provided.     Education provided:   -Sleeping position using a pillow for support   -neural flossing or glide to address radiating pain or discomforts to elbow    Assessment   Patient arrived to therapy clinic with a medical diagnois of left elbow pain. Patient displayed stiffness in shoulder joint region (ACJ and GHJ) and radiating pain to elbow. Provided shoulder distraction and passive stretch into pain-free range which patient responded fairly. Focused session on improving shoulder mobility and neural flossing to improve centralization of pain. Patient tolerated fairly well with all therapeutic exercises. No reports of increase pain after therapy session. I educated patient on sleeping position to reduce pressure to left shoulder  using a pillow for support. Overall, will continue to progress as tolerated.   Analia Is progressing slowly towards her goals.   Pt prognosis is Good.     Pt will continue to benefit from skilled outpatient physical therapy to address the deficits listed in the problem list box on initial evaluation, provide pt/family education and to maximize pt's level of independence in the home and community environment.     Pt's spiritual, cultural and educational needs considered and pt agreeable to plan of care and goals.     Anticipated barriers to physical therapy: None     Goals:   Short Term Goals: 4 weeks   1. Patient will demonstrate independence in prescribed home exercise program once a day with proper form, sets and reps to improve initiation, strength and endurance of muscles needed to return to prior level of function  2. Patient will demonstrate shoulder flexion active range of motion greater or equal to 150 degrees to be able to reach into overhead cabinets and dress him/herself for painfree activities of daily living  3. Patient will achieve upper extremity strength 5/5 to be able to lift 10 pound grocery bag painfree      Long Term Goals: 8 weeks   1. Patient will achieve shoulder functional external rotation to T2 to allow ease with washing hair and neck  2. Patient will achieve functional internal rotation to T8 to allow ease with washing back  3. Patient will demonstrate cross body adduction with L UE to posterior aspect R shoulder to allow patient to pull up bra straps without difficulty.  Plan   Plan of care Certification: 3/25/2022 to 5/25/2022.     Outpatient Physical Therapy 2 times weekly for 8 weeks to include the following interventions: Cervical/Lumbar Traction, Electrical Stimulation  , Manual Therapy, Moist Heat/ Ice, Neuromuscular Re-ed, Patient Education, Self Care, Therapeutic Activities, Therapeutic Exercise and Ultrasound. Interventions can be provided by PTA as a treatment team.    Continue  with ANIL Hamilton Mai, PTA   3/31/2022

## 2022-04-04 RX ORDER — NALTREXONE HYDROCHLORIDE AND BUPROPION HYDROCHLORIDE 8; 90 MG/1; MG/1
TABLET, EXTENDED RELEASE ORAL
Qty: 120 TABLET | Refills: 1 | Status: SHIPPED | OUTPATIENT
Start: 2022-04-04 | End: 2022-05-31

## 2022-04-05 ENCOUNTER — CLINICAL SUPPORT (OUTPATIENT)
Dept: REHABILITATION | Facility: HOSPITAL | Age: 52
End: 2022-04-05
Payer: COMMERCIAL

## 2022-04-05 DIAGNOSIS — M25.522 LEFT ELBOW PAIN: Primary | ICD-10-CM

## 2022-04-05 PROCEDURE — 97110 THERAPEUTIC EXERCISES: CPT | Mod: PN,CQ

## 2022-04-05 PROCEDURE — 97140 MANUAL THERAPY 1/> REGIONS: CPT | Mod: PN,CQ

## 2022-04-05 NOTE — PROGRESS NOTES
"  [facility]  Physical Therapy Daily Note      Name: Analia Apodaca  Clinic Number: [id number]    Therapy Diagnosis:   Encounter Diagnosis   Name Primary?    Left elbow pain Yes     Physician: Clara Clark PA-C    Visit Date: 4/5/2022    Physician Orders: PT Eval and Treat  Medical Diagnosis from Referral: Left elbow pain   Evaluation Date: 3/25/2022  Authorization Period Expiration: 12/31/2022   Plan of Care Expiration: 5/25/20222  Progress Note Due: 4/25/2022  Visit # / Visits authorized: 2/ 20   FOTO: 1/3     Precautions: Standard     Visit #: 2 of 20  PTA Visit #: 2  Time In: 4:41PM  Time Out: 5:20PM  Total Billable Time: 35 minutes    Subjective     Pt reports: "It still hurts", referring to left posterior arm.   She was compliant with home exercise program.  Response to previous treatment: still has pain to posterior arm   Functional change: shoulder and mid humerus pain    Pain: 6-7/10  Location: left shoulder and mid-humerus       Objective     Analia received therapeutic exercises to develop strength, endurance, ROM, flexibility and posture for 20 minutes including:  Shoulder reverse rolls x 10  Scapular retraction x 10  Pendulum 20x each way   Physio ball flexion on mat x 10 (thumbs face towards ceiling)  Isometric shoulder flex, IR, and ER x 10 each way   Median nerve glide decompression x 10  Median nerve glide with elbow and wrist flex in standing (Can do this in supine as well) x 10  Pectoralis stretch (corner stretch) x 10  Open book thoracic stretch on red mat x 10  Pulleys in Left shoulder IR x 10  Left upper trap stretch x 5    Analia received the following manual therapy techniques: Joint mobilizations and Soft tissue Mobilization were applied to the: Left shoulder for 15 minutes, including:  PROM stretch in flexion and abduction   Shoulder distraction to improve shoulder ROM   Shoulder glenohumeral posterior glide in short axis grade 2 to improve ROM    Home " Exercises Provided and Patient Education Provided     Written Home Exercises Provided: yes.  Exercises were reviewed and Analia was able to demonstrate them prior to the end of the session.  Analia demonstrated good  understanding of the education provided.     Education provided:   -Sleeping position using a pillow for support   -neural flossing or glide to address radiating pain or discomforts to elbow    Assessment   Patient continues to demonstrates pain to left shoulder mainly to posterior humerus. Continue with manual therapy to improve shoulder range of motion and to decrease stiffness in glenohumeral joint region. Patient responded well with manual therapy. Noted patient shows discomforts in isometric towel exercises today; therefore defer this exercises to prevent further pain. Will continue to work on AC joint and GH joint mobility, neural pain, and shoulder strengthening.   Analia Is progressing slowly towards her goals.   Pt prognosis is Good.     Pt will continue to benefit from skilled outpatient physical therapy to address the deficits listed in the problem list box on initial evaluation, provide pt/family education and to maximize pt's level of independence in the home and community environment.     Pt's spiritual, cultural and educational needs considered and pt agreeable to plan of care and goals.     Anticipated barriers to physical therapy: None     Goals:   Short Term Goals: 4 weeks   1. Patient will demonstrate independence in prescribed home exercise program once a day with proper form, sets and reps to improve initiation, strength and endurance of muscles needed to return to prior level of function  2. Patient will demonstrate shoulder flexion active range of motion greater or equal to 150 degrees to be able to reach into overhead cabinets and dress him/herself for painfree activities of daily living  3. Patient will achieve upper extremity strength 5/5 to be able to lift 10 pound grocery  bag painfree      Long Term Goals: 8 weeks   1. Patient will achieve shoulder functional external rotation to T2 to allow ease with washing hair and neck  2. Patient will achieve functional internal rotation to T8 to allow ease with washing back  3. Patient will demonstrate cross body adduction with L UE to posterior aspect R shoulder to allow patient to pull up bra straps without difficulty.  Plan   Plan of care Certification: 3/25/2022 to 5/25/2022.     Outpatient Physical Therapy 2 times weekly for 8 weeks to include the following interventions: Cervical/Lumbar Traction, Electrical Stimulation  , Manual Therapy, Moist Heat/ Ice, Neuromuscular Re-ed, Patient Education, Self Care, Therapeutic Activities, Therapeutic Exercise and Ultrasound. Interventions can be provided by PTA as a treatment team.    Continue with POC.     Joy Mata PTA   4/5/2022

## 2022-04-05 NOTE — PATIENT INSTRUCTIONS
OPEN BOOK THORACIC STRETCH      PECTORALIS CORNER STRETCH      SHOULDER INTERNAL ROTATION STRETCH

## 2022-04-07 ENCOUNTER — CLINICAL SUPPORT (OUTPATIENT)
Dept: REHABILITATION | Facility: HOSPITAL | Age: 52
End: 2022-04-07
Payer: COMMERCIAL

## 2022-04-07 DIAGNOSIS — Z12.31 OTHER SCREENING MAMMOGRAM: ICD-10-CM

## 2022-04-07 DIAGNOSIS — M25.522 LEFT ELBOW PAIN: Primary | ICD-10-CM

## 2022-04-07 PROCEDURE — 97140 MANUAL THERAPY 1/> REGIONS: CPT | Mod: PN,CQ

## 2022-04-07 PROCEDURE — 97110 THERAPEUTIC EXERCISES: CPT | Mod: PN,CQ

## 2022-04-07 NOTE — PROGRESS NOTES
[facility]  Physical Therapy Daily Note      Name: Analia Apodaca  Clinic Number: [id number]    Therapy Diagnosis:   Encounter Diagnosis   Name Primary?    Left elbow pain Yes     Physician: Clara Clark PA-C    Visit Date: 4/7/2022    Physician Orders: PT Eval and Treat  Medical Diagnosis from Referral: Left elbow pain   Evaluation Date: 3/25/2022  Authorization Period Expiration: 12/31/2022   Plan of Care Expiration: 5/25/20222  Progress Note Due: 4/25/2022  Visit # / Visits authorized: 3/ 20   FOTO: 1/3     Precautions: Standard     Visit #: 3 of 20  PTA Visit #: 3  Time In: 800  Time Out: 840  Total Billable Time: 40 minutes    Subjective     Pt reports: she still has the same usual pain to posterior left arm.   She was compliant with home exercise program.  Response to previous treatment: first treatment day  Functional change: shoulder and mid humerus pain    Pain: 6-7/10  Location: left shoulder and mid-humerus       Objective     Analia received therapeutic exercises to develop strength, endurance, ROM, flexibility and posture for 15 minutes including:  Shoulder reverse rolls x 10  Scapular retraction x 10  Pendulum 20x each way   Physio ball flexion on mat x 10 (thumbs face towards ceiling)  Isometric shoulder flex, IR, and ER x 10 each way   Median nerve glide decompression x 10  Median nerve glide with elbow and wrist flex in standing (Can do this in supine as well) x 10   Pectoralis stretch (corner stretch) x 10  Open book thoracic stretch on red mat x 10  Pulleys in Left shoulder IR x 10  Left upper trap stretch x 5x, 10 sec holds     Analia received the following manual therapy techniques: Joint mobilizations and Soft tissue Mobilization were applied to the: Left shoulder for 25 minutes, including:  PROM stretch in flexion and abduction   Shoulder distraction to improve shoulder ROM   Shoulder glenohumeral posterior glide in short axis grade 2 to improve ROM  AC joint  mobilization grade 2 in sitting position     Home Exercises Provided and Patient Education Provided     Written Home Exercises Provided: yes.  Exercises were reviewed and Analia was able to demonstrate them prior to the end of the session.  Analia demonstrated good  understanding of the education provided.     Education provided:   -Sleeping position using a pillow for support   -neural flossing or glide to address radiating pain or discomforts to elbow    Assessment   Patient continues to displayed pain to left posterior arm. Continue with thoracic and shoulder mobility to improve flexibility as well as pain reduction to shoulder. Patient has no to minimal pain to shoulder with most exercises today. Will address postural awareness next visit and continue with flexibility and strengthening exercises.   Analia Is progressing slowly towards her goals.   Pt prognosis is Good.     Pt will continue to benefit from skilled outpatient physical therapy to address the deficits listed in the problem list box on initial evaluation, provide pt/family education and to maximize pt's level of independence in the home and community environment.     Pt's spiritual, cultural and educational needs considered and pt agreeable to plan of care and goals.     Anticipated barriers to physical therapy: None     Goals:   Short Term Goals: 4 weeks   1. Patient will demonstrate independence in prescribed home exercise program once a day with proper form, sets and reps to improve initiation, strength and endurance of muscles needed to return to prior level of function  2. Patient will demonstrate shoulder flexion active range of motion greater or equal to 150 degrees to be able to reach into overhead cabinets and dress him/herself for painfree activities of daily living  3. Patient will achieve upper extremity strength 5/5 to be able to lift 10 pound grocery bag painfree      Long Term Goals: 8 weeks   1. Patient will achieve shoulder  functional external rotation to T2 to allow ease with washing hair and neck  2. Patient will achieve functional internal rotation to T8 to allow ease with washing back  3. Patient will demonstrate cross body adduction with L UE to posterior aspect R shoulder to allow patient to pull up bra straps without difficulty.  Plan   Plan of care Certification: 3/25/2022 to 5/25/2022.     Outpatient Physical Therapy 2 times weekly for 8 weeks to include the following interventions: Cervical/Lumbar Traction, Electrical Stimulation  , Manual Therapy, Moist Heat/ Ice, Neuromuscular Re-ed, Patient Education, Self Care, Therapeutic Activities, Therapeutic Exercise and Ultrasound. Interventions can be provided by PTA as a treatment team.    Continue with POC.     Joy Mata PTA   4/7/2022

## 2022-04-14 ENCOUNTER — CLINICAL SUPPORT (OUTPATIENT)
Dept: REHABILITATION | Facility: HOSPITAL | Age: 52
End: 2022-04-14
Payer: COMMERCIAL

## 2022-04-14 DIAGNOSIS — M25.522 LEFT ELBOW PAIN: Primary | ICD-10-CM

## 2022-04-14 PROCEDURE — 97140 MANUAL THERAPY 1/> REGIONS: CPT | Mod: PN

## 2022-04-14 PROCEDURE — 97110 THERAPEUTIC EXERCISES: CPT | Mod: PN

## 2022-04-14 NOTE — PROGRESS NOTES
[facility]  Physical Therapy Daily Note      Name: Analia Apodaca  Clinic Number: [id number]    Therapy Diagnosis:   Encounter Diagnosis   Name Primary?    Left elbow pain Yes     Physician: Clara Clark PA-C    Visit Date: 4/14/2022    Physician Orders: PT Eval and Treat  Medical Diagnosis from Referral: Left elbow pain   Evaluation Date: 3/25/2022  Authorization Period Expiration: 12/31/2022   Plan of Care Expiration: 5/25/20222  Progress Note Due: 4/25/2022  Visit # / Visits authorized: 4/ 20   FOTO: 2/3     Precautions: Standard     Visit #: 4 of 20  PTA Visit #: 3  Time In: 8:00 AM  Time Out: 8:42 AM  Total Billable Time: 42 minutes    Subjective     Pt reports: she experiences continued L intermittent shoulder pain, especially in the morning. She reports she has no pain when resting, however with increased movement, her pain levels increase.  She was compliant with home exercise program.  Response to previous treatment: nothing to report.   Functional change: continued intermittent pain of shoulder and mid humerus    Pain: 8/10  Location: left shoulder and mid-humerus       Objective     Analia received therapeutic exercises to develop strength, endurance, ROM, flexibility and posture for 17 minutes including:  Shoulder reverse rolls x 20  Shoulder forward rolls x 20   Scapular retraction x 10  Pendulum 20x each way   Physio ball flexion on mat x 10 (thumbs face towards ceiling)  Isometric shoulder flex, IR, and ER x 10 each way   Median nerve glide decompression x 10  Median nerve glide with elbow and wrist flex in standing (Can do this in supine as well) x 10   Pectoralis stretch (corner stretch) x 10  Open book thoracic stretch on mat x 10  Pulleys in Left shoulder IR x 10  Left upper trap stretch x 5x, 10 sec holds   AAROM of B) shoulder flexion with cane, x10  AROM of standing Is, Ys, Ts up to 90 degrees, x10  Sleeper's stretch on L shoulder, x10    Analia received the  following manual therapy techniques: Joint mobilizations and Soft tissue Mobilization were applied to the: Left shoulder for 25 minutes, including:  PROM stretch in flexion, abduction, horizontal adduction  Shoulder distraction to improve shoulder ROM   Shoulder glenohumeral posterior glide in short axis grade 2 to improve ROM  AC joint mobilization grade 2 in sitting position     Home Exercises Provided and Patient Education Provided     Written Home Exercises Provided: yes.  Exercises were reviewed and Analia was able to demonstrate them prior to the end of the session.  Analia demonstrated good  understanding of the education provided.     Education provided:   - HEP and importance of compliance, Role of PT, Posterior capsule stretches to provide relief of L shoulder pain.     Assessment     Analia tolerated today's therapeutic session today with intermittent rest breaks given throughout session. She continues to demonstrate increased L shoulder pain, specifically end-range flexion and mid-range extension. Introduced AROM of bilateral traps to facilitate postural awareness and strengthening of mid back. Patient tolerated today's exercises with rest breaks, however demonstrated increased muscular fatigue on later repetitions throughout session. Completed PROM of L shoulder horizontal abduction and sleeper's stretch exercise to improve general shoulder arthrokinematics, specifically posterior capsule. Plan to continue with flexibility and strengthening exercises per POC and patient's tolerance next visit.     Analia Is progressing slowly towards her goals.   Pt prognosis is Good.     Pt will continue to benefit from skilled outpatient physical therapy to address the deficits listed in the problem list box on initial evaluation, provide pt/family education and to maximize pt's level of independence in the home and community environment.     Pt's spiritual, cultural and educational needs considered and pt  agreeable to plan of care and goals.     Anticipated barriers to physical therapy: None     Goals:   Short Term Goals: 4 weeks   1. Patient will demonstrate independence in prescribed home exercise program once a day with proper form, sets and reps to improve initiation, strength and endurance of muscles needed to return to prior level of function  2. Patient will demonstrate shoulder flexion active range of motion greater or equal to 150 degrees to be able to reach into overhead cabinets and dress him/herself for painfree activities of daily living  3. Patient will achieve upper extremity strength 5/5 to be able to lift 10 pound grocery bag painfree      Long Term Goals: 8 weeks   1. Patient will achieve shoulder functional external rotation to T2 to allow ease with washing hair and neck  2. Patient will achieve functional internal rotation to T8 to allow ease with washing back  3. Patient will demonstrate cross body adduction with L UE to posterior aspect R shoulder to allow patient to pull up bra straps without difficulty.  Plan   Plan of care Certification: 3/25/2022 to 5/25/2022.     Outpatient Physical Therapy 2 times weekly for 8 weeks to include the following interventions: Cervical/Lumbar Traction, Electrical Stimulation  , Manual Therapy, Moist Heat/ Ice, Neuromuscular Re-ed, Patient Education, Self Care, Therapeutic Activities, Therapeutic Exercise and Ultrasound. Interventions can be provided by PTA as a treatment team.    Continue with POC.     Loly Forte, PT   4/14/2022

## 2022-04-21 ENCOUNTER — CLINICAL SUPPORT (OUTPATIENT)
Dept: REHABILITATION | Facility: HOSPITAL | Age: 52
End: 2022-04-21
Payer: COMMERCIAL

## 2022-04-21 DIAGNOSIS — M25.522 LEFT ELBOW PAIN: Primary | ICD-10-CM

## 2022-04-21 PROCEDURE — 97140 MANUAL THERAPY 1/> REGIONS: CPT | Mod: PN,CQ

## 2022-04-21 PROCEDURE — 97110 THERAPEUTIC EXERCISES: CPT | Mod: PN,CQ

## 2022-04-21 NOTE — PROGRESS NOTES
[facility]  Physical Therapy Daily Note      Name: Analia Apodaca  Clinic Number: [id number]    Therapy Diagnosis:   Encounter Diagnosis   Name Primary?    Left elbow pain Yes     Physician: Clara Clark PA-C    Visit Date: 4/21/2022    Physician Orders: PT Eval and Treat  Medical Diagnosis from Referral: Left elbow pain   Evaluation Date: 3/25/2022  Authorization Period Expiration: 12/31/2022   Plan of Care Expiration: 5/25/20222  Progress Note Due: 4/25/2022  Visit # / Visits authorized: 5/ 20   FOTO: 2/3     Precautions: Standard     Visit #: 5 of 20  PTA Visit #: 1  Time In: 1600  Time Out: 1640  Total Billable Time: 40 minutes    Subjective     Pt reports: her Left shoulder pain seems to have subsided a little since last visit with PT. Patient was able to work long hours without major pain to shoulder.   She was compliant with home exercise program.  Response to previous treatment: felt better after last session.   Functional change: continued intermittent pain of shoulder and mid humerus    Pain: 0/10  Location: left shoulder and mid-humerus       Objective     Analia received therapeutic exercises to develop strength, endurance, ROM, flexibility and posture for 23 minutes including:  Shoulder reverse rolls x 20  Shoulder forward rolls x 20   Scapular retraction x 10   Pendulum 20x each way   Physio ball flexion on mat x 10 (thumbs face towards ceiling)  Isometric shoulder flex, IR, and ER x 10 each way   Median nerve glide decompression x 10  Median nerve glide with elbow and wrist flex in standing (Can do this in supine as well) x 10   Pectoralis stretch (corner stretch) x 10  Open book thoracic stretch on mat x 10  Pulleys in Left shoulder IR x 10  Left upper trap stretch x 5x, 10 sec holds   AAROM of B) shoulder flexion with cane, x10  AROM of standing Is, Ys, Ts up to 90 degrees, x5 with 1lb to left wrap around wrist   Sleeper's stretch on L shoulder, x10    Analia  received the following manual therapy techniques: Joint mobilizations and Soft tissue Mobilization were applied to the: Left shoulder for 17 minutes, including:  PROM stretch in flexion, abduction, horizontal adduction  Shoulder distraction to improve shoulder ROM   Shoulder glenohumeral posterior glide in short axis grade 2 to improve ROM  AC joint mobilization grade 2 in sitting position     Home Exercises Provided and Patient Education Provided     Written Home Exercises Provided: yes.  Exercises were reviewed and Analia was able to demonstrate them prior to the end of the session.  Analia demonstrated good  understanding of the education provided.     Education provided:   - HEP and importance of compliance, Role of PT, Posterior capsule stretches to provide relief of L shoulder pain.     Assessment     Patient tolerated today's therapy session well. Noted patient demonstrate less to no pain to left shoulder this visit. Patient was able to perform AROM of I, Y, T with 1lb weight to left wrist for strengthening with no complaints of pain. At this time, patient appeared to be showing slow improvement in pain, flexibility, and strengthening to Left shoulder. Will continue to progress as tolerated.     Analia Is progressing slowly towards her goals.   Pt prognosis is Good.     Pt will continue to benefit from skilled outpatient physical therapy to address the deficits listed in the problem list box on initial evaluation, provide pt/family education and to maximize pt's level of independence in the home and community environment.     Pt's spiritual, cultural and educational needs considered and pt agreeable to plan of care and goals.     Anticipated barriers to physical therapy: None     Goals:   Short Term Goals: 4 weeks   1. Patient will demonstrate independence in prescribed home exercise program once a day with proper form, sets and reps to improve initiation, strength and endurance of muscles needed to return  to prior level of function  2. Patient will demonstrate shoulder flexion active range of motion greater or equal to 150 degrees to be able to reach into overhead cabinets and dress him/herself for painfree activities of daily living  3. Patient will achieve upper extremity strength 5/5 to be able to lift 10 pound grocery bag painfree      Long Term Goals: 8 weeks   1. Patient will achieve shoulder functional external rotation to T2 to allow ease with washing hair and neck  2. Patient will achieve functional internal rotation to T8 to allow ease with washing back  3. Patient will demonstrate cross body adduction with L UE to posterior aspect R shoulder to allow patient to pull up bra straps without difficulty.  Plan   Plan of care Certification: 3/25/2022 to 5/25/2022.     Outpatient Physical Therapy 2 times weekly for 8 weeks to include the following interventions: Cervical/Lumbar Traction, Electrical Stimulation  , Manual Therapy, Moist Heat/ Ice, Neuromuscular Re-ed, Patient Education, Self Care, Therapeutic Activities, Therapeutic Exercise and Ultrasound. Interventions can be provided by PTA as a treatment team.    Continue with POC.     Joy Mata PTA   4/21/2022

## 2022-04-24 DIAGNOSIS — I10 BENIGN ESSENTIAL HTN: ICD-10-CM

## 2022-04-25 ENCOUNTER — CLINICAL SUPPORT (OUTPATIENT)
Dept: REHABILITATION | Facility: HOSPITAL | Age: 52
End: 2022-04-25
Payer: COMMERCIAL

## 2022-04-25 DIAGNOSIS — M25.522 LEFT ELBOW PAIN: Primary | ICD-10-CM

## 2022-04-25 PROCEDURE — 97110 THERAPEUTIC EXERCISES: CPT | Mod: PN,CQ

## 2022-04-25 PROCEDURE — 97140 MANUAL THERAPY 1/> REGIONS: CPT | Mod: PN,CQ

## 2022-04-25 RX ORDER — IRBESARTAN 150 MG/1
TABLET ORAL
Qty: 90 TABLET | Refills: 1 | Status: SHIPPED | OUTPATIENT
Start: 2022-04-25

## 2022-04-25 NOTE — PROGRESS NOTES
" [facility]  Physical Therapy Reassessment and Daily Note     Name: Analia Apodaca  Clinic Number: [id number]    Therapy Diagnosis:   Encounter Diagnosis   Name Primary?    Left elbow pain Yes     Physician: Clara Clark PA-C    Visit Date: 4/25/2022    Physician Orders: PT Eval and Treat  Medical Diagnosis from Referral: Left elbow pain   Evaluation Date: 3/25/2022  Authorization Period Expiration: 12/31/2022   Plan of Care Expiration: 5/25/20222  Progress Note Due: 4/25/2022  Visit # / Visits authorized: 6/ 20   FOTO: 2/3     Precautions: Standard     Visit #: 6 of 20  PTA Visit #: 2  Time In: 1600  Time Out: 1645  Total Billable Time: 40 minutes     Subjective     Pt reports: she could not sleeps lately of her left shoulder pain and kept her up. Patient does sleeps on the Left side with pillow, but it seems to irritate her shoulder more with increase pain.   She was compliant with home exercise program.  Response to previous treatment: no concerns   Functional change: continued intermittent pain of shoulder and mid humerus    Pain: 4/10  Location: left shoulder and mid-humerus       Objective     Upper Extremity Range of Motion (ROM)  Right  Initial Eval  AROM Pain  A/PROM UE ROM Left  Initial Eval  A/PROM Pain  A/PROM 4/25/2022   145 n Shoulder flexion 134/ passively hard end feel 100 deg superior aspect shoulder y mid to end range 124, painful   165 n Shoulder abduction        Functional ER: T2 n Shoulder external rotation Functional ER: T1 y less than internal rotation  T1, painful   Functional IR: T8 n Shoulder internal rotation Functional IR: L5 y during motion L1, painful       Elbow flexion            Elbow extension            Wrist flexion            Wrist extension        All fields left blank intentionally and signify "not tested"     Upper Extremity Strength MMT  /5  Right  Initial Eval Pain   Left  Initial Eval Pain 4/25/2022   5 n Shoulder flexion 5- n 5   5 n Shoulder " "abduction 5- n    5 n Elbow flexion 5- n 5   5 n Elbow extension 5- n 5   5 n Shoulder external rotation 5- n 4+, painful   5 n Shoulder internal rotation 5- n 5-       Wrist flexion            Wrist extension        All fields left blank intentionally and signify "not tested"     Neurofascial dynamics evaluation  - ULNTT1 (median): (+) arm adducted > 4/25/2022: (+) arm abducted 70 degrees      Analia received therapeutic exercises to develop strength, endurance, ROM, flexibility and posture for 25 minutes including:  Shoulder reverse rolls x 20  Shoulder forward rolls x 20   Scapular retraction x 10   Pendulum 20x each way   Physio ball flexion on mat x 10 (thumbs face towards ceiling)  Isometric shoulder flex, IR, and ER x 10 each way   Median nerve glide decompression x 10  Median nerve glide with elbow and wrist flex in standing (Can do this in supine as well) x 10   Pectoralis stretch (corner stretch) x 10  Open book thoracic stretch on mat x 10  Pulleys in Left shoulder IR x 10  Left upper trap stretch x 5x, 10 sec holds   AAROM of B) shoulder flexion with cane, x10  AROM of standing Is, Ys, Ts up to 90 degrees, x5 with 1lb to left wrap around wrist   Sleeper's stretch on L shoulder, x10  Shoulder ER brugger with YTB 2x10  Standing shoulder ER and IR with YTB 2x10    Analia received the following manual therapy techniques: Joint mobilizations and Soft tissue Mobilization were applied to the: Left shoulder for 15 minutes, including:  PROM stretch in flexion, abduction, horizontal adduction  Shoulder distraction to improve shoulder ROM   Shoulder glenohumeral posterior glide in short axis grade 2 to improve ROM  AC joint mobilization grade 2 in sitting position   Biofreezes to Left shoulder for decrease pain    Home Exercises Provided and Patient Education Provided     Written Home Exercises Provided: yes.  Exercises were reviewed and Analia was able to demonstrate them prior to the end of the session.  " Analia demonstrated good  understanding of the education provided.     Education provided:   - HEP and importance of compliance, Role of PT, Posterior capsule stretches to provide relief of L shoulder pain.     Assessment     Patient arrived to therapy clinic reporting of increase left shoulder pain today possibly caused by laying on her left side during sleeping position. Patient does use a pillow to left shoulder to help alleviate pain and pressure, but the pain still persist this visit. I instructed patient to try to avoid laying on her left side for now until the pain subsided. Patient expressed understanding. Added shoulder ER and IR strengthening with resistance this visit which patient tolerated fairly well. Ended session with biofreeze to left shoulder for decrease pain. Overall, patient is making steady progress towards PT goals at this time. Re-assess completed by PT.     Reassessment of patient's shoulder range of motion and strength reveal improvements in both; however continues with limited ACJ and GHJ mobility, soft tissue restrictions inferior GHJ capsule and rotator cuff, and neuromuscular control deficits of rotator cuff limiting shoulder elevation and functional internal rotation while keeping humeral head seated in acetabulum. Would benefit from continued therapy to address ACJ/GHJ mobility and rotator cuff stability/control.    Analia Is progressing slowly towards her goals.   Pt prognosis is Good.     Pt will continue to benefit from skilled outpatient physical therapy to address the deficits listed in the problem list box on initial evaluation, provide pt/family education and to maximize pt's level of independence in the home and community environment.     Pt's spiritual, cultural and educational needs considered and pt agreeable to plan of care and goals.     Anticipated barriers to physical therapy: None     Goals:   Short Term Goals: 4 weeks   1. Patient will demonstrate independence in  prescribed home exercise program once a day with proper form, sets and reps to improve initiation, strength and endurance of muscles needed to return to prior level of function Met 4/25/2022  2. Patient will demonstrate shoulder flexion active range of motion greater or equal to 150 degrees to be able to reach into overhead cabinets and dress him/herself for painfree activities of daily living Ongoing 4/25/2022  3. Patient will achieve upper extremity strength 5/5 to be able to lift 10 pound grocery bag painfree Ongoing 4/25/2022     Long Term Goals: 8 weeks   1. Patient will achieve shoulder functional external rotation to T2 to allow ease with washing hair and neck Ongoing 4/25/2022  2. Patient will achieve functional internal rotation to T8 to allow ease with washing back Ongoing 4/25/2022  3. Patient will demonstrate cross body adduction with L UE to posterior aspect R shoulder to allow patient to pull up bra straps without difficulty. Met, with pain 4/25/2022    Plan     Plan of care Certification: 3/25/2022 to 5/25/2022.     Outpatient Physical Therapy 2 times weekly for 8 weeks to include the following interventions: Cervical/Lumbar Traction, Electrical Stimulation  , Manual Therapy, Moist Heat/ Ice, Neuromuscular Re-ed, Patient Education, Self Care, Therapeutic Activities, Therapeutic Exercise and Ultrasound. Interventions can be provided by PTA as a treatment team.    Continue with POC. [facility]                                 Physical Therapy       PT/PTA met face to face to discuss pt's treatment plan and progress towards established goals. Pt will be seen by a physical therapist minimally every 6th visit or every 30 days.    Please see Updated Plan of Care dated 4/25/2022 for changes and updated goals.    Joy Mata, PTA   4/25/2022     Lynn Veras, PT  4/25/2022

## 2022-04-28 ENCOUNTER — CLINICAL SUPPORT (OUTPATIENT)
Dept: REHABILITATION | Facility: HOSPITAL | Age: 52
End: 2022-04-28
Payer: COMMERCIAL

## 2022-04-28 DIAGNOSIS — M25.522 LEFT ELBOW PAIN: Primary | ICD-10-CM

## 2022-04-28 PROCEDURE — 97110 THERAPEUTIC EXERCISES: CPT | Mod: PN,CQ

## 2022-04-28 NOTE — PROGRESS NOTES
[facility]  Physical Therapy Daily Note     Name: Analia Apodaca  Clinic Number: [id number]    Therapy Diagnosis:   Encounter Diagnosis   Name Primary?    Left elbow pain Yes     Physician: Clara Clark PA-C    Visit Date: 4/28/2022    Physician Orders: PT Eval and Treat  Medical Diagnosis from Referral: Left elbow pain   Evaluation Date: 3/25/2022  Authorization Period Expiration: 12/31/2022   Plan of Care Expiration: 5/25/20222  Progress Note Due: 4/25/2022  Visit # / Visits authorized: 7/ 20   FOTO: 2/3     Precautions: Standard     Visit #: 7 of 20  PTA Visit #: 3  Time In: 1600  Time Out: 1635  Total Billable Time: 35 minutes (23 minutes billable only due time split in double booking)    Subjective     Pt reports: she thinks she may have slept wrong because her left scapula feels sore. No major pain to shoulder joint or radiating pain down to humerus today.   She was compliant with home exercise program.  Response to previous treatment: no concerns   Functional change: continued intermittent pain of shoulder and mid humerus    Pain: 0/10  Location: left shoulder and mid-humerus       Objective     Analia received therapeutic exercises to develop strength, endurance, ROM, flexibility and posture for 20 minutes including:  Shoulder reverse rolls x 20  Shoulder forward rolls x 20   Scapular retraction x 10   Pendulum 20x each way   Physio ball flexion on mat x 10 (thumbs face towards ceiling)  Isometric shoulder flex, IR, and ER x 10 each way   Median nerve glide decompression x 10  Median nerve glide with elbow and wrist flex in standing (Can do this in supine as well) x 10   Pectoralis stretch (corner stretch) x 10  Open book thoracic stretch on mat x 10  Pulleys in Left shoulder IR x 10  Left upper trap stretch x 5x, 10 sec holds   AAROM of B) shoulder flexion with cane, x10  AROM of standing Is, Ys, Ts up to 90 degrees, x5 with 1lb to left wrap around wrist   Sleeper's stretch  on L shoulder, x10  Shoulder ER brugger with YTB 2x10  Standing shoulder ER and IR with YTB 2x10    Analia received the following manual therapy techniques: Joint mobilizations and Soft tissue Mobilization were applied to the: Left shoulder for 15 minutes, including:  PROM stretch in flexion, abduction, horizontal adduction  Shoulder distraction to improve shoulder ROM   Shoulder glenohumeral posterior glide in short axis grade 2 to improve ROM  AC joint mobilization grade 2 in sitting position   Biofreezes to Left shoulder for decrease pain  Shoulder posterior capsule glide grade 2     Home Exercises Provided and Patient Education Provided     Written Home Exercises Provided: yes.  Exercises were reviewed and Analia was able to demonstrate them prior to the end of the session.  Analia demonstrated good  understanding of the education provided.     Education provided:   - HEP and importance of compliance, Role of PT, Posterior capsule stretches to provide relief of L shoulder pain.     Assessment     Patient appeared to shoulder increase shoulder ROM and less pain in shoulder joint today. However patient is currently experiencing soreness to scapular region. Provided soft tissue mobilization to this area for decrease pain or soreness and added posterior capsule glide in today's session for improved GHJ mobility. Patient responded fairly well with all interventions. Will progress as tolerated.     Analia Is progressing slowly towards her goals.   Pt prognosis is Good.     Pt will continue to benefit from skilled outpatient physical therapy to address the deficits listed in the problem list box on initial evaluation, provide pt/family education and to maximize pt's level of independence in the home and community environment.     Pt's spiritual, cultural and educational needs considered and pt agreeable to plan of care and goals.     Anticipated barriers to physical therapy: None     Goals:   Short Term Goals: 4  weeks   1. Patient will demonstrate independence in prescribed home exercise program once a day with proper form, sets and reps to improve initiation, strength and endurance of muscles needed to return to prior level of function Met 4/25/2022  2. Patient will demonstrate shoulder flexion active range of motion greater or equal to 150 degrees to be able to reach into overhead cabinets and dress him/herself for painfree activities of daily living Ongoing 4/25/2022  3. Patient will achieve upper extremity strength 5/5 to be able to lift 10 pound grocery bag painfree Ongoing 4/25/2022     Long Term Goals: 8 weeks   1. Patient will achieve shoulder functional external rotation to T2 to allow ease with washing hair and neck Ongoing 4/25/2022  2. Patient will achieve functional internal rotation to T8 to allow ease with washing back Ongoing 4/25/2022  3. Patient will demonstrate cross body adduction with L UE to posterior aspect R shoulder to allow patient to pull up bra straps without difficulty. Met, with pain 4/25/2022    Plan     Plan of care Certification: 3/25/2022 to 5/25/2022.     Outpatient Physical Therapy 2 times weekly for 8 weeks to include the following interventions: Cervical/Lumbar Traction, Electrical Stimulation  , Manual Therapy, Moist Heat/ Ice, Neuromuscular Re-ed, Patient Education, Self Care, Therapeutic Activities, Therapeutic Exercise and Ultrasound. Interventions can be provided by PTA as a treatment team.    Continue with POC.     Joy Mata PTA   4/28/2022

## 2022-05-05 ENCOUNTER — CLINICAL SUPPORT (OUTPATIENT)
Dept: REHABILITATION | Facility: HOSPITAL | Age: 52
End: 2022-05-05
Payer: COMMERCIAL

## 2022-05-05 DIAGNOSIS — M25.522 LEFT ELBOW PAIN: Primary | ICD-10-CM

## 2022-05-05 PROCEDURE — 97140 MANUAL THERAPY 1/> REGIONS: CPT | Mod: PN,CQ

## 2022-05-05 PROCEDURE — 97110 THERAPEUTIC EXERCISES: CPT | Mod: PN,CQ

## 2022-05-05 NOTE — PROGRESS NOTES
[facility]  Physical Therapy Daily Note     Name: Analia Apodaca  Clinic Number: [id number]    Therapy Diagnosis:   Encounter Diagnosis   Name Primary?    Left elbow pain Yes     Physician: Clara Clark PA-C    Visit Date: 5/5/2022    Physician Orders: PT Eval and Treat  Medical Diagnosis from Referral: Left elbow pain   Evaluation Date: 3/25/2022  Authorization Period Expiration: 12/31/2022   Plan of Care Expiration: 5/25/20222  Progress Note Due: 5/16/2022  Visit # / Visits authorized: 8/ 20   FOTO: 2/3     Precautions: Standard     Visit #: 8 of 20  PTA Visit #: 2  Time In: 1600  Time Out: 1640  Total Billable Time: 40 minutes     Subjective     Pt reports: the pain has gotten better, but the pain is still there starts at apex shoulder that radiates down to mid humerus. She just came back from [place] yesterday from her vacation. Patient reports while on vacation she did had an event where she props up her L arm for support and it felt almost like it locked up and she had to use her R hand to bring the L arm down.   She was compliant with home exercise program.  Response to previous treatment: pain after last visit  Functional change: continued intermittent pain of shoulder and mid humerus    Pain: 5/10  Location: left shoulder and mid-humerus       Objective     Analia received therapeutic exercises to develop strength, endurance, ROM, flexibility and posture for 25 minutes including:  Shoulder reverse rolls x 20  Shoulder forward rolls x 20   Scapular retraction x 20   Pendulum 20x each way   Physio ball flexion on mat x 10 (thumbs face towards ceiling)  Isometric shoulder flex, IR, and ER x 10 each way   Median nerve glide decompression x 10  Median nerve glide with elbow and wrist flex in standing (Can do this in supine as well) x 10   Pectoralis stretch (corner stretch) x 10  Open book thoracic stretch on mat x 10  Pulleys in Left shoulder IR x 10  Left upper trap stretch x  5x, 10 sec holds   AAROM of B) shoulder flexion with cane, x10  AROM of standing Is, Ys, Ts up to 90 degrees, x5 with 1lb to left wrap around wrist   Sleeper's stretch on L shoulder, x10  Shoulder ER brugger with GTB 2x10  Standing shoulder ER and IR with YTB 2x10  Standing rows with GTB x 20  Cable lats pull down with 3lb each arms x20    Analia received the following manual therapy techniques: Joint mobilizations and Soft tissue Mobilization were applied to the: Left shoulder for 15 minutes, including:  PROM stretch in flexion, abduction, horizontal adduction  Shoulder distraction to improve shoulder ROM   Shoulder glenohumeral posterior glide in short axis grade 2 to improve ROM  AC joint mobilization grade 2 in sitting position   Biofreezes to Left shoulder for decrease pain  Shoulder posterior capsule glide grade 2   Soft tissue mobilization to AC joint for increase flexibility and ROM in shoulder     Home Exercises Provided and Patient Education Provided     Written Home Exercises Provided: yes.  Exercises were reviewed and Analia was able to demonstrate them prior to the end of the session.  Analia demonstrated good  understanding of the education provided.     Education provided:   - HEP and importance of compliance, Role of PT, Posterior capsule stretches to provide relief of L shoulder pain.     Assessment     Patient seems to continue to have neural tension pain at mid humerus of Left arm that radiates from shoulder. Focussed session shoulder strengthening, stretches, and pain reduction. Patient responded fairly with manual therapy, however responded well with biofreeze to L shoulder. I explained to patient that we will incorporate nerve flossing exercises to reduce the pain radiating down to humerus as well as thoracic, ribcage, GHJ, and ACJ flexibility to improve her overall shoulder functional mobility. Patient expressed understanding.     Analia Is progressing slowly towards her goals.   Pt  prognosis is Good.     Pt will continue to benefit from skilled outpatient physical therapy to address the deficits listed in the problem list box on initial evaluation, provide pt/family education and to maximize pt's level of independence in the home and community environment.     Pt's spiritual, cultural and educational needs considered and pt agreeable to plan of care and goals.     Anticipated barriers to physical therapy: None     Goals:   Short Term Goals: 4 weeks   1. Patient will demonstrate independence in prescribed home exercise program once a day with proper form, sets and reps to improve initiation, strength and endurance of muscles needed to return to prior level of function Met 4/25/2022  2. Patient will demonstrate shoulder flexion active range of motion greater or equal to 150 degrees to be able to reach into overhead cabinets and dress him/herself for painfree activities of daily living Ongoing 4/25/2022  3. Patient will achieve upper extremity strength 5/5 to be able to lift 10 pound grocery bag painfree Ongoing 4/25/2022     Long Term Goals: 8 weeks   1. Patient will achieve shoulder functional external rotation to T2 to allow ease with washing hair and neck Ongoing 4/25/2022  2. Patient will achieve functional internal rotation to T8 to allow ease with washing back Ongoing 4/25/2022  3. Patient will demonstrate cross body adduction with L UE to posterior aspect R shoulder to allow patient to pull up bra straps without difficulty. Met, with pain 4/25/2022    Plan     Plan of care Certification: 3/25/2022 to 5/25/2022.     Outpatient Physical Therapy 2 times weekly for 8 weeks to include the following interventions: Cervical/Lumbar Traction, Electrical Stimulation  , Manual Therapy, Moist Heat/ Ice, Neuromuscular Re-ed, Patient Education, Self Care, Therapeutic Activities, Therapeutic Exercise and Ultrasound. Interventions can be provided by PTA as a treatment team.    Continue with POC.      Joy Mata, PTA   5/5/2022

## 2022-05-10 ENCOUNTER — PATIENT MESSAGE (OUTPATIENT)
Dept: INTERNAL MEDICINE | Facility: CLINIC | Age: 52
End: 2022-05-10

## 2022-05-10 ENCOUNTER — TELEPHONE (OUTPATIENT)
Dept: INTERNAL MEDICINE | Facility: CLINIC | Age: 52
End: 2022-05-10
Payer: COMMERCIAL

## 2022-05-10 ENCOUNTER — PATIENT MESSAGE (OUTPATIENT)
Dept: INTERNAL MEDICINE | Facility: CLINIC | Age: 52
End: 2022-05-10
Payer: COMMERCIAL

## 2022-05-10 ENCOUNTER — CLINICAL SUPPORT (OUTPATIENT)
Dept: INTERNAL MEDICINE | Facility: CLINIC | Age: 52
End: 2022-05-10
Payer: COMMERCIAL

## 2022-05-10 DIAGNOSIS — N00.8 ACUTE (DIFFUSE) PROLIFERATIVE GLOMERULONEPHRITIS: Primary | ICD-10-CM

## 2022-05-10 DIAGNOSIS — N30.01 ACUTE CYSTITIS WITH HEMATURIA: Primary | ICD-10-CM

## 2022-05-10 PROCEDURE — 99499 UNLISTED E&M SERVICE: CPT | Mod: S$GLB,,, | Performed by: NURSE PRACTITIONER

## 2022-05-10 PROCEDURE — 99499 NO LOS: ICD-10-PCS | Mod: S$GLB,,, | Performed by: NURSE PRACTITIONER

## 2022-05-10 RX ORDER — NITROFURANTOIN (MACROCRYSTALS) 100 MG/1
100 CAPSULE ORAL EVERY 12 HOURS
Qty: 14 CAPSULE | Refills: 0 | Status: SHIPPED | OUTPATIENT
Start: 2022-05-10 | End: 2022-05-17

## 2022-05-10 NOTE — TELEPHONE ENCOUNTER
----- Message from Angelita Castaneda MA sent at 5/10/2022 10:06 AM CDT -----  Analia Apodaca  MRN: [id number]  : 1970  PCP: Shital Loera  Home Phone      [phone]  Work Phone      Not on file.  Mobile          [phone]      MESSAGE:     Patient is switching pharmacies to [facility].    Send meds for UTI to [facility].

## 2022-05-12 ENCOUNTER — CLINICAL SUPPORT (OUTPATIENT)
Dept: REHABILITATION | Facility: HOSPITAL | Age: 52
End: 2022-05-12
Payer: COMMERCIAL

## 2022-05-12 DIAGNOSIS — M25.522 LEFT ELBOW PAIN: Primary | ICD-10-CM

## 2022-05-12 PROCEDURE — 97110 THERAPEUTIC EXERCISES: CPT | Mod: PN,CQ

## 2022-05-12 PROCEDURE — 97140 MANUAL THERAPY 1/> REGIONS: CPT | Mod: PN,CQ

## 2022-05-12 NOTE — PROGRESS NOTES
[facility]  Physical Therapy Daily Note     Name: Analia Apodaca  Clinic Number: [id number]    Therapy Diagnosis:   Encounter Diagnosis   Name Primary?    Left elbow pain Yes     Physician: Clara Clark PA-C    Visit Date: 5/12/2022    Physician Orders: PT Eval and Treat  Medical Diagnosis from Referral: Left elbow pain   Evaluation Date: 3/25/2022  Authorization Period Expiration: 12/31/2022   Plan of Care Expiration: 5/25/20222  Progress Note Due: 5/16/2022  Visit # / Visits authorized: 9/ 20   FOTO: 2/3     Precautions: Standard     Visit #: 9 of 20  PTA Visit #: 3  Time In: 1600  Time Out: 1640  Total Billable Time: 40 minutes     Subjective     Pt reports: she has been having trouble sleeping at night this whole week due to pain to the Left shoulder and unable to find a comfortable position to sleep in. Patient is also having Right sided hip pain from bladder infection.   She was compliant with home exercise program.  Response to previous treatment: pain after last visit  Functional change: continued intermittent pain of shoulder and mid humerus    Pain: 5/10  Location: left shoulder and mid-humerus       Objective     Analia received therapeutic exercises to develop strength, endurance, ROM, flexibility and posture for 25 minutes including:  Shoulder reverse rolls x 20  Shoulder forward rolls x 20   Scapular retraction x 20   Pendulum 20x each way   Physio ball flexion on mat x 10 (thumbs face towards ceiling)  Isometric shoulder flex, IR, and ER x 10 each way   Median nerve glide decompression x 10  Median nerve glide with elbow and wrist flex in standing (Can do this in supine as well) x 10   Pectoralis stretch (corner stretch) x 10  Open book thoracic stretch on mat x 10  Pulleys in Left shoulder IR x 10  Left upper trap stretch x 5x, 10 sec holds   AAROM of B) shoulder flexion with cane, x10  AROM of standing Is, Ys, Ts up to 90 degrees, x5 with 1lb to left wrap around  wrist   Sleeper's stretch on L shoulder, x10  Shoulder ER brugger with GTB 2x10  Standing shoulder ER and IR with YTB 2x10  Standing rows with GTB x 20  Prone shoulder extension with scapular retraction x 10  Chair thoracic stretch with towel roll behind upper back x 10  Cable lats pull down with 3lb each arms x10 (go out in scaption position)  Standing shoulder IR with palms face forward and shoulder ER with palms roll out to side x 10  Standing W scapular retraction x 10  Chin tucks x 10    Analia received the following manual therapy techniques: Joint mobilizations and Soft tissue Mobilization were applied to the: Left shoulder for 20 minutes, including:  PROM stretch in flexion, abduction, horizontal adduction  Shoulder distraction to improve shoulder ROM   Shoulder glenohumeral posterior glide in short axis grade 2 to improve ROM  AC joint mobilization grade 2 in sitting position   Biofreezes to Left shoulder for decrease pain  Shoulder posterior capsule glide grade 2   Soft tissue mobilization to AC joint for increase flexibility and ROM in shoulder   Soft tissue mobilization to deltoids, triceps, and biceps    Home Exercises Provided and Patient Education Provided     Written Home Exercises Provided: yes.  Exercises were reviewed and Analia was able to demonstrate them prior to the end of the session.  Analia demonstrated good  understanding of the education provided.     Education provided:   - HEP and importance of compliance, Role of PT, Posterior capsule stretches to provide relief of L shoulder pain.   - Sleeping position for comfort and reduce Left shoulder pain. Use pillows between knees and back to reduce Right sided hip pain.     Assessment     Patient arrived to therapy clinic with increase L shoulder pain with decrease shoulder ROM less than 90 degrees in abduction. Focused session on improving AC joint mobility, GH joint mobility, scapular strengthening, postural awareness, and pain reduction.  After manual therapy treatment, patient was able to show improved shoulder ROM with more than 90 degrees abduction compared to beginning of session. Added scapular exercises, thoracic mobility, and postural exercises. Also educated patient on sleeping position for comfort and reduce pain. Demonstrates to patient on sleeping position and have patient practice in comfort sleeping position which patient was able to show good demonstration back to therapist. Overall, patient did fairly well in today's session with less pain and more shoulder ROM after session. Will follow up with patient regarding her sleeping position at home and home exercises program.     Analia Is progressing slowly towards her goals.   Pt prognosis is Good.     Pt will continue to benefit from skilled outpatient physical therapy to address the deficits listed in the problem list box on initial evaluation, provide pt/family education and to maximize pt's level of independence in the home and community environment.     Pt's spiritual, cultural and educational needs considered and pt agreeable to plan of care and goals.     Anticipated barriers to physical therapy: None     Goals:   Short Term Goals: 4 weeks   1. Patient will demonstrate independence in prescribed home exercise program once a day with proper form, sets and reps to improve initiation, strength and endurance of muscles needed to return to prior level of function Met 4/25/2022  2. Patient will demonstrate shoulder flexion active range of motion greater or equal to 150 degrees to be able to reach into overhead cabinets and dress him/herself for painfree activities of daily living Ongoing 4/25/2022  3. Patient will achieve upper extremity strength 5/5 to be able to lift 10 pound grocery bag painfree Ongoing 4/25/2022     Long Term Goals: 8 weeks   1. Patient will achieve shoulder functional external rotation to T2 to allow ease with washing hair and neck Ongoing 4/25/2022  2. Patient will  achieve functional internal rotation to T8 to allow ease with washing back Ongoing 4/25/2022  3. Patient will demonstrate cross body adduction with L UE to posterior aspect R shoulder to allow patient to pull up bra straps without difficulty. Met, with pain 4/25/2022    Plan     Plan of care Certification: 3/25/2022 to 5/25/2022.     Outpatient Physical Therapy 2 times weekly for 8 weeks to include the following interventions: Cervical/Lumbar Traction, Electrical Stimulation  , Manual Therapy, Moist Heat/ Ice, Neuromuscular Re-ed, Patient Education, Self Care, Therapeutic Activities, Therapeutic Exercise and Ultrasound. Interventions can be provided by PTA as a treatment team.    Continue with POC.     Joy Mata PTA   5/12/2022

## 2022-05-16 ENCOUNTER — CLINICAL SUPPORT (OUTPATIENT)
Dept: REHABILITATION | Facility: HOSPITAL | Age: 52
End: 2022-05-16
Payer: COMMERCIAL

## 2022-05-16 DIAGNOSIS — M25.522 LEFT ELBOW PAIN: Primary | ICD-10-CM

## 2022-05-16 PROCEDURE — 97110 THERAPEUTIC EXERCISES: CPT | Mod: PN,CQ

## 2022-05-16 PROCEDURE — 97140 MANUAL THERAPY 1/> REGIONS: CPT | Mod: PN,CQ

## 2022-05-16 NOTE — PROGRESS NOTES
"  [facility]  Physical Therapy Daily Note     Name: Analia Apodaca  Clinic Number: [id number]    Therapy Diagnosis:   Encounter Diagnosis   Name Primary?    Left elbow pain Yes     Physician: Clara Clark PA-C    Visit Date: 5/16/2022    Physician Orders: PT Eval and Treat  Medical Diagnosis from Referral: Left elbow pain   Evaluation Date: 3/25/2022  Authorization Period Expiration: 12/31/2022   Plan of Care Expiration: 5/25/20222  Progress Note Due: 5/16/2022  Visit # / Visits authorized: 10/ 20   FOTO: 2/3     Precautions: Standard     Visit #: 10 of 20  PTA Visit #: 4  Time In: 1600  Time Out: 1638  Total Billable Time: 38 minutes (33 minutes billable)    Subjective     Pt reports: she did uses ice maybe on Friday due to increase soreness/pain to Left shoulder; however no major pain reported today.   She was compliant with home exercise program.  Response to previous treatment: no concerns   Functional change: continued intermittent pain of shoulder and mid humerus    Pain: 5/10  Location: left shoulder and mid-humerus       Objective     Analia received therapeutic exercises to develop strength, endurance, ROM, flexibility and posture for 23 minutes including:  Shoulder reverse rolls x 20  Shoulder forward rolls x 20   Scapular retraction x 20   Pendulum 20x each way   Physio ball flexion on mat x 10 (thumbs face towards ceiling)  Isometric shoulder flex, IR, and ER x 10 each way   Median nerve glide decompression x 10  Median nerve glide with elbow and wrist flex in standing (Can do this in supine as well) x 10   Pectoralis stretch (corner stretch) x 5x10" sec holds   Open book thoracic stretch on mat x 10  Pulleys in Left shoulder IR x 10  Left upper trap stretch x 5x, 10 sec holds   AAROM of B) shoulder flexion with cane, x10  AROM of standing Is, Ys, Ts up to 90 degrees, x5 with 1lb to left wrap around wrist   Sleeper's stretch on L shoulder, x10  Shoulder ER brugger with GTB " 2x10  Standing shoulder ER and IR with YTB 2x10  Standing rows with GTB x 20  Prone shoulder extension with scapular retraction 2 x 10  Chair thoracic stretch with towel roll behind upper back x 15  Cable lats pull down with 3lb each arms x10 (go out in scaption position)  Standing shoulder IR with palms face forward and shoulder ER with palms roll out to side 2 x 10  Standing W scapular retraction 2 x 10  Chin tucks 2 x 10    Analia received the following manual therapy techniques: Joint mobilizations and Soft tissue Mobilization were applied to the: Left shoulder for 10 minutes, including:  PROM stretch in flexion, abduction, horizontal adduction  Shoulder distraction to improve shoulder ROM   Shoulder glenohumeral posterior glide in short axis grade 2 to improve ROM  AC joint mobilization grade 2 in sitting position   Biofreezes to Left shoulder for decrease pain  Shoulder posterior capsule glide grade 2   Soft tissue mobilization to AC joint for increase flexibility and ROM in shoulder   Soft tissue mobilization to deltoids, triceps, and biceps    Ended session with CP x 5 mins to L shoulder for decrease pain and inflammation.    Home Exercises Provided and Patient Education Provided     Written Home Exercises Provided: yes.  Exercises were reviewed and Analia was able to demonstrate them prior to the end of the session.  Analia demonstrated good  understanding of the education provided.     Education provided:   - HEP and importance of compliance, Role of PT, Posterior capsule stretches to provide relief of L shoulder pain.   - Sleeping position for comfort and reduce Left shoulder pain. Use pillows between knees and back to reduce Right sided hip pain.     Assessment     Patient tolerated today's therapy session well with no reports of increase shoulder/arm/elbow pain. Noted patient was able to increase her tolerance to therapeutic exercises today and no pain in standing lats pull down in scaption position.  Patient is making steady progress towards her treatment goals. Will continue to progress as tolerated.     Analia Is progressing towards her goals.   Pt prognosis is Good.     Pt will continue to benefit from skilled outpatient physical therapy to address the deficits listed in the problem list box on initial evaluation, provide pt/family education and to maximize pt's level of independence in the home and community environment.     Pt's spiritual, cultural and educational needs considered and pt agreeable to plan of care and goals.     Anticipated barriers to physical therapy: None     Goals:   Short Term Goals: 4 weeks   1. Patient will demonstrate independence in prescribed home exercise program once a day with proper form, sets and reps to improve initiation, strength and endurance of muscles needed to return to prior level of function Met 4/25/2022  2. Patient will demonstrate shoulder flexion active range of motion greater or equal to 150 degrees to be able to reach into overhead cabinets and dress him/herself for painfree activities of daily living Ongoing 4/25/2022  3. Patient will achieve upper extremity strength 5/5 to be able to lift 10 pound grocery bag painfree Ongoing 4/25/2022     Long Term Goals: 8 weeks   1. Patient will achieve shoulder functional external rotation to T2 to allow ease with washing hair and neck Ongoing 4/25/2022  2. Patient will achieve functional internal rotation to T8 to allow ease with washing back Ongoing 4/25/2022  3. Patient will demonstrate cross body adduction with L UE to posterior aspect R shoulder to allow patient to pull up bra straps without difficulty. Met, with pain 4/25/2022    Plan     Plan of care Certification: 3/25/2022 to 5/25/2022.     Outpatient Physical Therapy 2 times weekly for 8 weeks to include the following interventions: Cervical/Lumbar Traction, Electrical Stimulation  , Manual Therapy, Moist Heat/ Ice, Neuromuscular Re-ed, Patient Education, Self  Care, Therapeutic Activities, Therapeutic Exercise and Ultrasound. Interventions can be provided by PTA as a treatment team.    Continue with POC.     Joy Mata, MATHEW   5/16/2022

## 2022-05-19 ENCOUNTER — CLINICAL SUPPORT (OUTPATIENT)
Dept: REHABILITATION | Facility: HOSPITAL | Age: 52
End: 2022-05-19
Payer: COMMERCIAL

## 2022-05-19 DIAGNOSIS — M25.522 LEFT ELBOW PAIN: Primary | ICD-10-CM

## 2022-05-19 PROCEDURE — 97140 MANUAL THERAPY 1/> REGIONS: CPT | Mod: PN,CQ

## 2022-05-19 PROCEDURE — 97110 THERAPEUTIC EXERCISES: CPT | Mod: PN,CQ

## 2022-05-19 NOTE — PROGRESS NOTES
"  [facility]  Physical Therapy Daily Note     Name: Analia Apodaca  Clinic Number: [id number]    Therapy Diagnosis:   Encounter Diagnosis   Name Primary?    Left elbow pain Yes     Physician: Clara Clark PA-C    Visit Date: 5/19/2022    Physician Orders: PT Eval and Treat  Medical Diagnosis from Referral: Left elbow pain   Evaluation Date: 3/25/2022  Authorization Period Expiration: 12/31/2022   Plan of Care Expiration: 5/25/20222  Progress Note Due: 5/16/2022  Visit # / Visits authorized: 11/ 20   FOTO: 2/3     Precautions: Standard     Visit #: 11 of 20  PTA Visit #: 5  Time In: 1600  Time Out: 1640  Total Billable Time: 40 minutes (35 minutes billable)    Subjective     Pt reports: she still feels a little pain to mid humerus but it has gotten better with therapy and doing her exercises. She still can't lift her arm with elbow flexed and can't sleeps at night because she can't find a comfortable position to sleep in.   She was compliant with home exercise program.  Response to previous treatment: no concerns   Functional change: continued intermittent pain of shoulder and mid humerus    Pain: 3/10  Location: left shoulder and mid-humerus       Objective     Analia received therapeutic exercises to develop strength, endurance, ROM, flexibility and posture for 25 minutes including:  Shoulder reverse rolls x 20  Shoulder forward rolls x 20   Scapular retraction x 20   Pendulum 20x each way   Physio ball flexion on mat x 10 (thumbs face towards ceiling)  Isometric shoulder flex, IR, and ER x 10 each way   Median nerve glide decompression x 10  Median nerve glide with elbow and wrist flex in standing (Can do this in supine as well) x 10   Pectoralis stretch (corner stretch)  5x10" sec holds   Open book thoracic stretch on mat x 10  Pulleys in Left shoulder IR x 10  Left upper trap stretch x 5x, 10 sec holds   AAROM of B) shoulder flexion with cane, x10  AROM of standing Is, Ys, Ts up to " 90 degrees, x5 with 1lb to left wrap around wrist   Sleeper's stretch on L shoulder, x10  Shoulder ER brugger with GTB 2x10  Standing shoulder ER and IR with YTB 2x10  Standing rows with GTB x 20  Prone shoulder extension with scapular retraction 2 x 10  Chair thoracic stretch with towel roll behind upper back x 15  Cable lats pull down with 3lb each arms x10 (go out in scaption position)  Standing shoulder IR with palms face forward and shoulder ER with palms roll out to side 2 x 10  Standing W scapular retraction 2 x 10  Chin tucks 2 x 10    Analia received the following manual therapy techniques: Joint mobilizations and Soft tissue Mobilization were applied to the: Left shoulder for 10 minutes, including:  PROM stretch in flexion, abduction, horizontal adduction  Shoulder distraction to improve shoulder ROM   Shoulder glenohumeral posterior glide in short axis grade 2 to improve ROM  AC joint mobilization grade 2 in sitting position   Biofreezes to Left shoulder for decrease pain  Shoulder posterior capsule glide grade 2   Soft tissue mobilization to AC joint for increase flexibility and ROM in shoulder   Soft tissue mobilization to deltoids, triceps, and biceps    Ended session with CP x 0 mins to L shoulder for decrease pain and inflammation.    Home Exercises Provided and Patient Education Provided     Written Home Exercises Provided: yes.  Exercises were reviewed and Analia was able to demonstrate them prior to the end of the session.  Analia demonstrated good  understanding of the education provided.     Education provided:   - HEP and importance of compliance, Role of PT, Posterior capsule stretches to provide relief of L shoulder pain.   - Sleeping position for comfort and reduce Left shoulder pain. Use pillows between knees and back to reduce Right sided hip pain.     Assessment     Patient still has pain to Left mid humerus and limited shoulder abduction with elbow flexed. However her overall shoulder  ROM and strength is getting better. Patient appeared to show increase ROM after manual therapy with shoulder abduction and elbow flexed going from approximately 70 to 90 degrees range. Continue working on postural control and awareness, worked on scapular muscles strengthening, and rotator cuff strengthening as well as thoracic mobility. Patient would continue to benefit from PT to address these deficits in order for patient to perform her daily routine without pain. Patient needs to be re-assess by PT next visit and update POC due to POC expires on 5/25/2022.    Analia Is progressing towards her goals.   Pt prognosis is Good.     Pt will continue to benefit from skilled outpatient physical therapy to address the deficits listed in the problem list box on initial evaluation, provide pt/family education and to maximize pt's level of independence in the home and community environment.     Pt's spiritual, cultural and educational needs considered and pt agreeable to plan of care and goals.     Anticipated barriers to physical therapy: None     Goals:   Short Term Goals: 4 weeks   1. Patient will demonstrate independence in prescribed home exercise program once a day with proper form, sets and reps to improve initiation, strength and endurance of muscles needed to return to prior level of function Met 4/25/2022  2. Patient will demonstrate shoulder flexion active range of motion greater or equal to 150 degrees to be able to reach into overhead cabinets and dress him/herself for painfree activities of daily living Ongoing 4/25/2022  3. Patient will achieve upper extremity strength 5/5 to be able to lift 10 pound grocery bag painfree Ongoing 4/25/2022     Long Term Goals: 8 weeks   1. Patient will achieve shoulder functional external rotation to T2 to allow ease with washing hair and neck Ongoing 4/25/2022  2. Patient will achieve functional internal rotation to T8 to allow ease with washing back Ongoing  4/25/2022  3. Patient will demonstrate cross body adduction with L UE to posterior aspect R shoulder to allow patient to pull up bra straps without difficulty. Met, with pain 4/25/2022    Plan     Plan of care Certification: 3/25/2022 to 5/25/2022.     Outpatient Physical Therapy 2 times weekly for 8 weeks to include the following interventions: Cervical/Lumbar Traction, Electrical Stimulation  , Manual Therapy, Moist Heat/ Ice, Neuromuscular Re-ed, Patient Education, Self Care, Therapeutic Activities, Therapeutic Exercise and Ultrasound. Interventions can be provided by PTA as a treatment team.    Re-assess and update POC next visit.     Joy Mata, MATHEW   5/19/2022

## 2022-05-24 ENCOUNTER — CLINICAL SUPPORT (OUTPATIENT)
Dept: REHABILITATION | Facility: HOSPITAL | Age: 52
End: 2022-05-24
Payer: COMMERCIAL

## 2022-05-24 DIAGNOSIS — M25.522 LEFT ELBOW PAIN: Primary | ICD-10-CM

## 2022-05-24 PROCEDURE — 97140 MANUAL THERAPY 1/> REGIONS: CPT | Mod: PN

## 2022-05-24 PROCEDURE — 97110 THERAPEUTIC EXERCISES: CPT | Mod: PN

## 2022-05-25 NOTE — PROGRESS NOTES
"  [facility]  Physical Therapy Progress Note     Name: Analia Apodaca  Clinic Number: [id number]    Therapy Diagnosis:   Encounter Diagnosis   Name Primary?    Left elbow pain Yes     Physician: Clara Clark PA-C    Visit Date: 5/24/2022    Physician Orders: PT Eval and Treat  Medical Diagnosis from Referral: Left elbow pain   Evaluation Date: 3/25/2022  Authorization Period Expiration: 12/31/2022   Plan of Care Expiration: 7/8/20222  Progress Note Due: 7/8/2022  Visit # / Visits authorized: 12/ 20   FOTO: 2/3     Precautions: Standard     Visit #: 12 of 20  PTA Visit #: 0  Time In: 8:45  Time Out: 9:30  Total Billable Time: 45 minutes (45 minutes billable)    Subjective     Pt reports: she continues to have difficulty sleeping due to her L shoulder pain. She tries laying on her R side with her L arm supported on a body pillow but she continues to have pain.  She was compliant with home exercise program.  Response to previous treatment: no concerns   Functional change: continued intermittent pain of shoulder and mid humerus    Pain: 3/10  Location: left shoulder and mid-humerus       Objective     Analia received therapeutic exercises to develop strength, endurance, ROM, flexibility and posture for 0 minutes including:  Shoulder reverse rolls x 20  Shoulder forward rolls x 20   Scapular retraction x 20   Pendulum 20x each way   Physio ball flexion on mat x 10 (thumbs face towards ceiling)  Isometric shoulder flex, IR, and ER x 10 each way   Median nerve glide decompression x 10  Median nerve glide with elbow and wrist flex in standing (Can do this in supine as well) x 10   Pectoralis stretch (corner stretch)  5x10" sec holds   Open book thoracic stretch on mat x 10  Pulleys in Left shoulder IR x 10  Left upper trap stretch x 5x, 10 sec holds   AAROM of B) shoulder flexion with cane, x10  AROM of standing Is, Ys, Ts up to 90 degrees, x5 with 1lb to left wrap around wrist   Sleeper's " stretch on L shoulder, x10  Shoulder ER brugger with GTB 2x10  Standing shoulder ER and IR with YTB 2x10  Standing rows with GTB x 20  Prone shoulder extension with scapular retraction 2 x 10  Chair thoracic stretch with towel roll behind upper back x 15  Cable lats pull down with 3lb each arms x10 (go out in scaption position)  Standing shoulder IR with palms face forward and shoulder ER with palms roll out to side 2 x 10  Standing W scapular retraction 2 x 10  Chin tucks 2 x 10    Analia received the following manual therapy techniques: Joint mobilizations and Soft tissue Mobilization were applied to the: Left shoulder for 38 minutes, including:  PROM stretch in flexion, abduction, horizontal adduction  Shoulder distraction to improve shoulder ROM   Shoulder posterior capsule glide grade 2   Soft tissue mobilization to AC joint for increase flexibility and ROM in shoulder   Soft tissue mobilization to deltoids, triceps, and biceps  Soft tissue mobilization of upper and mid trapezius muscles    Home Exercises Provided and Patient Education Provided     Written Home Exercises Provided: yes.  Exercises were reviewed and Analia was able to demonstrate them prior to the end of the session.  Analia demonstrated good  understanding of the education provided.     Education provided:   - HEP and importance of compliance, Role of PT, Posterior capsule stretches to provide relief of L shoulder pain.   - Sleeping position for comfort and reduce Left shoulder pain. Use pillows between knees and back to reduce Right sided hip pain.     Assessment     Patient still has pain to Left mid humerus and limited shoulder abduction with elbow flexed. However her overall shoulder ROM and strength is getting better. Patient appeared to show increase ROM after manual therapy with shoulder abduction and elbow flexed going from approximately 70 to 90 degrees range. Continue working on postural control and awareness and joint  capsule/rotator cuff tendon mobility especially into external rotation with arm abducted 90 degrees. Patient would continue to benefit from PT to address these deficits in order for patient to perform her daily routine without pain. Patient will continue to be progressed as tolerated.    Analia Is progressing towards her goals.   Pt prognosis is Good.     Pt will continue to benefit from skilled outpatient physical therapy to address the deficits listed in the problem list box on initial evaluation, provide pt/family education and to maximize pt's level of independence in the home and community environment.     Pt's spiritual, cultural and educational needs considered and pt agreeable to plan of care and goals.     Anticipated barriers to physical therapy: None     Goals:   Short Term Goals: 4 weeks   1. Patient will demonstrate independence in prescribed home exercise program once a day with proper form, sets and reps to improve initiation, strength and endurance of muscles needed to return to prior level of function Met 4/25/2022  2. Patient will demonstrate shoulder flexion active range of motion greater or equal to 150 degrees to be able to reach into overhead cabinets and dress him/herself for painfree activities of daily living Ongoing 5/24/2022  3. Patient will achieve upper extremity strength 5/5 to be able to lift 10 pound grocery bag painfree Ongoing 5/24/2022     Long Term Goals: 8 weeks   1. Patient will achieve shoulder functional external rotation to T2 to allow ease with washing hair and neck Ongoing 5/24/2022  2. Patient will achieve functional internal rotation to T8 to allow ease with washing back Ongoing 5/24/2022  3. Patient will demonstrate cross body adduction with L UE to posterior aspect R shoulder to allow patient to pull up bra straps without difficulty. Met, with pain 4/25/2022    Plan     Plan of care Certification: 5/25/2022 - 7/8/2022     Outpatient Physical Therapy 2 times weekly for  6 weeks to include the following interventions: Cervical/Lumbar Traction, Electrical Stimulation  , Manual Therapy, Moist Heat/ Ice, Neuromuscular Re-ed, Patient Education, Self Care, Therapeutic Activities, Therapeutic Exercise and Ultrasound. Interventions can be provided by PTA as a treatment team.    Alesha Arrington, PT, DPT  5/25/2022   [facility]                                 Physical Therapy       PT/PTA met face to face to discuss pt's treatment plan and progress towards established goals. Pt will be seen by a physical therapist minimally every 6th visit or every 30 days.    Please see Updated Plan of Care dated 5/25/2022 for changes and updated goals.    Joy Mata, PTA

## 2022-05-31 ENCOUNTER — CLINICAL SUPPORT (OUTPATIENT)
Dept: REHABILITATION | Facility: HOSPITAL | Age: 52
End: 2022-05-31
Payer: COMMERCIAL

## 2022-05-31 DIAGNOSIS — M25.522 LEFT ELBOW PAIN: Primary | ICD-10-CM

## 2022-05-31 PROCEDURE — 97110 THERAPEUTIC EXERCISES: CPT | Mod: PN,CQ

## 2022-05-31 PROCEDURE — 97140 MANUAL THERAPY 1/> REGIONS: CPT | Mod: PN,CQ

## 2022-05-31 RX ORDER — NALTREXONE HYDROCHLORIDE AND BUPROPION HYDROCHLORIDE 8; 90 MG/1; MG/1
TABLET, EXTENDED RELEASE ORAL
Qty: 120 TABLET | Refills: 0 | Status: SHIPPED | OUTPATIENT
Start: 2022-05-31 | End: 2023-07-06

## 2022-05-31 NOTE — PROGRESS NOTES
[facility]  Physical Therapy Progress Note     Name: Analia Apodaca  Clinic Number: [id number]    Therapy Diagnosis:   Encounter Diagnosis   Name Primary?    Left elbow pain Yes     Physician: Clara Clark PA-C    Visit Date: 5/31/2022    Physician Orders: PT Eval and Treat  Medical Diagnosis from Referral: Left elbow pain   Evaluation Date: 3/25/2022  Authorization Period Expiration: 12/31/2022   Plan of Care Expiration: 7/8/20222  Progress Note Due: 7/8/2022  Visit # / Visits authorized: 13/ 20   FOTO: 2/3     Precautions: Standard     Visit #: 13 of 20  PTA Visit #: 1  Time In: 4:00pm  Time Out: 4:40pm  Total Billable Time: 35 minutes billable    Subjective     Pt reports: of pain to distal humerus today near the insertion of biceps tendon and superior supination forearm muscles.    She was compliant with home exercise program.  Response to previous treatment: no concerns   Functional change: continued intermittent pain of shoulder, mid humerus, and elbow    Pain: 3/10 at rest, 10/10 with palpation at elbow in supination   Location: left shoulder and elbow      Objective     Analia received therapeutic exercises to develop strength, endurance, ROM, flexibility and posture for 20 minutes including:  Standing shoulder with arms rolling out in ER and retraction x 15  Standing shoulder circles against the wall x 20 in CW/CCW  Standing shoulder ABCs x 1  Standing shoulder ER and ABD at 90 degrees x 15  Seated wrist extension and flexion x 20   UBE at L1 for 3 minutes for endurance, ROM, and strength    Analia received the following manual therapy techniques: Joint mobilizations and Soft tissue Mobilization were applied to the: Left shoulder for 15 minutes, including:  PROM stretch in flexion, abduction, ER and IR  Left shoulder distraction grade 2   Soft tissue mobilization to L arm for decrease pain and decrease muscle tension using biofreezes     Home Exercises Provided and Patient  Education Provided     Written Home Exercises Provided: yes.  Exercises were reviewed and Analia was able to demonstrate them prior to the end of the session.  Analia demonstrated good  understanding of the education provided.     Education provided:   - HEP and importance of compliance, Role of PT, Posterior capsule stretches to provide relief of L shoulder pain.   - Sleeping position for comfort and reduce Left shoulder pain. Use pillows between knees and back to reduce Right sided hip pain.     Assessment   Patient presents with pain at the distal humerus muscles attachments and superior forearm today. Noted patient has pain in supination motion and mild discomforts in extensors muscles during wrist extension exercise. Provided manual therapy for decrease pain and to improve shoulder/elbow joints range of motion in pain-free movements. Added forearm exercises and shoulder strengthening exercises today with patient reporting no major of increase pain during or after therapy. Instructed patient to add these exercises to her HEP. Patient expressed understanding. Will continue to progress as tolerated.   Analia Is progressing towards her goals.   Pt prognosis is Good.     Pt will continue to benefit from skilled outpatient physical therapy to address the deficits listed in the problem list box on initial evaluation, provide pt/family education and to maximize pt's level of independence in the home and community environment.     Pt's spiritual, cultural and educational needs considered and pt agreeable to plan of care and goals.     Anticipated barriers to physical therapy: None     Goals:   Short Term Goals: 4 weeks   1. Patient will demonstrate independence in prescribed home exercise program once a day with proper form, sets and reps to improve initiation, strength and endurance of muscles needed to return to prior level of function Met 4/25/2022  2. Patient will demonstrate shoulder flexion active range of  motion greater or equal to 150 degrees to be able to reach into overhead cabinets and dress him/herself for painfree activities of daily living Ongoing 5/24/2022  3. Patient will achieve upper extremity strength 5/5 to be able to lift 10 pound grocery bag painfree Ongoing 5/24/2022     Long Term Goals: 8 weeks   1. Patient will achieve shoulder functional external rotation to T2 to allow ease with washing hair and neck Ongoing 5/24/2022  2. Patient will achieve functional internal rotation to T8 to allow ease with washing back Ongoing 5/24/2022  3. Patient will demonstrate cross body adduction with L UE to posterior aspect R shoulder to allow patient to pull up bra straps without difficulty. Met, with pain 4/25/2022    Plan     Plan of care Certification: 5/25/2022 - 7/8/2022     Outpatient Physical Therapy 2 times weekly for 6 weeks to include the following interventions: Cervical/Lumbar Traction, Electrical Stimulation  , Manual Therapy, Moist Heat/ Ice, Neuromuscular Re-ed, Patient Education, Self Care, Therapeutic Activities, Therapeutic Exercise and Ultrasound. Interventions can be provided by MATHEW as a treatment team.    Joy Mata PTA  5/31/2022

## 2022-06-07 ENCOUNTER — CLINICAL SUPPORT (OUTPATIENT)
Dept: REHABILITATION | Facility: HOSPITAL | Age: 52
End: 2022-06-07
Payer: COMMERCIAL

## 2022-06-07 DIAGNOSIS — M25.522 LEFT ELBOW PAIN: Primary | ICD-10-CM

## 2022-06-07 PROCEDURE — 97140 MANUAL THERAPY 1/> REGIONS: CPT | Mod: PN,CQ

## 2022-06-07 PROCEDURE — 97110 THERAPEUTIC EXERCISES: CPT | Mod: PN,CQ

## 2022-06-07 NOTE — PROGRESS NOTES
[facility]  Physical Therapy Progress Note     Name: Analia Apodaca  Clinic Number: [id number]    Therapy Diagnosis:   Encounter Diagnosis   Name Primary?    Left elbow pain Yes     Physician: Clara Clark PA-C    Visit Date: 6/7/2022    Physician Orders: PT Eval and Treat  Medical Diagnosis from Referral: Left elbow pain   Evaluation Date: 3/25/2022  Authorization Period Expiration: 12/31/2022   Plan of Care Expiration: 7/8/20222  Progress Note Due: 7/8/2022  Visit # / Visits authorized: 14/ 20   FOTO: 2/3     Precautions: Standard     Visit #: 14 of 20  PTA Visit #: 2  Time In: 4:00pm  Time Out: 4:45pm  Total Billable Time: 45 minutes billable    Subjective     Pt reports: she still has the same discomforts and pain to Left arm that seems to not go away. Patient reports she still can't use her left arm to reach in the back.   She was compliant with home exercise program.  Response to previous treatment: still has the same pain to Left arm  Functional change: continued intermittent pain of shoulder, mid humerus, and elbow    Pain: 3/10   Location: left shoulder and elbow      Objective     Analia received therapeutic exercises to develop strength, endurance, ROM, flexibility and posture for 30 minutes including:  Standing shoulder with arms rolling out in ER and retraction x 15  Standing shoulder circles against the wall x 20 in CW/CCW  Standing shoulder ABCs x 1  Standing shoulder ER and ABD at 90 degrees x 15  Seated wrist extension and flexion with 2lb x 20   Seated radial deviation with 2lb x 20  Towel twist in wrist flexion and extension x 10 each way  Standing shoulder in I, Y, T above 90 degrees with 2lb each arms x 20 each  Standing shoulder D2 flexion (swords) with RTB x 20   Standing shoulder ER and IR stretch using a towel x 10 each  UBE at L2 for 5 minutes for endurance, ROM, and strength    Analia received the following manual therapy techniques: Joint mobilizations  and Soft tissue Mobilization were applied to the: Left shoulder for 15 minutes, including:  PROM stretch in flexion, abduction, ER and IR  Left shoulder distraction grade 2   Soft tissue mobilization to L arm for decrease pain and decrease muscle tension using biofreezes     Home Exercises Provided and Patient Education Provided     Written Home Exercises Provided: yes.  Exercises were reviewed and Analia was able to demonstrate them prior to the end of the session.  Analia demonstrated good  understanding of the education provided.     Education provided:   - HEP and importance of compliance, Role of PT, Posterior capsule stretches to provide relief of L shoulder pain.   - Sleeping position for comfort and reduce Left shoulder pain. Use pillows between knees and back to reduce Right sided hip pain.     Assessment   Patient continues to presents with Left shoulder/elbow pain that appears to be persistent. I advised patient to take note on what repetitives movements she does at work each day that may cause overusing the Left shoulder or elbow. I instructed patient to let this therapist know what movements at work she finds that she does the most or if the symptoms has decrease at all after today session. Patient expressed understanding. Continued on Left upper extremity strengthening, range of motions, and pain reduction. Patient was able to complete therapeutic exercises without increase in pain and tolerates increase resistance in open chain exercises this visit. Will continue to progress as tolerated.   nAalia Is progressing towards her goals.   Pt prognosis is Good.     Pt will continue to benefit from skilled outpatient physical therapy to address the deficits listed in the problem list box on initial evaluation, provide pt/family education and to maximize pt's level of independence in the home and community environment.     Pt's spiritual, cultural and educational needs considered and pt agreeable to plan of  care and goals.     Anticipated barriers to physical therapy: None     Goals:   Short Term Goals: 4 weeks   1. Patient will demonstrate independence in prescribed home exercise program once a day with proper form, sets and reps to improve initiation, strength and endurance of muscles needed to return to prior level of function Met 4/25/2022  2. Patient will demonstrate shoulder flexion active range of motion greater or equal to 150 degrees to be able to reach into overhead cabinets and dress him/herself for painfree activities of daily living Ongoing 5/24/2022  3. Patient will achieve upper extremity strength 5/5 to be able to lift 10 pound grocery bag painfree Ongoing 5/24/2022     Long Term Goals: 8 weeks   1. Patient will achieve shoulder functional external rotation to T2 to allow ease with washing hair and neck Ongoing 5/24/2022  2. Patient will achieve functional internal rotation to T8 to allow ease with washing back Ongoing 5/24/2022  3. Patient will demonstrate cross body adduction with L UE to posterior aspect R shoulder to allow patient to pull up bra straps without difficulty. Met, with pain 4/25/2022    Plan     Plan of care Certification: 5/25/2022 - 7/8/2022     Outpatient Physical Therapy 2 times weekly for 6 weeks to include the following interventions: Cervical/Lumbar Traction, Electrical Stimulation  , Manual Therapy, Moist Heat/ Ice, Neuromuscular Re-ed, Patient Education, Self Care, Therapeutic Activities, Therapeutic Exercise and Ultrasound. Interventions can be provided by PTA as a treatment team.    Joy Mata PTA  6/7/2022

## 2022-06-14 ENCOUNTER — CLINICAL SUPPORT (OUTPATIENT)
Dept: REHABILITATION | Facility: HOSPITAL | Age: 52
End: 2022-06-14
Payer: COMMERCIAL

## 2022-06-14 DIAGNOSIS — M25.522 LEFT ELBOW PAIN: Primary | ICD-10-CM

## 2022-06-14 PROCEDURE — 97140 MANUAL THERAPY 1/> REGIONS: CPT | Mod: PN,CQ

## 2022-06-14 PROCEDURE — 97110 THERAPEUTIC EXERCISES: CPT | Mod: PN,CQ

## 2022-06-14 NOTE — PROGRESS NOTES
"  [facility]  Physical Therapy Progress Note     Name: Analia Apodaca  Clinic Number: [id number]    Therapy Diagnosis:   Encounter Diagnosis   Name Primary?    Left elbow pain Yes     Physician: Clara Clark PA-C    Visit Date: 6/14/2022    Physician Orders: PT Eval and Treat  Medical Diagnosis from Referral: Left elbow pain   Evaluation Date: 3/25/2022  Authorization Period Expiration: 12/31/2022   Plan of Care Expiration: 7/8/20222  Progress Note Due: 7/8/2022  Visit # / Visits authorized: 15/ 20   FOTO: 2/3     Precautions: Standard     Visit #: 15 of 20  PTA Visit #: 3  Time In: 4:00pm  Time Out: 4:38pm  Total Billable Time: 38 minutes     Subjective     Pt reports: she feels pain at anterior Left shoulder today and this happens out of no where. She still feel pain radiating down to deltoid muscles with horizontal abduction and stiffness to shoulder.   She was compliant with home exercise program.  Response to previous treatment: no changes in symptoms, still has pain/discomforts to left shoulder and arm  Functional change: continued intermittent pain of shoulder, mid humerus, and elbow    Pain: 5/10   Location: left shoulder and elbow      Objective     Analia received therapeutic exercises to develop strength, endurance, ROM, flexibility and posture for 13 minutes including:    Scapular retraction 2 x 10  Standing dowel shoulder IR stretch 10x10" holds   UBE at L2 for 5 minutes for endurance, ROM, and strength    Not performed:   Standing shoulder with arms rolling out in ER and retraction x 15  Standing shoulder circles against the wall x 20 in CW/CCW  Standing shoulder ABCs x 1  Standing shoulder ER and ABD at 90 degrees x 15  Seated wrist extension and flexion with 2lb x 20   Seated radial deviation with 2lb x 20  Towel twist in wrist flexion and extension x 10 each way  Standing shoulder in I, Y, T above 90 degrees with 2lb each arms x 20 each  Standing shoulder D2 flexion " (grupo) with RTB x 20   Standing shoulder ER and IR stretch using a towel x 10 each    Analia received the following manual therapy techniques: Joint mobilizations and Soft tissue Mobilization were applied to the: Left shoulder for 15 minutes, including:  PROM stretch in flexion, abduction, ER and IR  Left shoulder distraction grade 2   Manual AC joint mobilizations  Manual Left scapula mobilization in downward movement  Bony contour to Left clavicle on lateral aspect of bone      Analia participated in dynamic functional therapeutic activities to improve functional performance for 5 minutes, including:  Functional reach overheads placing play balls above 5th shelf     Analia participated in gait training to improve functional mobility and safety for 5 minutes, including:  Ambulating on level tiles in therapy gym holding onto 10lb weight on Left arm.      Home Exercises Provided and Patient Education Provided     Written Home Exercises Provided: yes.  Exercises were reviewed and Analia was able to demonstrate them prior to the end of the session.  Analia demonstrated good  understanding of the education provided.     Education provided:   - HEP and importance of compliance, Role of PT, Posterior capsule stretches to provide relief of L shoulder pain.   - Sleeping position for comfort and reduce Left shoulder pain. Use pillows between knees and back to reduce Right sided hip pain.     Assessment   Patient continues to demonstrate limited shoulder internal rotation, pain in anterior shoulder in resting today, lateral shoulder pain in horizontal abduction, and stiffness. Continue to address acromioclavicular joint and glenohumeral joint mobility this visit as well as postural control. Noted on initial eval, re-assessment on 4/25/2022, and recent re-assess on 5/24/2022 that patient still shown difficulties with shoulder ROM in IR, flexion, and abduction along with pain. However as of late, her pain level has been  better in aspect of more manageable than prior to beginning of therapy care. Patient was able to performed overhead reach to Left shoulder today with no concerns or discomfort. Patient also able to demonstrate carrying 10lb weight on Left arm to achieve upper extremity strength in order to carry grocery bag without pain. Noted patient demonstrate increase shoulder flexibility after manual treatment which indicates continued stiffness to ACJ and GHJ. Therefore will continue to address ACJ/GHJ mobility and rotator cuff stability and control.     Analia Is progressing towards her goals.   Pt prognosis is Good.     Pt will continue to benefit from skilled outpatient physical therapy to address the deficits listed in the problem list box on initial evaluation, provide pt/family education and to maximize pt's level of independence in the home and community environment.     Pt's spiritual, cultural and educational needs considered and pt agreeable to plan of care and goals.     Anticipated barriers to physical therapy: None     Goals:   Short Term Goals: 4 weeks   1. Patient will demonstrate independence in prescribed home exercise program once a day with proper form, sets and reps to improve initiation, strength and endurance of muscles needed to return to prior level of function Met 4/25/2022  2. Patient will demonstrate shoulder flexion active range of motion greater or equal to 150 degrees to be able to reach into overhead cabinets and dress him/herself for painfree activities of daily living Ongoing 5/24/2022  3. Patient will achieve upper extremity strength 5/5 to be able to lift 10 pound grocery bag painfree Ongoing 5/24/2022     Long Term Goals: 8 weeks   1. Patient will achieve shoulder functional external rotation to T2 to allow ease with washing hair and neck Ongoing 5/24/2022  2. Patient will achieve functional internal rotation to T8 to allow ease with washing back Ongoing 5/24/2022  3. Patient will  demonstrate cross body adduction with L UE to posterior aspect R shoulder to allow patient to pull up bra straps without difficulty. Met, with pain 4/25/2022    Plan     Plan of care Certification: 5/25/2022 - 7/8/2022     Outpatient Physical Therapy 2 times weekly for 6 weeks to include the following interventions: Cervical/Lumbar Traction, Electrical Stimulation  , Manual Therapy, Moist Heat/ Ice, Neuromuscular Re-ed, Patient Education, Self Care, Therapeutic Activities, Therapeutic Exercise and Ultrasound. Interventions can be provided by PTA as a treatment team.    Joy Mata, MATHEW  6/14/2022

## 2022-06-21 ENCOUNTER — CLINICAL SUPPORT (OUTPATIENT)
Dept: REHABILITATION | Facility: HOSPITAL | Age: 52
End: 2022-06-21
Payer: COMMERCIAL

## 2022-06-21 DIAGNOSIS — M25.522 LEFT ELBOW PAIN: Primary | ICD-10-CM

## 2022-06-21 PROCEDURE — 97110 THERAPEUTIC EXERCISES: CPT | Mod: PN,CQ

## 2022-06-21 NOTE — PROGRESS NOTES
[facility]  Physical Therapy Progress Note     Name: Analia Apodaca  Clinic Number: [id number]    Therapy Diagnosis:   Encounter Diagnosis   Name Primary?    Left elbow pain Yes     Physician: Clara Clark PA-C    Visit Date: 6/21/2022    Physician Orders: PT Eval and Treat  Medical Diagnosis from Referral: Left elbow pain   Evaluation Date: 3/25/2022  Authorization Period Expiration: 12/31/2022   Plan of Care Expiration: 7/8/20222  Progress Note Due: 7/8/2022  Visit # / Visits authorized: 16/ 20   FOTO: 2/3     Precautions: Standard     Visit #: 16 of 20  PTA Visit #: 4  Time In: 4:13pm (pt arrives late)   Time Out: 4:45pm  Total Billable Time: 30 minutes (25 minutes billable)    Subjective     Pt reports: she just came from a massage therapy place and felt much better in her Left shoulder. Patient states the massage masseur really worked out all the knots in her shoulder and she felt good right now. Patient plans to go get a massage once a month. She also reports her shoulder felt better from last therapy visit.   She was compliant with home exercise program.  Response to previous treatment: no changes in symptoms, still has pain/discomforts to left shoulder and arm    Functional change: improved shoulder and elbow pain     Pain: 0/10   Location: left shoulder and elbow      Objective     Analia received therapeutic exercises to develop strength, endurance, ROM, flexibility and posture for 25 minutes including:    UBE at L2 for 10 minutes for endurance, ROM, and strength  Trampoline ball toss with Red medicine ball throwing overhead with both arms x 20  Trampoline ball toss with Red medicine ball single throw Left arm only x 20  Standing ball toss against wall using a Green medicine ball with elbow at 90 degrees and shoulder abduction at 90 degrees x 70 throws  Standing shoulder extension with GTB and move arms out into ER x 15  Wall serratus protraction and retraction x 10    Not  "performed:   Scapular retraction 2 x 10  Standing dowel shoulder IR stretch 10x10" holds   Standing shoulder with arms rolling out in ER and retraction x 15  Standing shoulder circles against the wall x 20 in CW/CCW  Standing shoulder ABCs x 1  Standing shoulder ER and ABD at 90 degrees x 15  Seated wrist extension and flexion with 2lb x 20   Seated radial deviation with 2lb x 20  Towel twist in wrist flexion and extension x 10 each way  Standing shoulder in I, Y, T above 90 degrees with 2lb each arms x 20 each  Standing shoulder D2 flexion (swords) with RTB x 20   Standing shoulder ER and IR stretch using a towel x 10 each    Analia received the following manual therapy techniques: Joint mobilizations and Soft tissue Mobilization were applied to the: Left shoulder for 0 minutes, including:  PROM stretch in flexion, abduction, ER and IR  Left shoulder distraction grade 2   Manual AC joint mobilizations  Manual Left scapula mobilization in downward movement  Bony contour to Left clavicle on lateral aspect of bone    Analia participated in dynamic functional therapeutic activities to improve functional performance for 0 minutes, including:  Functional reach overheads placing play balls above 5th shelf     Analia participated in gait training to improve functional mobility and safety for 0 minutes, including:  Ambulating on level tiles in therapy gym holding onto 10lb weight on Left arm.    Patient received CP x 5 minutes to Left shoulder post therapy for decrease pain and inflammation.     Home Exercises Provided and Patient Education Provided     Written Home Exercises Provided: yes.  Exercises were reviewed and Analia was able to demonstrate them prior to the end of the session.  Analia demonstrated good  understanding of the education provided.     Education provided:   - HEP and importance of compliance, Role of PT, Posterior capsule stretches to provide relief of L shoulder pain.   - Sleeping position for " comfort and reduce Left shoulder pain. Use pillows between knees and back to reduce Right sided hip pain.     Assessment   Patient arrived therapy clinic with no pain to Left shoulder or elbow today. Short therapy session this visit due to patient just arrived to therapy from the massage place which patient states she had great pain and discomfort relief from the masseur. Focussed session strengthening, stabilization, flexibility, and endurance. Added a few new dynamic stabilization exercises such as trampoline ball toss. Patient did well with this tasks. However noted slight discomforts to anterior shoulder in standing shoulder extension with external rotation using a green theraband exercise. Ended session on ice to Left shoulder for decrease pain and inflammation. Patient still stiff in shoulder internal rotation. Therefore will continue to work on internal rotation stretches and continue working on PT goals. At this time, Analia Is progressing well towards her goals.   Pt prognosis is Good.     Pt will continue to benefit from skilled outpatient physical therapy to address the deficits listed in the problem list box on initial evaluation, provide pt/family education and to maximize pt's level of independence in the home and community environment.     Pt's spiritual, cultural and educational needs considered and pt agreeable to plan of care and goals.     Anticipated barriers to physical therapy: None     Goals:   Short Term Goals: 4 weeks   1. Patient will demonstrate independence in prescribed home exercise program once a day with proper form, sets and reps to improve initiation, strength and endurance of muscles needed to return to prior level of function Met 4/25/2022  2. Patient will demonstrate shoulder flexion active range of motion greater or equal to 150 degrees to be able to reach into overhead cabinets and dress him/herself for painfree activities of daily living Ongoing 5/24/2022  3. Patient will  achieve upper extremity strength 5/5 to be able to lift 10 pound grocery bag painfree Ongoing 5/24/2022     Long Term Goals: 8 weeks   1. Patient will achieve shoulder functional external rotation to T2 to allow ease with washing hair and neck Ongoing 5/24/2022  2. Patient will achieve functional internal rotation to T8 to allow ease with washing back Ongoing 5/24/2022  3. Patient will demonstrate cross body adduction with L UE to posterior aspect R shoulder to allow patient to pull up bra straps without difficulty. Met, with pain 4/25/2022    Plan     Plan of care Certification: 5/25/2022 - 7/8/2022     Outpatient Physical Therapy 2 times weekly for 6 weeks to include the following interventions: Cervical/Lumbar Traction, Electrical Stimulation  , Manual Therapy, Moist Heat/ Ice, Neuromuscular Re-ed, Patient Education, Self Care, Therapeutic Activities, Therapeutic Exercise and Ultrasound. Interventions can be provided by PTA as a treatment team.    Joy Mata PTA  6/21/2022

## 2022-06-22 NOTE — TELEPHONE ENCOUNTER
Explained findings to patient.  The patient expressed understanding.  The patient is scheduled for her Diagnostic mammogram and/or Breast Ultrasound.  Diagnostic Appointment Date/time Jan. 21 at 7:50am.    A letter is being mailed to patient explaining findings and recommendations.     I>O

## 2022-06-28 ENCOUNTER — CLINICAL SUPPORT (OUTPATIENT)
Dept: REHABILITATION | Facility: HOSPITAL | Age: 52
End: 2022-06-28
Payer: COMMERCIAL

## 2022-06-28 DIAGNOSIS — M25.522 LEFT ELBOW PAIN: Primary | ICD-10-CM

## 2022-06-28 PROCEDURE — 97110 THERAPEUTIC EXERCISES: CPT | Mod: PN,CQ

## 2022-07-05 ENCOUNTER — CLINICAL SUPPORT (OUTPATIENT)
Dept: REHABILITATION | Facility: HOSPITAL | Age: 52
End: 2022-07-05
Payer: COMMERCIAL

## 2022-07-05 DIAGNOSIS — M25.522 LEFT ELBOW PAIN: Primary | ICD-10-CM

## 2022-07-05 PROCEDURE — 97110 THERAPEUTIC EXERCISES: CPT | Mod: PN

## 2022-07-05 NOTE — PLAN OF CARE
[facility]  Discharge Note    Name: Analia Apodaca  Clinic Number: [id number]    Therapy Diagnosis:   Encounter Diagnosis   Name Primary?    Left elbow pain Yes     Physician: Clara Clark PA-C    Physician Orders: PT Eval and Treat  Medical Diagnosis from Referral: Left elbow pain   Evaluation Date: 3/25/2022    Date of Last visit: 7/5/2022  Total Visits Received: 18 visits + 1 evaluation    Time In: 4:10 PM  Time Out: 4:25 PM  Total Billable Time: 15 minutes    SUBJECTIVE     Update: patient arrives to her treatment session today with reports of feeling 75% better since her initial evaluation on 3/25/2022. She reports she is able to complete her work duties and ADLs around the home independently with minimal issues. She experiences increased tightness in her L shoulder when performing end-range shoulder flexion and IR, however feels great improvement to her overall L shoulder and elbow mobility. She is greatly appreciative of her therapists for assisting her with her return to prior level of function and feels she is ready to be discharged today.     OBJECTIVE     Completed progress note today to determine patient's discharge status. Updated findings are found in red below:    Upper Extremity Range of Motion (ROM)  Right  Initial Eval  AROM Pain  A/PROM UE ROM Left  Initial Eval  A/PROM Pain  A/PROM 4/25/2022 7/5/2022   145 n Shoulder flexion 134/ passively hard end feel 100 deg superior aspect shoulder y mid to end range 124, painful 175, with report of increased tightness towards end-range with no pain   165 n Shoulder abduction          Functional ER: T2 n Shoulder external rotation Functional ER: T1 y less than internal rotation  T1, painful T2, no pain   Functional IR: T8 n Shoulder internal rotation Functional IR: L5 y during motion L1, painful T12, no pain; report of increased tightness towards end-range       Elbow flexion              Elbow extension              Wrist  "flexion              Wrist extension          All fields left blank intentionally and signify "not tested"     Upper Extremity Strength MMT  /5  Right  Initial Eval Pain   Left  Initial Eval Pain 4/25/2022 7/5/2022   5 n Shoulder flexion 5- n 5 5   5 n Shoulder abduction 5- n      5 n Elbow flexion 5- n 5 5   5 n Elbow extension 5- n 5 5   5 n Shoulder external rotation 5- n 4+, painful 5, no pain   5 n Shoulder internal rotation 5- n 5- 5       Wrist flexion              Wrist extension          All fields left blank intentionally and signify "not tested"    Neurofascial dynamics evaluation  - ULNTT1 (median): (+) arm adducted > 4/25/2022: (+) arm abducted 70 degrees > 7/5/2022: (-) with report of "pulling" in elbow but no nerve symptoms    Treatment      Analia received the treatments listed below:      Therapeutic exercises to develop strength, ROM and flexibility for 15 minutes including:    Re-assessment of objective measures and goals of POC    ASSESSMENT      Completed progress note today per patient's POC timeline. At this time, Analia has demonstrated continuous progression to her L shoulder and elbow mobility since her previous progress note. Subjectively, patient reports improved ability to complete work duties and ADLs around her home, with only experiencing increased symptoms with end-range L shoulder flexion and IR. Objectively, patient demonstrates improvement to her overall LUE strength via improved MMT scores, as well as AROM via improved ROM ranges. Analia has met 3/3 STGs and 3/3 LTGs, making her a candidate for discharge. Discussion held on current medical status and ability to be discharge today based on updated objective measures and goal achievements. Discussion also held on patient's ability to return to the clinic as needed in the near future. Patient verbalized understanding and agreed to be discharged today. Plan to discharge patient from skilled therapeutic intervention for the " following reasons:     Discharge reason: Patient has completed the physician's prescription and patient has met all of his/her goals.    Short Term Goals: 4 weeks   1. Patient will demonstrate independence in prescribed home exercise program once a day with proper form, sets and reps to improve initiation, strength and endurance of muscles needed to return to prior level of function Met 4/25/2022  2. Patient will demonstrate shoulder flexion active range of motion greater or equal to 150 degrees to be able to reach into overhead cabinets and dress him/herself for painfree activities of daily living Met 7/5/2022  3. Patient will achieve upper extremity strength 5/5 to be able to lift 10 pound grocery bag painfree Met 7/5/2022     Long Term Goals: 8 weeks   1. Patient will achieve shoulder functional external rotation to T2 to allow ease with washing hair and neck Met 7/5/2022.   2. Patient will achieve functional internal rotation to T8 to allow ease with washing back  Achieved level T12; however patient reports ability to perform functional IR with LUE with no increase to pain levels. Thus, patient met goal 7/5/2022.  3. Patient will demonstrate cross body adduction with L UE to posterior aspect R shoulder to allow patient to pull up bra straps without difficulty. Met, with no pain 7/5/2022      PLAN     This patient is discharged from Physical Therapy.    Loly Forte, PT, DPT

## 2022-07-12 ENCOUNTER — HOSPITAL ENCOUNTER (OUTPATIENT)
Dept: RADIOLOGY | Facility: HOSPITAL | Age: 52
Discharge: HOME | End: 2022-07-12
Attending: NURSE PRACTITIONER
Payer: COMMERCIAL

## 2022-07-12 ENCOUNTER — OFFICE VISIT (OUTPATIENT)
Dept: OBSTETRICS AND GYNECOLOGY | Facility: CLINIC | Age: 52
End: 2022-07-12
Payer: COMMERCIAL

## 2022-07-12 VITALS
HEIGHT: 66 IN | HEART RATE: 68 BPM | DIASTOLIC BLOOD PRESSURE: 70 MMHG | BODY MASS INDEX: 36.21 KG/M2 | SYSTOLIC BLOOD PRESSURE: 122 MMHG | WEIGHT: 225.31 LBS

## 2022-07-12 VITALS — BODY MASS INDEX: 35.36 KG/M2 | HEIGHT: 66 IN | WEIGHT: 220 LBS

## 2022-07-12 DIAGNOSIS — Z12.31 OTHER SCREENING MAMMOGRAM: ICD-10-CM

## 2022-07-12 DIAGNOSIS — Z78.0 POSTMENOPAUSAL STATE: ICD-10-CM

## 2022-07-12 DIAGNOSIS — Z01.419 ENCOUNTER FOR GYNECOLOGICAL EXAMINATION WITHOUT ABNORMAL FINDING: Primary | ICD-10-CM

## 2022-07-12 PROCEDURE — 99999 PR PBB SHADOW E&M-EST. PATIENT-LVL III: ICD-10-PCS | Mod: PBBFAC,,, | Performed by: OBSTETRICS & GYNECOLOGY

## 2022-07-12 PROCEDURE — 77063 BREAST TOMOSYNTHESIS BI: CPT | Mod: TC

## 2022-07-12 PROCEDURE — 1159F MED LIST DOCD IN RCRD: CPT | Mod: CPTII,S$GLB,, | Performed by: OBSTETRICS & GYNECOLOGY

## 2022-07-12 PROCEDURE — 3078F PR MOST RECENT DIASTOLIC BLOOD PRESSURE < 80 MM HG: ICD-10-PCS | Mod: CPTII,S$GLB,, | Performed by: OBSTETRICS & GYNECOLOGY

## 2022-07-12 PROCEDURE — 99396 PREV VISIT EST AGE 40-64: CPT | Mod: S$GLB,,, | Performed by: OBSTETRICS & GYNECOLOGY

## 2022-07-12 PROCEDURE — 4010F ACE/ARB THERAPY RXD/TAKEN: CPT | Mod: CPTII,S$GLB,, | Performed by: OBSTETRICS & GYNECOLOGY

## 2022-07-12 PROCEDURE — 4010F PR ACE/ARB THEARPY RXD/TAKEN: ICD-10-PCS | Mod: CPTII,S$GLB,, | Performed by: OBSTETRICS & GYNECOLOGY

## 2022-07-12 PROCEDURE — 3074F PR MOST RECENT SYSTOLIC BLOOD PRESSURE < 130 MM HG: ICD-10-PCS | Mod: CPTII,S$GLB,, | Performed by: OBSTETRICS & GYNECOLOGY

## 2022-07-12 PROCEDURE — 1160F RVW MEDS BY RX/DR IN RCRD: CPT | Mod: CPTII,S$GLB,, | Performed by: OBSTETRICS & GYNECOLOGY

## 2022-07-12 PROCEDURE — 1159F PR MEDICATION LIST DOCUMENTED IN MEDICAL RECORD: ICD-10-PCS | Mod: CPTII,S$GLB,, | Performed by: OBSTETRICS & GYNECOLOGY

## 2022-07-12 PROCEDURE — 3008F PR BODY MASS INDEX (BMI) DOCUMENTED: ICD-10-PCS | Mod: CPTII,S$GLB,, | Performed by: OBSTETRICS & GYNECOLOGY

## 2022-07-12 PROCEDURE — 3078F DIAST BP <80 MM HG: CPT | Mod: CPTII,S$GLB,, | Performed by: OBSTETRICS & GYNECOLOGY

## 2022-07-12 PROCEDURE — 99396 PR PREVENTIVE VISIT,EST,40-64: ICD-10-PCS | Mod: S$GLB,,, | Performed by: OBSTETRICS & GYNECOLOGY

## 2022-07-12 PROCEDURE — 3074F SYST BP LT 130 MM HG: CPT | Mod: CPTII,S$GLB,, | Performed by: OBSTETRICS & GYNECOLOGY

## 2022-07-12 PROCEDURE — 3008F BODY MASS INDEX DOCD: CPT | Mod: CPTII,S$GLB,, | Performed by: OBSTETRICS & GYNECOLOGY

## 2022-07-12 PROCEDURE — 1160F PR REVIEW ALL MEDS BY PRESCRIBER/CLIN PHARMACIST DOCUMENTED: ICD-10-PCS | Mod: CPTII,S$GLB,, | Performed by: OBSTETRICS & GYNECOLOGY

## 2022-07-12 PROCEDURE — 99999 PR PBB SHADOW E&M-EST. PATIENT-LVL III: CPT | Mod: PBBFAC,,, | Performed by: OBSTETRICS & GYNECOLOGY

## 2022-07-12 NOTE — PROGRESS NOTES
Subjective:       Patient ID: Analia Apodaca is a 51 y.o. female.    Chief Complaint:  Well Woman      History of Present Illness  Patient presents for annual exam.  Patient had a mammogram done this morning and has been read as within normal limits.  She is otherwise without gyn complaints.    Menstrual History:  OB History        1    Para   1    Term   1            AB        Living   1       SAB        IAB        Ectopic        Multiple        Live Births   1                Menarche age:  Patient's last menstrual period was 2016.         Review of Systems  Review of Systems   Constitutional: Negative for activity change, appetite change, chills, diaphoresis, fatigue, fever and unexpected weight change.   HENT: Negative for congestion, dental problem, drooling, ear discharge, ear pain, facial swelling, hearing loss, mouth sores, nosebleeds, postnasal drip, rhinorrhea, sinus pressure, sneezing, sore throat, tinnitus, trouble swallowing and voice change.    Eyes: Negative for photophobia, pain, discharge, redness, itching and visual disturbance.   Respiratory: Negative for apnea, cough, choking, chest tightness, shortness of breath, wheezing and stridor.    Cardiovascular: Negative for chest pain, palpitations and leg swelling.   Gastrointestinal: Negative for abdominal distention, abdominal pain, anal bleeding, blood in stool, constipation, diarrhea, nausea, rectal pain and vomiting.   Endocrine: Negative for cold intolerance, heat intolerance, polydipsia, polyphagia and polyuria.   Genitourinary: Negative for decreased urine volume, difficulty urinating, dyspareunia, dysuria, enuresis, flank pain, frequency, genital sores, hematuria, menstrual problem, pelvic pain, urgency, vaginal bleeding, vaginal discharge and vaginal pain.   Musculoskeletal: Negative for arthralgias, back pain, gait problem, joint swelling, myalgias, neck pain and neck stiffness.   Skin: Negative for color change, pallor,  rash and wound.   Allergic/Immunologic: Negative for environmental allergies, food allergies and immunocompromised state.   Neurological: Negative for dizziness, tremors, seizures, syncope, facial asymmetry, speech difficulty, weakness, light-headedness, numbness and headaches.   Hematological: Negative for adenopathy. Does not bruise/bleed easily.   Psychiatric/Behavioral: Negative for agitation, behavioral problems, confusion, decreased concentration, dysphoric mood, hallucinations, self-injury, sleep disturbance and suicidal ideas. The patient is not nervous/anxious and is not hyperactive.            Objective:      Physical Exam  Vitals and nursing note reviewed. Exam conducted with a chaperone present.   Constitutional:       Appearance: She is well-developed.   Neck:      Thyroid: No thyromegaly.   Cardiovascular:      Rate and Rhythm: Normal rate and regular rhythm.   Pulmonary:      Effort: Pulmonary effort is normal.      Breath sounds: Normal breath sounds.   Chest:   Breasts: Breasts are symmetrical.      Right: No inverted nipple, mass, nipple discharge, skin change or tenderness.      Left: No inverted nipple, mass, nipple discharge, skin change or tenderness.       Abdominal:      General: Bowel sounds are normal.      Palpations: Abdomen is soft. There is no mass.      Tenderness: There is no abdominal tenderness.      Hernia: There is no hernia in the left inguinal area or right inguinal area.   Genitourinary:     General: Normal vulva.      Labia:         Right: No rash, tenderness, lesion or injury.         Left: No rash, tenderness, lesion or injury.       Urethra: No prolapse, urethral pain, urethral swelling or urethral lesion.      Vagina: No signs of injury and foreign body. No vaginal discharge, erythema, tenderness, bleeding, lesions or prolapsed vaginal walls.      Cervix: No cervical motion tenderness, discharge, friability, lesion, erythema, cervical bleeding or eversion.      Uterus: Not  deviated, not enlarged, not fixed, not tender and no uterine prolapse.       Adnexa:         Right: No mass, tenderness or fullness.          Left: No mass, tenderness or fullness.        Rectum: No external hemorrhoid.   Musculoskeletal:         General: Normal range of motion.   Lymphadenopathy:      Lower Body: No right inguinal adenopathy. No left inguinal adenopathy.   Skin:     General: Skin is dry.   Neurological:      Mental Status: She is alert and oriented to person, place, and time.      Deep Tendon Reflexes: Reflexes are normal and symmetric.   Psychiatric:         Behavior: Behavior normal.         Thought Content: Thought content normal.         Judgment: Judgment normal.             Assessment:        1. Encounter for gynecological examination without abnormal finding    2. Postmenopausal state                Plan:         Analia was seen today for well woman.    Diagnoses and all orders for this visit:    Encounter for gynecological examination without abnormal finding    Postmenopausal state

## 2022-10-10 ENCOUNTER — CLINICAL SUPPORT (OUTPATIENT)
Dept: INTERNAL MEDICINE | Facility: CLINIC | Age: 52
End: 2022-10-10
Payer: COMMERCIAL

## 2022-10-10 DIAGNOSIS — D50.9 IRON DEFICIENCY ANEMIA, UNSPECIFIED IRON DEFICIENCY ANEMIA TYPE: ICD-10-CM

## 2022-10-10 DIAGNOSIS — I10 BENIGN ESSENTIAL HTN: Primary | ICD-10-CM

## 2022-10-10 LAB
ALBUMIN SERPL BCP-MCNC: 4.3 G/DL (ref 3.5–5.2)
ALP SERPL-CCNC: 74 U/L (ref 55–135)
ALT SERPL W/O P-5'-P-CCNC: 22 U/L (ref 10–44)
ANION GAP SERPL CALC-SCNC: 10 MMOL/L (ref 8–16)
AST SERPL-CCNC: 25 U/L (ref 10–40)
BASOPHILS # BLD AUTO: 0.08 K/UL (ref 0–0.2)
BASOPHILS NFR BLD: 1.6 % (ref 0–1.9)
BILIRUB SERPL-MCNC: 0.6 MG/DL (ref 0.1–1)
BUN SERPL-MCNC: 12 MG/DL (ref 6–20)
CALCIUM SERPL-MCNC: 10 MG/DL (ref 8.7–10.5)
CHLORIDE SERPL-SCNC: 107 MMOL/L (ref 95–110)
CHOLEST SERPL-MCNC: 130 MG/DL (ref 120–199)
CHOLEST/HDLC SERPL: 2.9 {RATIO} (ref 2–5)
CO2 SERPL-SCNC: 25 MMOL/L (ref 23–29)
CREAT SERPL-MCNC: 0.8 MG/DL (ref 0.5–1.4)
DIFFERENTIAL METHOD: ABNORMAL
EOSINOPHIL # BLD AUTO: 0.3 K/UL (ref 0–0.5)
EOSINOPHIL NFR BLD: 6.4 % (ref 0–8)
ERYTHROCYTE [DISTWIDTH] IN BLOOD BY AUTOMATED COUNT: 14.4 % (ref 11.5–14.5)
EST. GFR  (NO RACE VARIABLE): >60 ML/MIN/1.73 M^2
FERRITIN SERPL-MCNC: 11 NG/ML (ref 20–300)
GLUCOSE SERPL-MCNC: 81 MG/DL (ref 70–110)
HCT VFR BLD AUTO: 41.3 % (ref 37–48.5)
HDLC SERPL-MCNC: 45 MG/DL (ref 40–75)
HDLC SERPL: 34.6 % (ref 20–50)
HGB BLD-MCNC: 12.6 G/DL (ref 12–16)
IMM GRANULOCYTES # BLD AUTO: 0 K/UL (ref 0–0.04)
IMM GRANULOCYTES NFR BLD AUTO: 0 % (ref 0–0.5)
IRON SERPL-MCNC: 64 UG/DL (ref 30–160)
LDLC SERPL CALC-MCNC: 72 MG/DL (ref 63–159)
LYMPHOCYTES # BLD AUTO: 1.8 K/UL (ref 1–4.8)
LYMPHOCYTES NFR BLD: 35.5 % (ref 18–48)
MCH RBC QN AUTO: 25.5 PG (ref 27–31)
MCHC RBC AUTO-ENTMCNC: 30.5 G/DL (ref 32–36)
MCV RBC AUTO: 84 FL (ref 82–98)
MONOCYTES # BLD AUTO: 0.6 K/UL (ref 0.3–1)
MONOCYTES NFR BLD: 12.1 % (ref 4–15)
NEUTROPHILS # BLD AUTO: 2.3 K/UL (ref 1.8–7.7)
NEUTROPHILS NFR BLD: 44.4 % (ref 38–73)
NONHDLC SERPL-MCNC: 85 MG/DL
NRBC BLD-RTO: 0 /100 WBC
PLATELET # BLD AUTO: 246 K/UL (ref 150–450)
PMV BLD AUTO: 11.7 FL (ref 9.2–12.9)
POTASSIUM SERPL-SCNC: 3.8 MMOL/L (ref 3.5–5.1)
PROT SERPL-MCNC: 7.3 G/DL (ref 6–8.4)
RBC # BLD AUTO: 4.94 M/UL (ref 4–5.4)
SATURATED IRON: 17 % (ref 20–50)
SODIUM SERPL-SCNC: 142 MMOL/L (ref 136–145)
TOTAL IRON BINDING CAPACITY: 380 UG/DL (ref 250–450)
TRANSFERRIN SERPL-MCNC: 257 MG/DL (ref 200–375)
TRIGL SERPL-MCNC: 65 MG/DL (ref 30–150)
TSH SERPL DL<=0.005 MIU/L-ACNC: 2.26 UIU/ML (ref 0.4–4)
WBC # BLD AUTO: 5.12 K/UL (ref 3.9–12.7)

## 2022-10-10 PROCEDURE — 80053 COMPREHEN METABOLIC PANEL: CPT | Performed by: NURSE PRACTITIONER

## 2022-10-10 PROCEDURE — 80061 LIPID PANEL: CPT | Performed by: NURSE PRACTITIONER

## 2022-10-10 PROCEDURE — 82728 ASSAY OF FERRITIN: CPT | Performed by: NURSE PRACTITIONER

## 2022-10-10 PROCEDURE — 84443 ASSAY THYROID STIM HORMONE: CPT | Performed by: NURSE PRACTITIONER

## 2022-10-10 PROCEDURE — 36415 COLL VENOUS BLD VENIPUNCTURE: CPT | Performed by: NURSE PRACTITIONER

## 2022-10-10 PROCEDURE — 84466 ASSAY OF TRANSFERRIN: CPT | Performed by: NURSE PRACTITIONER

## 2022-10-10 PROCEDURE — 85025 COMPLETE CBC W/AUTO DIFF WBC: CPT | Performed by: NURSE PRACTITIONER

## 2022-10-25 ENCOUNTER — PATIENT MESSAGE (OUTPATIENT)
Dept: INTERNAL MEDICINE | Facility: CLINIC | Age: 52
End: 2022-10-25
Payer: COMMERCIAL

## 2022-12-13 ENCOUNTER — TELEPHONE (OUTPATIENT)
Dept: INTERNAL MEDICINE | Facility: CLINIC | Age: 52
End: 2022-12-13
Payer: COMMERCIAL

## 2022-12-13 NOTE — TELEPHONE ENCOUNTER
----- Message from Angelita Castaneda MA sent at 2022 10:14 AM CST -----  Analia Apodaca  MRN: [id number]  : 1970  PCP: Shital Loera  Home Phone      [phone]  Work Phone      Not on file.  Mobile          [phone]      MESSAGE:     Patient c/o not feeling well,  chills, HA and cough. Did COVID test that was negative.   Also pulling out melany at home so not sure if she could be inhaling dust and stuff, but not feeling well  Felt feverish last night but did not have a temp.    Patient calling for recommendations or can meds be called in?    Please Advise:  [phone]

## 2022-12-15 ENCOUNTER — HOSPITAL ENCOUNTER (OUTPATIENT)
Dept: RADIOLOGY | Facility: HOSPITAL | Age: 52
Discharge: HOME | End: 2022-12-15
Attending: NURSE PRACTITIONER
Payer: COMMERCIAL

## 2022-12-15 ENCOUNTER — PATIENT MESSAGE (OUTPATIENT)
Dept: INTERNAL MEDICINE | Facility: CLINIC | Age: 52
End: 2022-12-15

## 2022-12-15 ENCOUNTER — OFFICE VISIT (OUTPATIENT)
Dept: INTERNAL MEDICINE | Facility: CLINIC | Age: 52
End: 2022-12-15
Payer: COMMERCIAL

## 2022-12-15 VITALS
DIASTOLIC BLOOD PRESSURE: 62 MMHG | WEIGHT: 196.63 LBS | RESPIRATION RATE: 16 BRPM | HEIGHT: 66 IN | OXYGEN SATURATION: 91 % | SYSTOLIC BLOOD PRESSURE: 96 MMHG | HEART RATE: 76 BPM | BODY MASS INDEX: 31.6 KG/M2

## 2022-12-15 DIAGNOSIS — R05.1 ACUTE COUGH: ICD-10-CM

## 2022-12-15 DIAGNOSIS — R79.81 LOW O2 SATURATION: ICD-10-CM

## 2022-12-15 DIAGNOSIS — R06.02 SOB (SHORTNESS OF BREATH): ICD-10-CM

## 2022-12-15 DIAGNOSIS — R05.1 ACUTE COUGH: Primary | ICD-10-CM

## 2022-12-15 DIAGNOSIS — E66.9 OBESITY (BMI 30.0-34.9): ICD-10-CM

## 2022-12-15 PROBLEM — E66.811 OBESITY (BMI 30.0-34.9): Status: ACTIVE | Noted: 2022-12-15

## 2022-12-15 PROCEDURE — 4010F ACE/ARB THERAPY RXD/TAKEN: CPT | Mod: CPTII,S$GLB,, | Performed by: NURSE PRACTITIONER

## 2022-12-15 PROCEDURE — 71046 X-RAY EXAM CHEST 2 VIEWS: CPT | Mod: TC

## 2022-12-15 PROCEDURE — 71046 X-RAY EXAM CHEST 2 VIEWS: CPT | Mod: 26,,, | Performed by: RADIOLOGY

## 2022-12-15 PROCEDURE — 1160F RVW MEDS BY RX/DR IN RCRD: CPT | Mod: CPTII,S$GLB,, | Performed by: NURSE PRACTITIONER

## 2022-12-15 PROCEDURE — 3074F SYST BP LT 130 MM HG: CPT | Mod: CPTII,S$GLB,, | Performed by: NURSE PRACTITIONER

## 2022-12-15 PROCEDURE — 3074F PR MOST RECENT SYSTOLIC BLOOD PRESSURE < 130 MM HG: ICD-10-PCS | Mod: CPTII,S$GLB,, | Performed by: NURSE PRACTITIONER

## 2022-12-15 PROCEDURE — 99999 PR PBB SHADOW E&M-EST. PATIENT-LVL IV: ICD-10-PCS | Mod: PBBFAC,,, | Performed by: NURSE PRACTITIONER

## 2022-12-15 PROCEDURE — 99999 PR PBB SHADOW E&M-EST. PATIENT-LVL IV: CPT | Mod: PBBFAC,,, | Performed by: NURSE PRACTITIONER

## 2022-12-15 PROCEDURE — 3078F DIAST BP <80 MM HG: CPT | Mod: CPTII,S$GLB,, | Performed by: NURSE PRACTITIONER

## 2022-12-15 PROCEDURE — 3008F PR BODY MASS INDEX (BMI) DOCUMENTED: ICD-10-PCS | Mod: CPTII,S$GLB,, | Performed by: NURSE PRACTITIONER

## 2022-12-15 PROCEDURE — 4010F PR ACE/ARB THEARPY RXD/TAKEN: ICD-10-PCS | Mod: CPTII,S$GLB,, | Performed by: NURSE PRACTITIONER

## 2022-12-15 PROCEDURE — 96372 THER/PROPH/DIAG INJ SC/IM: CPT | Mod: S$GLB,,, | Performed by: NURSE PRACTITIONER

## 2022-12-15 PROCEDURE — 96372 PR INJECTION,THERAP/PROPH/DIAG2ST, IM OR SUBCUT: ICD-10-PCS | Mod: S$GLB,,, | Performed by: NURSE PRACTITIONER

## 2022-12-15 PROCEDURE — 99214 PR OFFICE/OUTPT VISIT, EST, LEVL IV, 30-39 MIN: ICD-10-PCS | Mod: 25,S$GLB,, | Performed by: NURSE PRACTITIONER

## 2022-12-15 PROCEDURE — 99214 OFFICE O/P EST MOD 30 MIN: CPT | Mod: 25,S$GLB,, | Performed by: NURSE PRACTITIONER

## 2022-12-15 PROCEDURE — 71046 XR CHEST PA AND LATERAL: ICD-10-PCS | Mod: 26,,, | Performed by: RADIOLOGY

## 2022-12-15 PROCEDURE — 3078F PR MOST RECENT DIASTOLIC BLOOD PRESSURE < 80 MM HG: ICD-10-PCS | Mod: CPTII,S$GLB,, | Performed by: NURSE PRACTITIONER

## 2022-12-15 PROCEDURE — 1160F PR REVIEW ALL MEDS BY PRESCRIBER/CLIN PHARMACIST DOCUMENTED: ICD-10-PCS | Mod: CPTII,S$GLB,, | Performed by: NURSE PRACTITIONER

## 2022-12-15 PROCEDURE — 1159F PR MEDICATION LIST DOCUMENTED IN MEDICAL RECORD: ICD-10-PCS | Mod: CPTII,S$GLB,, | Performed by: NURSE PRACTITIONER

## 2022-12-15 PROCEDURE — 1159F MED LIST DOCD IN RCRD: CPT | Mod: CPTII,S$GLB,, | Performed by: NURSE PRACTITIONER

## 2022-12-15 PROCEDURE — 3008F BODY MASS INDEX DOCD: CPT | Mod: CPTII,S$GLB,, | Performed by: NURSE PRACTITIONER

## 2022-12-15 RX ORDER — ALBUTEROL SULFATE 90 UG/1
2 AEROSOL, METERED RESPIRATORY (INHALATION) EVERY 6 HOURS PRN
Qty: 18 G | Refills: 0 | Status: SHIPPED | OUTPATIENT
Start: 2022-12-15 | End: 2023-12-15

## 2022-12-15 RX ORDER — IRBESARTAN 150 MG/1
1 TABLET ORAL DAILY
COMMUNITY
Start: 2022-05-23 | End: 2023-07-06

## 2022-12-15 RX ORDER — TRIAMCINOLONE ACETONIDE 40 MG/ML
40 INJECTION, SUSPENSION INTRA-ARTICULAR; INTRAMUSCULAR
Status: COMPLETED | OUTPATIENT
Start: 2022-12-15 | End: 2022-12-15

## 2022-12-15 RX ADMIN — TRIAMCINOLONE ACETONIDE 40 MG: 40 INJECTION, SUSPENSION INTRA-ARTICULAR; INTRAMUSCULAR at 08:12

## 2022-12-15 NOTE — PROGRESS NOTES
Subjective:       Patient ID: Analia Apodaca is a 52 y.o. female.    Chief Complaint: Breathing Problem (Patient states she has been having breathing problems since Monday when she started having her floors in her home replaced)    Patient is known, to me and presents with   Chief Complaint   Patient presents with    Breathing Problem     Patient states she has been having breathing problems since Monday when she started having her floors in her home replaced   .  Denies chest pain   Patient has been working on her floors in her home and since then developed sob, dry cough and not feeling well. She had this before when she changed out melany. Does not have an inhaler. Also her blood pressure has been lower since losing weight. No acute distress noted. No fever or chills noted  Breathing Problem  She complains of cough, difficulty breathing and shortness of breath. There is no wheezing. Pertinent negatives include no appetite change, chest pain, ear pain, fever, headaches or postnasal drip.   Review of Systems   Constitutional:  Negative for activity change, appetite change, chills, diaphoresis, fatigue, fever and unexpected weight change.   HENT:  Negative for congestion, ear discharge, ear pain, hearing loss, postnasal drip and tinnitus.    Eyes:  Negative for photophobia, pain and visual disturbance.   Respiratory:  Positive for cough and shortness of breath. Negative for apnea, choking, chest tightness, wheezing and stridor.    Cardiovascular:  Negative for chest pain, palpitations and leg swelling.   Gastrointestinal:  Negative for abdominal distention, abdominal pain, anal bleeding, blood in stool, constipation, diarrhea, nausea, rectal pain and vomiting.   Genitourinary:  Negative for difficulty urinating, dysuria, frequency, hematuria and urgency.   Musculoskeletal:  Negative for arthralgias, back pain, gait problem, joint swelling and neck pain.   Skin: Negative.    Neurological:  Negative for dizziness,  seizures, syncope, weakness, light-headedness, numbness and headaches.   Hematological:  Negative for adenopathy. Does not bruise/bleed easily.   Psychiatric/Behavioral:  Negative for behavioral problems, confusion, dysphoric mood, hallucinations, sleep disturbance and suicidal ideas. The patient is not nervous/anxious.      Objective:      Physical Exam  Constitutional:       General: She is not in acute distress.     Appearance: She is well-developed.   HENT:      Head: Normocephalic and atraumatic.      Right Ear: Tympanic membrane and external ear normal.      Left Ear: Tympanic membrane and external ear normal.      Nose: Nose normal.      Mouth/Throat:      Mouth: Mucous membranes are moist.      Pharynx: No oropharyngeal exudate.   Eyes:      General: No scleral icterus.        Right eye: No discharge.         Left eye: No discharge.      Conjunctiva/sclera: Conjunctivae normal.      Pupils: Pupils are equal, round, and reactive to light.   Neck:      Thyroid: No thyromegaly.      Vascular: No JVD.   Cardiovascular:      Rate and Rhythm: Normal rate and regular rhythm.      Heart sounds: Normal heart sounds. No murmur heard.    No friction rub. No gallop.   Pulmonary:      Effort: Pulmonary effort is normal. No respiratory distress.      Breath sounds: No stridor. Examination of the right-lower field reveals rhonchi. Rhonchi present. No wheezing or rales.       Chest:      Chest wall: No tenderness.   Abdominal:      General: Bowel sounds are normal. There is no distension.      Palpations: Abdomen is soft. There is no mass.      Tenderness: There is no abdominal tenderness. There is no right CVA tenderness, left CVA tenderness, guarding or rebound.      Hernia: No hernia is present.   Musculoskeletal:         General: No swelling, tenderness, deformity or signs of injury. Normal range of motion.      Cervical back: Normal range of motion and neck supple.      Right lower leg: No edema.      Left lower leg:  "No edema.   Lymphadenopathy:      Cervical: No cervical adenopathy.   Skin:     General: Skin is warm and dry.      Capillary Refill: Capillary refill takes less than 2 seconds.      Coloration: Skin is not jaundiced or pale.      Findings: No bruising, erythema, lesion or rash.   Neurological:      General: No focal deficit present.      Mental Status: She is alert and oriented to person, place, and time.      Cranial Nerves: No cranial nerve deficit.      Sensory: No sensory deficit.      Motor: No weakness or abnormal muscle tone.      Coordination: Coordination normal.      Gait: Gait normal.      Deep Tendon Reflexes: Reflexes are normal and symmetric. Reflexes normal.   Psychiatric:         Mood and Affect: Mood normal.         Behavior: Behavior normal.         Thought Content: Thought content normal.         Judgment: Judgment normal.       Assessment:       1. Acute cough    2. SOB (shortness of breath)    3. Low O2 saturation    4. Obesity (BMI 30.0-34.9)          Plan:   1. Acute cough  -     X-Ray Chest PA And Lateral; Future; Expected date: 12/15/2022  -     triamcinolone acetonide injection 40 mg  -     albuterol (PROVENTIL HFA) 90 mcg/actuation inhaler; Inhale 2 puffs into the lungs every 6 (six) hours as needed for Wheezing. Rescue  Dispense: 18 g; Refill: 0    2. SOB (shortness of breath)  -     X-Ray Chest PA And Lateral; Future; Expected date: 12/15/2022  -     triamcinolone acetonide injection 40 mg  -     albuterol (PROVENTIL HFA) 90 mcg/actuation inhaler; Inhale 2 puffs into the lungs every 6 (six) hours as needed for Wheezing. Rescue  Dispense: 18 g; Refill: 0    3. Low O2 saturation  -     X-Ray Chest PA And Lateral; Future; Expected date: 12/15/2022  -     albuterol (PROVENTIL HFA) 90 mcg/actuation inhaler; Inhale 2 puffs into the lungs every 6 (six) hours as needed for Wheezing. Rescue  Dispense: 18 g; Refill: 0    4. Obesity (BMI 30.0-34.9)       "This note will not be shared with the " "patient."  Once I review chest x-ray will determine if needs antibx and also medrol dose pack  Cut blood pressure medication in half since she lost weight and her pressure is low  If no improvement in symptoms will let me know   Also needs to monitor pressure as well   Rtc as scheduled  "

## 2023-04-03 ENCOUNTER — PATIENT MESSAGE (OUTPATIENT)
Dept: ADMINISTRATIVE | Facility: HOSPITAL | Age: 53
End: 2023-04-03
Payer: COMMERCIAL

## 2023-07-06 ENCOUNTER — OFFICE VISIT (OUTPATIENT)
Dept: INTERNAL MEDICINE | Facility: CLINIC | Age: 53
End: 2023-07-06
Payer: COMMERCIAL

## 2023-07-06 ENCOUNTER — PATIENT MESSAGE (OUTPATIENT)
Dept: INTERNAL MEDICINE | Facility: CLINIC | Age: 53
End: 2023-07-06

## 2023-07-06 VITALS
HEART RATE: 67 BPM | DIASTOLIC BLOOD PRESSURE: 78 MMHG | SYSTOLIC BLOOD PRESSURE: 122 MMHG | BODY MASS INDEX: 30.51 KG/M2 | OXYGEN SATURATION: 99 % | WEIGHT: 189.81 LBS | HEIGHT: 66 IN | RESPIRATION RATE: 20 BRPM

## 2023-07-06 DIAGNOSIS — G89.29 CHRONIC LEFT SHOULDER PAIN: Primary | ICD-10-CM

## 2023-07-06 DIAGNOSIS — M25.512 CHRONIC LEFT SHOULDER PAIN: Primary | ICD-10-CM

## 2023-07-06 PROCEDURE — 99213 OFFICE O/P EST LOW 20 MIN: CPT | Mod: S$GLB,,, | Performed by: NURSE PRACTITIONER

## 2023-07-06 PROCEDURE — 1159F MED LIST DOCD IN RCRD: CPT | Mod: CPTII,S$GLB,, | Performed by: NURSE PRACTITIONER

## 2023-07-06 PROCEDURE — 3008F PR BODY MASS INDEX (BMI) DOCUMENTED: ICD-10-PCS | Mod: CPTII,S$GLB,, | Performed by: NURSE PRACTITIONER

## 2023-07-06 PROCEDURE — 99999 PR PBB SHADOW E&M-EST. PATIENT-LVL IV: ICD-10-PCS | Mod: PBBFAC,,, | Performed by: NURSE PRACTITIONER

## 2023-07-06 PROCEDURE — 99999 PR PBB SHADOW E&M-EST. PATIENT-LVL IV: CPT | Mod: PBBFAC,,, | Performed by: NURSE PRACTITIONER

## 2023-07-06 PROCEDURE — 3078F PR MOST RECENT DIASTOLIC BLOOD PRESSURE < 80 MM HG: ICD-10-PCS | Mod: CPTII,S$GLB,, | Performed by: NURSE PRACTITIONER

## 2023-07-06 PROCEDURE — 3078F DIAST BP <80 MM HG: CPT | Mod: CPTII,S$GLB,, | Performed by: NURSE PRACTITIONER

## 2023-07-06 PROCEDURE — 3008F BODY MASS INDEX DOCD: CPT | Mod: CPTII,S$GLB,, | Performed by: NURSE PRACTITIONER

## 2023-07-06 PROCEDURE — 4010F ACE/ARB THERAPY RXD/TAKEN: CPT | Mod: CPTII,S$GLB,, | Performed by: NURSE PRACTITIONER

## 2023-07-06 PROCEDURE — 3074F SYST BP LT 130 MM HG: CPT | Mod: CPTII,S$GLB,, | Performed by: NURSE PRACTITIONER

## 2023-07-06 PROCEDURE — 3074F PR MOST RECENT SYSTOLIC BLOOD PRESSURE < 130 MM HG: ICD-10-PCS | Mod: CPTII,S$GLB,, | Performed by: NURSE PRACTITIONER

## 2023-07-06 PROCEDURE — 1159F PR MEDICATION LIST DOCUMENTED IN MEDICAL RECORD: ICD-10-PCS | Mod: CPTII,S$GLB,, | Performed by: NURSE PRACTITIONER

## 2023-07-06 PROCEDURE — 99213 PR OFFICE/OUTPT VISIT, EST, LEVL III, 20-29 MIN: ICD-10-PCS | Mod: S$GLB,,, | Performed by: NURSE PRACTITIONER

## 2023-07-06 PROCEDURE — 4010F PR ACE/ARB THEARPY RXD/TAKEN: ICD-10-PCS | Mod: CPTII,S$GLB,, | Performed by: NURSE PRACTITIONER

## 2023-07-06 NOTE — PROGRESS NOTES
Subjective:       Patient ID: Analia Apodaca is a 52 y.o. female.    Chief Complaint: Arm Pain (L. Arm/shoulder pain - x few months PS:7)    Patient is known, to me and presents with   Chief Complaint   Patient presents with    Arm Pain     L. Arm/shoulder pain - x few months PS:7   .  Denies chest pain and shortness of breath.  Patient of mine presents with chronic left shoulder pain. In 2019 she had the same issue and had to under go several weeks of PT. Imaging showed tendinitis and possible bursitis of the left shoulder. She states that the pain is the same as last time. She is a  so lifts on heavy items. Also she works out daily and lifts weights. But has not been able to do so due to pain.         EXAMINATION:  XR SHOULDER COMPLETE 2 OR MORE VIEWS LEFT     CLINICAL HISTORY:  Pain in left shoulder     COMPARISON:  No comparison studies are available.     FINDINGS:  Bone alignment is satisfactory.  No fracture or dislocation.  No advanced arthritic change.  Coarse calcification in the soft tissues along the umol head, suggesting some calcific tendonitis and/or bursitis.     IMPRESSION:      No acute bone findings.  Soft tissue calcification, as above.        Electronically signed by: Pillo Priest MD  Date:                                            05/03/2019  Time:                                           15:28           Exam Ended: 05/03/19 15:18 Last            Arm Pain     Review of Systems   Constitutional: Negative.    Respiratory: Negative.     Cardiovascular: Negative.    Musculoskeletal:  Positive for arthralgias. Negative for back pain, gait problem, joint swelling, myalgias, neck pain and neck stiffness.   Skin: Negative.    Neurological: Negative.      Objective:      Physical Exam  Constitutional:       General: She is not in acute distress.     Appearance: Normal appearance. She is obese. She is not ill-appearing, toxic-appearing or diaphoretic.   Cardiovascular:      Rate and  "Rhythm: Normal rate and regular rhythm.      Heart sounds: Normal heart sounds. No murmur heard.  Pulmonary:      Effort: Pulmonary effort is normal. No respiratory distress.      Breath sounds: Normal breath sounds. No stridor. No wheezing, rhonchi or rales.   Chest:      Chest wall: No tenderness.   Musculoskeletal:         General: Tenderness present. No swelling, deformity or signs of injury.      Left shoulder: Tenderness present. Decreased range of motion.        Arms:       Right lower leg: No edema.      Left lower leg: No edema.   Skin:     General: Skin is warm and dry.      Capillary Refill: Capillary refill takes less than 2 seconds.      Coloration: Skin is not jaundiced or pale.      Findings: No bruising, erythema, lesion or rash.   Neurological:      Mental Status: She is alert.       Assessment:       1. Chronic left shoulder pain        Plan:   1. Chronic left shoulder pain  -     Ambulatory referral/consult to Orthopedics; Future; Expected date: 07/13/2023       "This note will not be shared with the patient."  She reports that she would like to see ortho to get to the bottom of this.   Take nsaids for pain relief at this time  Avoid heavy weight lifting   Rtc as scheduled   "

## 2023-07-13 ENCOUNTER — PATIENT MESSAGE (OUTPATIENT)
Dept: INTERNAL MEDICINE | Facility: CLINIC | Age: 53
End: 2023-07-13
Payer: COMMERCIAL

## 2023-07-13 ENCOUNTER — PATIENT MESSAGE (OUTPATIENT)
Dept: OBSTETRICS AND GYNECOLOGY | Facility: CLINIC | Age: 53
End: 2023-07-13
Payer: COMMERCIAL

## 2023-07-13 DIAGNOSIS — M25.512 CHRONIC LEFT SHOULDER PAIN: Primary | ICD-10-CM

## 2023-07-13 DIAGNOSIS — Z12.31 BREAST CANCER SCREENING BY MAMMOGRAM: Primary | ICD-10-CM

## 2023-07-13 DIAGNOSIS — G89.29 CHRONIC LEFT SHOULDER PAIN: Primary | ICD-10-CM

## 2023-07-17 ENCOUNTER — TELEPHONE (OUTPATIENT)
Dept: OBSTETRICS AND GYNECOLOGY | Facility: CLINIC | Age: 53
End: 2023-07-17
Payer: COMMERCIAL

## 2023-07-17 LAB
CHOLEST SERPL-MSCNC: 164 MG/DL (ref 0–200)
CHOLEST/HDLC SERPL: 2.5 {RATIO}
HDLC SERPL-MCNC: 65 MG/DL (ref 35–70)
LDL CHOLESTEROL DIRECT: 86 MG/DL
NON HDL CHOL. (LDL+VLDL): 99
TRIGL SERPL-MCNC: 48 MG/DL (ref 40–160)
VLDL CHOLESTEROL: 10 MG/DL

## 2023-07-17 NOTE — TELEPHONE ENCOUNTER
Patient contacted office to reschedule her mammo appointment due to she could not get anyone to take her place at work. Patient is scheduled to be seen on 08/1/2023 @ 4:15 am. Orders are linked to appointment.

## 2023-07-30 ENCOUNTER — PATIENT MESSAGE (OUTPATIENT)
Dept: INTERNAL MEDICINE | Facility: CLINIC | Age: 53
End: 2023-07-30
Payer: COMMERCIAL

## 2023-08-17 ENCOUNTER — OFFICE VISIT (OUTPATIENT)
Dept: OBSTETRICS AND GYNECOLOGY | Facility: CLINIC | Age: 53
End: 2023-08-17
Payer: COMMERCIAL

## 2023-08-17 VITALS
HEIGHT: 66 IN | HEART RATE: 73 BPM | BODY MASS INDEX: 30.22 KG/M2 | DIASTOLIC BLOOD PRESSURE: 78 MMHG | WEIGHT: 188.06 LBS | SYSTOLIC BLOOD PRESSURE: 118 MMHG

## 2023-08-17 DIAGNOSIS — Z78.0 POSTMENOPAUSAL STATE: Primary | ICD-10-CM

## 2023-08-17 PROCEDURE — 3078F PR MOST RECENT DIASTOLIC BLOOD PRESSURE < 80 MM HG: ICD-10-PCS | Mod: CPTII,S$GLB,, | Performed by: OBSTETRICS & GYNECOLOGY

## 2023-08-17 PROCEDURE — 1159F MED LIST DOCD IN RCRD: CPT | Mod: CPTII,S$GLB,, | Performed by: OBSTETRICS & GYNECOLOGY

## 2023-08-17 PROCEDURE — 99999 PR PBB SHADOW E&M-EST. PATIENT-LVL III: ICD-10-PCS | Mod: PBBFAC,,, | Performed by: OBSTETRICS & GYNECOLOGY

## 2023-08-17 PROCEDURE — 4010F PR ACE/ARB THEARPY RXD/TAKEN: ICD-10-PCS | Mod: CPTII,S$GLB,, | Performed by: OBSTETRICS & GYNECOLOGY

## 2023-08-17 PROCEDURE — 3008F BODY MASS INDEX DOCD: CPT | Mod: CPTII,S$GLB,, | Performed by: OBSTETRICS & GYNECOLOGY

## 2023-08-17 PROCEDURE — 1160F PR REVIEW ALL MEDS BY PRESCRIBER/CLIN PHARMACIST DOCUMENTED: ICD-10-PCS | Mod: CPTII,S$GLB,, | Performed by: OBSTETRICS & GYNECOLOGY

## 2023-08-17 PROCEDURE — 3078F DIAST BP <80 MM HG: CPT | Mod: CPTII,S$GLB,, | Performed by: OBSTETRICS & GYNECOLOGY

## 2023-08-17 PROCEDURE — 4010F ACE/ARB THERAPY RXD/TAKEN: CPT | Mod: CPTII,S$GLB,, | Performed by: OBSTETRICS & GYNECOLOGY

## 2023-08-17 PROCEDURE — 3074F PR MOST RECENT SYSTOLIC BLOOD PRESSURE < 130 MM HG: ICD-10-PCS | Mod: CPTII,S$GLB,, | Performed by: OBSTETRICS & GYNECOLOGY

## 2023-08-17 PROCEDURE — 3074F SYST BP LT 130 MM HG: CPT | Mod: CPTII,S$GLB,, | Performed by: OBSTETRICS & GYNECOLOGY

## 2023-08-17 PROCEDURE — 1160F RVW MEDS BY RX/DR IN RCRD: CPT | Mod: CPTII,S$GLB,, | Performed by: OBSTETRICS & GYNECOLOGY

## 2023-08-17 PROCEDURE — 3008F PR BODY MASS INDEX (BMI) DOCUMENTED: ICD-10-PCS | Mod: CPTII,S$GLB,, | Performed by: OBSTETRICS & GYNECOLOGY

## 2023-08-17 PROCEDURE — 1159F PR MEDICATION LIST DOCUMENTED IN MEDICAL RECORD: ICD-10-PCS | Mod: CPTII,S$GLB,, | Performed by: OBSTETRICS & GYNECOLOGY

## 2023-08-17 PROCEDURE — 99396 PR PREVENTIVE VISIT,EST,40-64: ICD-10-PCS | Mod: S$GLB,,, | Performed by: OBSTETRICS & GYNECOLOGY

## 2023-08-17 PROCEDURE — 99999 PR PBB SHADOW E&M-EST. PATIENT-LVL III: CPT | Mod: PBBFAC,,, | Performed by: OBSTETRICS & GYNECOLOGY

## 2023-08-17 PROCEDURE — 99396 PREV VISIT EST AGE 40-64: CPT | Mod: S$GLB,,, | Performed by: OBSTETRICS & GYNECOLOGY

## 2023-08-17 NOTE — PROGRESS NOTES
Subjective:       Patient ID: Analia Apodaca is a 52 y.o. female.    Chief Complaint:  Annual Exam (Well woman exam, Pt has mmg scheduled 23)      History of Present Illness  Patient presents for annual exam.  Patient is scheduled to have a mammogram done later this month.  She is otherwise without gyn complaints.    Menstrual History:  OB History          1    Para   1    Term   1            AB        Living   1         SAB        IAB        Ectopic        Multiple        Live Births   1                Menarche age:  Patient's last menstrual period was 2016.         Review of Systems  Review of Systems   Constitutional:  Negative for activity change, appetite change, chills, diaphoresis, fatigue, fever and unexpected weight change.   HENT:  Negative for congestion, dental problem, drooling, ear discharge, ear pain, facial swelling, hearing loss, mouth sores, nosebleeds, postnasal drip, rhinorrhea, sinus pressure, sneezing, sore throat, tinnitus, trouble swallowing and voice change.    Eyes:  Negative for photophobia, pain, discharge, redness, itching and visual disturbance.   Respiratory:  Negative for apnea, cough, choking, chest tightness, shortness of breath, wheezing and stridor.    Cardiovascular:  Negative for chest pain, palpitations and leg swelling.   Gastrointestinal:  Negative for abdominal distention, abdominal pain, anal bleeding, blood in stool, constipation, diarrhea, nausea, rectal pain and vomiting.   Endocrine: Negative for cold intolerance, heat intolerance, polydipsia, polyphagia and polyuria.   Genitourinary:  Negative for decreased urine volume, difficulty urinating, dyspareunia, dysuria, enuresis, flank pain, frequency, genital sores, hematuria, menstrual problem, pelvic pain, urgency, vaginal bleeding, vaginal discharge and vaginal pain.   Musculoskeletal:  Negative for arthralgias, back pain, gait problem, joint swelling, myalgias, neck pain and neck stiffness.    Skin:  Negative for color change, pallor, rash and wound.   Allergic/Immunologic: Negative for environmental allergies, food allergies and immunocompromised state.   Neurological:  Negative for dizziness, tremors, seizures, syncope, facial asymmetry, speech difficulty, weakness, light-headedness, numbness and headaches.   Hematological:  Negative for adenopathy. Does not bruise/bleed easily.   Psychiatric/Behavioral:  Negative for agitation, behavioral problems, confusion, decreased concentration, dysphoric mood, hallucinations, self-injury, sleep disturbance and suicidal ideas. The patient is not nervous/anxious and is not hyperactive.          Objective:      Physical Exam  Vitals and nursing note reviewed. Exam conducted with a chaperone present.   Constitutional:       Appearance: She is well-developed.   Neck:      Thyroid: No thyromegaly.   Cardiovascular:      Rate and Rhythm: Normal rate and regular rhythm.   Pulmonary:      Effort: Pulmonary effort is normal.      Breath sounds: Normal breath sounds.   Chest:   Breasts:     Breasts are symmetrical.      Right: No inverted nipple, mass, nipple discharge, skin change or tenderness.      Left: No inverted nipple, mass, nipple discharge, skin change or tenderness.   Abdominal:      General: Bowel sounds are normal.      Palpations: Abdomen is soft. There is no mass.      Tenderness: There is no abdominal tenderness.      Hernia: There is no hernia in the left inguinal area or right inguinal area.   Genitourinary:     General: Normal vulva.      Labia:         Right: No rash, tenderness, lesion or injury.         Left: No rash, tenderness, lesion or injury.       Urethra: No prolapse, urethral pain, urethral swelling or urethral lesion.      Vagina: No signs of injury and foreign body. No vaginal discharge, erythema, tenderness, bleeding, lesions or prolapsed vaginal walls.      Cervix: No cervical motion tenderness, discharge, friability, lesion, erythema,  cervical bleeding or eversion.      Uterus: Not deviated, not enlarged, not fixed, not tender and no uterine prolapse.       Adnexa:         Right: No mass, tenderness or fullness.          Left: No mass, tenderness or fullness.        Rectum: No external hemorrhoid.   Musculoskeletal:         General: Normal range of motion.   Lymphadenopathy:      Lower Body: No right inguinal adenopathy. No left inguinal adenopathy.   Skin:     General: Skin is dry.   Neurological:      Mental Status: She is alert and oriented to person, place, and time.      Deep Tendon Reflexes: Reflexes are normal and symmetric.   Psychiatric:         Behavior: Behavior normal.         Thought Content: Thought content normal.         Judgment: Judgment normal.         Assessment:        1. Postmenopausal state                Plan:         Analia was seen today for annual exam.    Diagnoses and all orders for this visit:    Postmenopausal state

## 2023-08-21 ENCOUNTER — HOSPITAL ENCOUNTER (OUTPATIENT)
Dept: RADIOLOGY | Facility: HOSPITAL | Age: 53
Discharge: HOME | End: 2023-08-21
Attending: OBSTETRICS & GYNECOLOGY
Payer: COMMERCIAL

## 2023-08-21 ENCOUNTER — OFFICE VISIT (OUTPATIENT)
Dept: INTERNAL MEDICINE | Facility: CLINIC | Age: 53
End: 2023-08-21
Payer: COMMERCIAL

## 2023-08-21 VITALS
HEIGHT: 66 IN | OXYGEN SATURATION: 100 % | SYSTOLIC BLOOD PRESSURE: 120 MMHG | RESPIRATION RATE: 16 BRPM | DIASTOLIC BLOOD PRESSURE: 74 MMHG | HEART RATE: 75 BPM | WEIGHT: 192.25 LBS | BODY MASS INDEX: 30.9 KG/M2

## 2023-08-21 DIAGNOSIS — Z12.31 BREAST CANCER SCREENING BY MAMMOGRAM: ICD-10-CM

## 2023-08-21 DIAGNOSIS — M25.531 RIGHT WRIST PAIN: Primary | ICD-10-CM

## 2023-08-21 PROCEDURE — 99213 PR OFFICE/OUTPT VISIT, EST, LEVL III, 20-29 MIN: ICD-10-PCS | Mod: S$GLB,,, | Performed by: NURSE PRACTITIONER

## 2023-08-21 PROCEDURE — 3008F BODY MASS INDEX DOCD: CPT | Mod: CPTII,S$GLB,, | Performed by: NURSE PRACTITIONER

## 2023-08-21 PROCEDURE — 3078F PR MOST RECENT DIASTOLIC BLOOD PRESSURE < 80 MM HG: ICD-10-PCS | Mod: CPTII,S$GLB,, | Performed by: NURSE PRACTITIONER

## 2023-08-21 PROCEDURE — 99213 OFFICE O/P EST LOW 20 MIN: CPT | Mod: S$GLB,,, | Performed by: NURSE PRACTITIONER

## 2023-08-21 PROCEDURE — 3074F SYST BP LT 130 MM HG: CPT | Mod: CPTII,S$GLB,, | Performed by: NURSE PRACTITIONER

## 2023-08-21 PROCEDURE — 4010F PR ACE/ARB THEARPY RXD/TAKEN: ICD-10-PCS | Mod: CPTII,S$GLB,, | Performed by: NURSE PRACTITIONER

## 2023-08-21 PROCEDURE — 77067 SCR MAMMO BI INCL CAD: CPT | Mod: TC

## 2023-08-21 PROCEDURE — 3008F PR BODY MASS INDEX (BMI) DOCUMENTED: ICD-10-PCS | Mod: CPTII,S$GLB,, | Performed by: NURSE PRACTITIONER

## 2023-08-21 PROCEDURE — 99999 PR PBB SHADOW E&M-EST. PATIENT-LVL IV: CPT | Mod: PBBFAC,,, | Performed by: NURSE PRACTITIONER

## 2023-08-21 PROCEDURE — 1159F PR MEDICATION LIST DOCUMENTED IN MEDICAL RECORD: ICD-10-PCS | Mod: CPTII,S$GLB,, | Performed by: NURSE PRACTITIONER

## 2023-08-21 PROCEDURE — 77067 MAMMO DIGITAL SCREENING BILAT WITH TOMO: ICD-10-PCS | Mod: 26,,, | Performed by: RADIOLOGY

## 2023-08-21 PROCEDURE — 4010F ACE/ARB THERAPY RXD/TAKEN: CPT | Mod: CPTII,S$GLB,, | Performed by: NURSE PRACTITIONER

## 2023-08-21 PROCEDURE — 77063 BREAST TOMOSYNTHESIS BI: CPT | Mod: 26,,, | Performed by: RADIOLOGY

## 2023-08-21 PROCEDURE — 77063 MAMMO DIGITAL SCREENING BILAT WITH TOMO: ICD-10-PCS | Mod: 26,,, | Performed by: RADIOLOGY

## 2023-08-21 PROCEDURE — 3078F DIAST BP <80 MM HG: CPT | Mod: CPTII,S$GLB,, | Performed by: NURSE PRACTITIONER

## 2023-08-21 PROCEDURE — 1159F MED LIST DOCD IN RCRD: CPT | Mod: CPTII,S$GLB,, | Performed by: NURSE PRACTITIONER

## 2023-08-21 PROCEDURE — 99999 PR PBB SHADOW E&M-EST. PATIENT-LVL IV: ICD-10-PCS | Mod: PBBFAC,,, | Performed by: NURSE PRACTITIONER

## 2023-08-21 PROCEDURE — 3074F PR MOST RECENT SYSTOLIC BLOOD PRESSURE < 130 MM HG: ICD-10-PCS | Mod: CPTII,S$GLB,, | Performed by: NURSE PRACTITIONER

## 2023-08-21 PROCEDURE — 77067 SCR MAMMO BI INCL CAD: CPT | Mod: 26,,, | Performed by: RADIOLOGY

## 2023-08-21 NOTE — PROGRESS NOTES
Subjective:       Patient ID: Analia Apodaca is a 53 y.o. female.    Chief Complaint: Wrist Pain (Right wrist. Few months)    Patient is known, to me and presents with   Chief Complaint   Patient presents with    Wrist Pain     Right wrist. Few months   .  Denies chest pain and shortness of breath.  Patient presents with above complaints. She is a  and does overuse right wrist. States that she would like to see local OT for the discomfort. Does wear a brace at times. Takes tylenol prn   Wrist Pain   Pertinent negatives include no numbness.     Review of Systems   Constitutional: Negative.    Respiratory: Negative.     Cardiovascular: Negative.    Musculoskeletal:  Positive for arthralgias. Negative for back pain, gait problem, joint swelling, myalgias, neck pain and neck stiffness.   Skin: Negative.    Neurological:  Negative for weakness and numbness.       Objective:      Physical Exam  Constitutional:       General: She is not in acute distress.     Appearance: Normal appearance. She is obese. She is not ill-appearing, toxic-appearing or diaphoretic.   Cardiovascular:      Rate and Rhythm: Normal rate and regular rhythm.      Heart sounds: Normal heart sounds. No murmur heard.  Pulmonary:      Effort: Pulmonary effort is normal. No respiratory distress.      Breath sounds: Normal breath sounds. No stridor. No wheezing, rhonchi or rales.   Musculoskeletal:         General: Tenderness present. No swelling, deformity or signs of injury.      Right wrist: Tenderness present. Decreased range of motion.        Arms:       Right lower leg: No edema.      Left lower leg: No edema.   Skin:     General: Skin is warm and dry.      Capillary Refill: Capillary refill takes less than 2 seconds.      Coloration: Skin is not jaundiced or pale.      Findings: No bruising, erythema, lesion or rash.   Neurological:      General: No focal deficit present.      Mental Status: She is alert and oriented to person, place,  "and time.      Cranial Nerves: No cranial nerve deficit.      Sensory: No sensory deficit.      Motor: No weakness.      Coordination: Coordination normal.      Gait: Gait normal.      Deep Tendon Reflexes: Reflexes normal.         Assessment:       1. Right wrist pain        Plan:   1. Right wrist pain  -     Ambulatory referral/consult to Physical/Occupational Therapy; Future; Expected date: 08/28/2023       "This note will not be shared with the patient."  Start taking aleeve   Wear brace   Will refer to OT  Rtc as scheduled  "

## 2023-09-27 ENCOUNTER — PATIENT MESSAGE (OUTPATIENT)
Dept: INTERNAL MEDICINE | Facility: CLINIC | Age: 53
End: 2023-09-27
Payer: COMMERCIAL

## 2023-10-25 NOTE — PROGRESS NOTES
October 25, 2023       No Recipients    Patient: Matt Goodman   YOB: 1965   Date of Visit: 10/25/2023     Dear MANE Tompkins:       Thank you for referring Matt Goodman to me for evaluation. Below are the relevant portions of my assessment and plan of care.    If you have questions, please do not hesitate to call me. I look forward to following Matt along with you.         Sincerely,        Alanna Gutierrez MD        CC:   No Recipients    Alanna Gutierrez MD  10/25/23 1403  Sign when Signing Visit  Chief Complaint   Patient presents with   • Establish Care     Patient referred per PCP for cardiac work up/HTN. Has strong family hx of heart disease.   • Chest Pain     Having tightness/pressure in chest, lasting minutes. At times pain radiates to shoulders and left arm.   • Heart rate     Has been fluctuates . At times heart feels like it is fluttering and skipping beats.   • Shortness of Breath     Having random episode of shortness breath.   • Dizziness     Has random episodes and will also notice at times when standing up.   • Aspirin     Does not take.        CARDIAC COMPLAINTS  chest pressure/discomfort, dyspnea, Dizziness, and palpitations      Subjective  Matt Goodman is a 58 y.o. male came in today for his initial cardiac evaluation.  He has history of hypertension who has been having chest pain on and off.  The chest pain is a tightness in the chest lasting for few minutes.  It radiates to the shoulders and left arm.  It occurs mostly on exertion and relieved by rest.  He also has been noticing shortness of breath which occurs mostly on exertion especially if the chest pain last for more than few minutes.  He also had episodes of dizziness which occurs mostly when he suddenly gets up.  He never had a syncopal episode.  He had similar problems in the past but the chest discomfort he is describing now is different from what he had before.  He had a negative stress and echo  Subjective:       Patient ID: Analia Apodaca is a 48 y.o. female.    Chief Complaint: Cough (with body aches- x last night)    Patient is known, to me and presents with   Chief Complaint   Patient presents with    Cough     with body aches- x last night   .  Denies chest pain and shortness of breath.  Patient presents with flu like s/s since yesterday.  Complains of discomfort upon deep breathing but mild cough with no mucous production.    HPI  Review of Systems   Constitutional: Positive for chills, fatigue and fever. Negative for activity change, appetite change, diaphoresis and unexpected weight change.   HENT: Positive for congestion and postnasal drip.    Eyes: Negative.    Respiratory: Positive for cough.    Cardiovascular: Negative.    Skin: Negative.    Hematological: Negative.        Objective:      Physical Exam   Constitutional: She appears well-developed and well-nourished. No distress.   HENT:   Head: Normocephalic and atraumatic.   Right Ear: Tympanic membrane mobility is abnormal.   Left Ear: Tympanic membrane mobility is abnormal.   Nose: Mucosal edema present.   Mouth/Throat: No oropharyngeal exudate or posterior oropharyngeal erythema.   Eyes: Conjunctivae are normal. Right eye exhibits no discharge. Left eye exhibits no discharge.   Neck: Normal range of motion. Neck supple. No JVD present. No tracheal deviation present. No thyromegaly present.   Cardiovascular: Normal rate, regular rhythm and normal heart sounds.   No murmur heard.  Pulmonary/Chest: Effort normal and breath sounds normal. She has no wheezes.   Lymphadenopathy:     She has no cervical adenopathy.   Skin: Skin is warm and dry. Capillary refill takes less than 2 seconds. No rash noted. She is not diaphoretic. No erythema. No pallor.       Assessment:       1. Influenza-like syndrome        Plan:   Analia was seen today for cough.    Diagnoses and all orders for this visit:    Influenza-like syndrome  -     POCT Influenza A/B  -   "   X-Ray Chest PA And Lateral; Future  -     oseltamivir (TAMIFLU) 75 MG capsule; Take 1 capsule (75 mg total) by mouth 2 (two) times daily. for 5 days    "This note will not be shared with the patient."  Make sure to keep hydrated  Also take motrin and alternate with tylenol   Will do chest x-ray to rule out any pneumonia  rtc for worsening s/s  " and a nonobstructive disease of the LAD in the past by cardiac catheterization.  He apparently had labs done about a week ago but I do not have the results.  He has history of smoking in the past but quit in 89.  He does have a strong family history of heart disease where both his parents as well as aunt have heart disease    Past Surgical History:   Procedure Laterality Date   • CARDIAC CATHETERIZATION  10/16/2007     40% :LAD. EF 60%   • CARDIOVASCULAR STRESS TEST  07/29/2013    @ [facility]. 6.50 Min.10.1 METS. 92% THR.144/79. EF 61%. Negative   • ECHO - CONVERTED  07/29/2013    @ [facility] EF 65%. Trace MR. RVSP- 21 mmHg       Current Outpatient Medications   Medication Sig Dispense Refill   • amLODIPine (NORVASC) 10 MG tablet Take 1 tablet by mouth Daily.     • busPIRone (BUSPAR) 10 MG tablet Take 1 tablet by mouth 2 (Two) Times a Day.     • omeprazole (priLOSEC) 20 MG capsule Take 1 capsule by mouth Daily.     • sertraline (ZOLOFT) 100 MG tablet Take 1 tablet by mouth Daily.     • traZODone (DESYREL) 50 MG tablet Take 1 tablet by mouth Every Night.     • valsartan-hydrochlorothiazide (DIOVAN-HCT) 320-25 MG per tablet Take 1 tablet by mouth Daily.     • dicyclomine (BENTYL) 10 MG capsule Take 1 capsule by mouth 2 (Two) Times a Day With Meals. 60 capsule 8   • metoprolol succinate XL (Toprol XL) 100 MG 24 hr tablet Take 1 tablet by mouth Daily. 30 tablet 8   • Semaglutide,0.25 or 0.5MG/DOS, (Ozempic, 0.25 or 0.5 MG/DOSE,) 2 MG/1.5ML solution pen-injector Inject  under the skin into the appropriate area as directed 1 (One) Time Per Week.       No current facility-administered medications for this visit.           ALLERGIES:  Patient has no known allergies.    Past Medical History:   Diagnosis Date   • Depressive disorder    • Diabetes mellitus    • Generalized anxiety disorder    • History of back surgery    • History of kidney stones    • Hx of appendectomy    • Hx of cholecystectomy    • Hypertension   "      Social History     Tobacco Use   Smoking Status Former   • Types: Cigarettes   • Quit date:    • Years since quittin.8   • Passive exposure: Past   Smokeless Tobacco Former   • Quit date:           Family History   Problem Relation Age of Onset   • Hypertension Mother    • Heart disease Mother         has stents   • Heart attack Father         had CAGB   • Hypertension Father    • Heart disease Father    • Cancer Father    • Diabetes Father    • Anuerysm Father    • Hypertension Sister    • Hypertension Brother    • Hypertension Brother    • Heart attack Paternal Aunt    • Heart attack Paternal Aunt    • Heart attack Maternal Grandmother 40   • Cancer Paternal Grandmother    • Heart attack Paternal Grandfather        Review of Systems   Constitutional: Negative for decreased appetite and malaise/fatigue.   HENT:  Negative for congestion and sore throat.    Eyes:  Negative for blurred vision, double vision and visual disturbance.   Cardiovascular:  Positive for chest pain, dyspnea on exertion and palpitations.   Respiratory:  Positive for shortness of breath. Negative for snoring.    Endocrine: Negative for cold intolerance and heat intolerance.   Hematologic/Lymphatic: Negative for adenopathy. Does not bruise/bleed easily.   Skin:  Negative for itching, nail changes and skin cancer.   Musculoskeletal:  Negative for arthritis and myalgias.   Gastrointestinal:  Negative for abdominal pain, dysphagia and heartburn.   Genitourinary:  Negative for bladder incontinence and frequency.   Neurological:  Negative for dizziness, seizures and vertigo.   Psychiatric/Behavioral:  Negative for altered mental status.    Allergic/Immunologic: Negative for environmental allergies and hives.     Diabetes- Yes  Thyroid- normal    Objective    /80 (BP Location: Right arm)   Pulse 90   Ht 167.6 cm (66\")   Wt 80.3 kg (177 lb)   BMI 28.57 kg/m²     Vitals and nursing note reviewed.   Constitutional:       " Appearance: Healthy appearance. Not in distress.   Eyes:      Conjunctiva/sclera: Conjunctivae normal.      Pupils: Pupils are equal, round, and reactive to light.   HENT:      Head: Normocephalic.   Pulmonary:      Effort: Pulmonary effort is normal.      Breath sounds: Normal breath sounds.   Cardiovascular:      PMI at left midclavicular line. Normal rate. Regular rhythm.      Murmurs: There is a grade 3/6 high frequency blowing holosystolic murmur at the apex.   Abdominal:      General: Bowel sounds are normal.      Palpations: Abdomen is soft.   Musculoskeletal: Normal range of motion.      Cervical back: Normal range of motion and neck supple. Skin:     General: Skin is warm and dry.   Neurological:      Mental Status: Alert, oriented to person, place, and time and oriented to person, place and time.       ECG 12 Lead    Date/Time: 10/25/2023 2:02 PM  Performed by: Alanna Gutierrez MD    Authorized by: Alanna Gutierrez MD  Previous ECG: no previous ECG available  Rhythm: sinus rhythm  Rate: normal  QRS axis: normal  Other findings: non-specific ST-T wave changes    Clinical impression: non-specific ECG        @ASSESSMENT/PLAN@        Diagnoses and all orders for this visit:    1. Chest pressure (Primary)  -     Stress Test With Myocardial Perfusion One Day; Future  -     High Sensitivity CRP; Future  -     dicyclomine (BENTYL) 10 MG capsule; Take 1 capsule by mouth 2 (Two) Times a Day With Meals.  Dispense: 60 capsule; Refill: 8    2. Primary hypertension  -     metoprolol succinate XL (Toprol XL) 100 MG 24 hr tablet; Take 1 tablet by mouth Daily.  Dispense: 30 tablet; Refill: 8    3. Shortness of breath  -     Adult Transthoracic Echo Complete W/ Cont if Necessary Per Protocol; Future    4. Dizziness  -     US Carotid Bilateral; Future    5. Metabolic syndrome    6. Palpitations  -     metoprolol succinate XL (Toprol XL) 100 MG 24 hr tablet; Take 1 tablet by mouth Daily.  Dispense: 30 tablet; Refill:  8    7. Type 2 diabetes mellitus without complication, without long-term current use of insulin    At baseline his heart rate is upper limit of normal.  His blood pressure is elevated.  His EKG showed normal sinus rhythm, nonspecific ST changes.  His clinical examination reveals a BMI of 29.  He has systolic murmur at the mitral area.    Regarding the chest pressure, it occurs mostly on exertion and his EKG showed nonspecific changes.  I talked to him about the possibility of reflux versus ischemic heart disease.  Given the fact that he had moderate disease of the LAD in the past, need to rule CAD.  I advised him to undergo a stress test to evaluate his functional status, chronotropic response, blood pressure response and to rule out any stress-induced ischemia.  I also advised him to check his CRP level    Regarding his hypertension, it is still elevated.  He is already on Diovan/HCTZ, Norvasc and Toprol-XL.  I increased the dose of Toprol-XL to both control the heart rate and blood pressure.    Regarding his shortness of breath, need to rule out cardiac cause.  I scheduled him to undergo an echocardiogram to evaluate for LVH, LV function, valvular structures and the PA pressure    Regarding his dizziness and lightheadedness, it could be secondary to blood pressure but need to rule out carotid artery disease.  I encouraged him to increase his fluid intake.  Advised him to undergo carotid ultrasound    Regarding his diabetes, he apparently used to be on tablet now on was him back.  I explained to him about the possible side effects.  I added Bentyl 10 mg twice a day to reduce the reflux from Ozempic    Regarding the palpitation, it could be related to the tachyarrhythmia and hopefully beta-blockers should be able to control    Based on the results, further recommendations will be made               Electronically signed by Alanna Gutierrez MD October 25, 2023 13:54 EDT

## 2023-11-22 DIAGNOSIS — I10 BENIGN ESSENTIAL HTN: ICD-10-CM

## 2023-11-29 ENCOUNTER — TELEPHONE (OUTPATIENT)
Dept: FAMILY MEDICINE | Facility: CLINIC | Age: 53
End: 2023-11-29
Payer: COMMERCIAL

## 2023-11-29 NOTE — TELEPHONE ENCOUNTER
Pt states seen  regarding shoulder problems was told had to tears and rotator surgery recommended. Pt requesting second opinion.    States would like to wait to return of pcp

## 2023-12-07 ENCOUNTER — TELEPHONE (OUTPATIENT)
Dept: INTERNAL MEDICINE | Facility: CLINIC | Age: 53
End: 2023-12-07
Payer: COMMERCIAL

## 2023-12-07 DIAGNOSIS — G89.29 CHRONIC LEFT SHOULDER PAIN: Primary | ICD-10-CM

## 2023-12-07 DIAGNOSIS — M25.512 CHRONIC LEFT SHOULDER PAIN: Primary | ICD-10-CM

## 2023-12-07 NOTE — TELEPHONE ENCOUNTER
----- Message from Angelita Castaneda MA sent at 2023 12:53 PM CST -----  Analia Apodaca  MRN: [id number]  : 1970  PCP: Shital Loera  Home Phone      [phone]  Work Phone      Not on file.  Mobile          [phone]      MESSAGE:     Patient would like a referral to Dr Chavez for a second opinion.    Please Advise:  [phone]

## 2024-01-25 ENCOUNTER — OFFICE VISIT (OUTPATIENT)
Dept: INTERNAL MEDICINE | Facility: CLINIC | Age: 54
End: 2024-01-25
Payer: COMMERCIAL

## 2024-01-25 VITALS
HEART RATE: 84 BPM | SYSTOLIC BLOOD PRESSURE: 122 MMHG | DIASTOLIC BLOOD PRESSURE: 70 MMHG | OXYGEN SATURATION: 98 % | RESPIRATION RATE: 18 BRPM | WEIGHT: 199.75 LBS | HEIGHT: 65 IN | BODY MASS INDEX: 33.28 KG/M2

## 2024-01-25 DIAGNOSIS — Z01.818 PRE-OP EXAM: Primary | ICD-10-CM

## 2024-01-25 LAB
BASOPHILS # BLD AUTO: 0.07 K/UL (ref 0–0.2)
BASOPHILS NFR BLD: 1.4 % (ref 0–1.9)
DIFFERENTIAL METHOD BLD: ABNORMAL
EOSINOPHIL # BLD AUTO: 0.2 K/UL (ref 0–0.5)
EOSINOPHIL NFR BLD: 4.9 % (ref 0–8)
ERYTHROCYTE [DISTWIDTH] IN BLOOD BY AUTOMATED COUNT: 16.8 % (ref 11.5–14.5)
HCT VFR BLD AUTO: 37.1 % (ref 37–48.5)
HGB BLD-MCNC: 11.2 G/DL (ref 12–16)
IMM GRANULOCYTES # BLD AUTO: 0.01 K/UL (ref 0–0.04)
IMM GRANULOCYTES NFR BLD AUTO: 0.2 % (ref 0–0.5)
LYMPHOCYTES # BLD AUTO: 1.7 K/UL (ref 1–4.8)
LYMPHOCYTES NFR BLD: 35.1 % (ref 18–48)
MCH RBC QN AUTO: 23.6 PG (ref 27–31)
MCHC RBC AUTO-ENTMCNC: 30.2 G/DL (ref 32–36)
MCV RBC AUTO: 78 FL (ref 82–98)
MONOCYTES # BLD AUTO: 0.5 K/UL (ref 0.3–1)
MONOCYTES NFR BLD: 11.1 % (ref 4–15)
NEUTROPHILS # BLD AUTO: 2.3 K/UL (ref 1.8–7.7)
NEUTROPHILS NFR BLD: 47.3 % (ref 38–73)
NRBC BLD-RTO: 0 /100 WBC
PLATELET # BLD AUTO: 264 K/UL (ref 150–450)
PMV BLD AUTO: 11.1 FL (ref 9.2–12.9)
RBC # BLD AUTO: 4.74 M/UL (ref 4–5.4)
WBC # BLD AUTO: 4.87 K/UL (ref 3.9–12.7)

## 2024-01-25 PROCEDURE — 85025 COMPLETE CBC W/AUTO DIFF WBC: CPT | Performed by: NURSE PRACTITIONER

## 2024-01-25 PROCEDURE — 3074F SYST BP LT 130 MM HG: CPT | Mod: CPTII,S$GLB,, | Performed by: NURSE PRACTITIONER

## 2024-01-25 PROCEDURE — 3078F DIAST BP <80 MM HG: CPT | Mod: CPTII,S$GLB,, | Performed by: NURSE PRACTITIONER

## 2024-01-25 PROCEDURE — 99999 PR PBB SHADOW E&M-EST. PATIENT-LVL IV: CPT | Mod: PBBFAC,,, | Performed by: NURSE PRACTITIONER

## 2024-01-25 PROCEDURE — 36415 COLL VENOUS BLD VENIPUNCTURE: CPT | Performed by: NURSE PRACTITIONER

## 2024-01-25 PROCEDURE — 3008F BODY MASS INDEX DOCD: CPT | Mod: CPTII,S$GLB,, | Performed by: NURSE PRACTITIONER

## 2024-01-25 PROCEDURE — 1159F MED LIST DOCD IN RCRD: CPT | Mod: CPTII,S$GLB,, | Performed by: NURSE PRACTITIONER

## 2024-01-25 PROCEDURE — 99214 OFFICE O/P EST MOD 30 MIN: CPT | Mod: S$GLB,,, | Performed by: NURSE PRACTITIONER

## 2024-01-25 NOTE — PROGRESS NOTES
Subjective:       Patient ID: Analia Apodaca is a 53 y.o. female.    Chief Complaint: surgery clerarance (L. Shoulder- 2/6 )    Patient is known, to me and presents with   Chief Complaint   Patient presents with    surgery clerarance     L. Shoulder- 2/6    .  Denies chest pain and shortness of breath.  Here for pre op for left shoulder sx. Only needs EKG and cbc.    HPI  Review of Systems   Constitutional:  Negative for activity change, appetite change, fatigue, fever and unexpected weight change.   HENT:  Negative for congestion, ear discharge, ear pain, hearing loss, postnasal drip and tinnitus.    Eyes:  Negative for photophobia, pain and visual disturbance.   Respiratory:  Negative for cough, shortness of breath, wheezing and stridor.    Cardiovascular:  Negative for chest pain, palpitations and leg swelling.   Gastrointestinal:  Negative for abdominal distention.   Genitourinary:  Negative for difficulty urinating, dysuria, frequency, hematuria and urgency.   Musculoskeletal:  Positive for arthralgias. Negative for back pain, gait problem, joint swelling and neck pain.   Skin: Negative.  Negative for color change, pallor, rash and wound.   Neurological:  Negative for dizziness, seizures, syncope, weakness, light-headedness, numbness and headaches.   Hematological:  Negative for adenopathy. Does not bruise/bleed easily.   Psychiatric/Behavioral:  Negative for behavioral problems, confusion, dysphoric mood, hallucinations, sleep disturbance and suicidal ideas. The patient is not nervous/anxious.        Objective:      Physical Exam  Constitutional:       General: She is not in acute distress.     Appearance: She is well-developed.   HENT:      Head: Normocephalic and atraumatic.      Right Ear: Tympanic membrane and external ear normal.      Left Ear: Tympanic membrane and external ear normal.      Nose: Nose normal.      Mouth/Throat:      Mouth: Mucous membranes are moist.      Pharynx: No  oropharyngeal exudate.   Eyes:      General: No scleral icterus.        Right eye: No discharge.         Left eye: No discharge.      Conjunctiva/sclera: Conjunctivae normal.      Pupils: Pupils are equal, round, and reactive to light.   Neck:      Thyroid: No thyromegaly.      Vascular: No JVD.   Cardiovascular:      Rate and Rhythm: Normal rate and regular rhythm.      Heart sounds: Normal heart sounds. No murmur heard.     No friction rub. No gallop.   Pulmonary:      Effort: Pulmonary effort is normal. No respiratory distress.      Breath sounds: Normal breath sounds. No stridor. No wheezing or rales.   Chest:      Chest wall: No tenderness.   Abdominal:      General: Bowel sounds are normal. There is no distension.      Palpations: Abdomen is soft. There is no mass.      Tenderness: There is no abdominal tenderness. There is no right CVA tenderness, left CVA tenderness, guarding or rebound.      Hernia: No hernia is present.   Musculoskeletal:         General: No swelling, tenderness, deformity or signs of injury.      Left shoulder: Decreased range of motion.      Cervical back: Normal range of motion and neck supple.      Right lower leg: No edema.      Left lower leg: No edema.   Lymphadenopathy:      Cervical: No cervical adenopathy.   Skin:     General: Skin is warm and dry.      Capillary Refill: Capillary refill takes less than 2 seconds.      Coloration: Skin is not jaundiced or pale.      Findings: No bruising, erythema, lesion or rash.   Neurological:      General: No focal deficit present.      Mental Status: She is alert and oriented to person, place, and time.      Cranial Nerves: No cranial nerve deficit.      Sensory: No sensory deficit.      Motor: No weakness or abnormal muscle tone.      Coordination: Coordination normal.      Gait: Gait normal.      Deep Tendon Reflexes: Reflexes are normal and symmetric. Reflexes normal.   Psychiatric:         Attention and Perception: She does not perceive  "auditory or visual hallucinations.         Mood and Affect: Mood and affect normal. Mood is not anxious or depressed. Affect is not tearful.         Behavior: Behavior normal.         Thought Content: Thought content normal. Thought content does not include homicidal or suicidal ideation. Thought content does not include homicidal or suicidal plan.         Judgment: Judgment normal.         Assessment:       1. Pre-op exam        Plan:   1. Pre-op exam  -     EKG 12-lead; Future  -     CBC Auto Differential; Future; Expected date: 01/25/2024       "This note will not be shared with the patient."  EKG nsr no ischemia   Once I review cbc will clear   Rtc as scheduled  "

## 2024-01-26 ENCOUNTER — TELEPHONE (OUTPATIENT)
Dept: INTERNAL MEDICINE | Facility: CLINIC | Age: 54
End: 2024-01-26
Payer: COMMERCIAL

## 2024-01-26 NOTE — TELEPHONE ENCOUNTER
----- Message from Angelita Castaneda MA sent at 2024  8:08 AM CST -----  Analia Apodaca  MRN: [id number]  : 1970  PCP: Shital Loera  Home Phone      [phone]  Work Phone      Not on file.  Mobile          [phone]      MESSAGE:     Please review CBC to clear patient for surgery.    Please Advise:  [phone]

## 2024-03-04 ENCOUNTER — PATIENT OUTREACH (OUTPATIENT)
Dept: ADMINISTRATIVE | Facility: HOSPITAL | Age: 54
End: 2024-03-04
Payer: COMMERCIAL

## 2024-03-04 NOTE — PROGRESS NOTES
Population Health Chart Review & Patient Outreach Details      Additional [facility] Notes:    Attempted to contact patient to discuss Cervical Cancer Screening.  No answer, left voicemail for patient to return call to clinic.    Received external Lipid Panel collected/ completed on 2023, updated to .     NOV with PCP: Not Scheduled.  LOV with PCP: 2024.           Updates Requested / Reviewed:      Updated Care Coordination Note, Care Everywhere, , External Sources: [facility] and [facility], Care Team Updated, Removed  or Duplicate Orders, and Immunizations Reconciliation Completed or Queried: [facility] Topics Overdue:    Health Maintenance Due   Topic Date Due    Hepatitis C Screening  Never done    HIV Screening  Never done    Hemoglobin A1c (Diabetic Prevention Screening)  Never done    Cervical Cancer Screening  2022    Shingles Vaccine (2 of 2) 2022    Influenza Vaccine (1) 2023    COVID-19 Vaccine ( season) 2023         Health Maintenance Topic(s) Outreach Outcomes & Actions Taken:    Cervical Cancer Screening - Outreach Outcomes & Actions Taken  : No answer, left voicemail for patient to return call to clinic.     Labs:  Uploaded Lipid panel collected on 2023 to .

## 2024-03-25 ENCOUNTER — CLINICAL SUPPORT (OUTPATIENT)
Dept: INTERNAL MEDICINE | Facility: CLINIC | Age: 54
End: 2024-03-25
Payer: COMMERCIAL

## 2024-03-25 DIAGNOSIS — I10 BENIGN ESSENTIAL HTN: Primary | ICD-10-CM

## 2024-03-25 DIAGNOSIS — D50.9 IRON DEFICIENCY ANEMIA, UNSPECIFIED IRON DEFICIENCY ANEMIA TYPE: ICD-10-CM

## 2024-03-25 LAB
ALBUMIN SERPL BCP-MCNC: 3.8 G/DL (ref 3.5–5.2)
ALP SERPL-CCNC: 77 U/L (ref 55–135)
ALT SERPL W/O P-5'-P-CCNC: 12 U/L (ref 10–44)
ANION GAP SERPL CALC-SCNC: 10 MMOL/L (ref 8–16)
AST SERPL-CCNC: 18 U/L (ref 10–40)
BASOPHILS # BLD AUTO: 0.07 K/UL (ref 0–0.2)
BASOPHILS NFR BLD: 1.4 % (ref 0–1.9)
BILIRUB SERPL-MCNC: 0.3 MG/DL (ref 0.1–1)
BUN SERPL-MCNC: 12 MG/DL (ref 6–20)
CALCIUM SERPL-MCNC: 9.6 MG/DL (ref 8.7–10.5)
CHLORIDE SERPL-SCNC: 108 MMOL/L (ref 95–110)
CHOLEST SERPL-MCNC: 176 MG/DL (ref 120–199)
CHOLEST/HDLC SERPL: 2.7 {RATIO} (ref 2–5)
CO2 SERPL-SCNC: 22 MMOL/L (ref 23–29)
CREAT SERPL-MCNC: 0.8 MG/DL (ref 0.5–1.4)
DIFFERENTIAL METHOD BLD: ABNORMAL
EOSINOPHIL # BLD AUTO: 0.2 K/UL (ref 0–0.5)
EOSINOPHIL NFR BLD: 3 % (ref 0–8)
ERYTHROCYTE [DISTWIDTH] IN BLOOD BY AUTOMATED COUNT: 17.7 % (ref 11.5–14.5)
EST. GFR  (NO RACE VARIABLE): >60 ML/MIN/1.73 M^2
GLUCOSE SERPL-MCNC: 87 MG/DL (ref 70–110)
HCT VFR BLD AUTO: 40 % (ref 37–48.5)
HDLC SERPL-MCNC: 65 MG/DL (ref 40–75)
HDLC SERPL: 36.9 % (ref 20–50)
HGB BLD-MCNC: 12 G/DL (ref 12–16)
IMM GRANULOCYTES # BLD AUTO: 0.01 K/UL (ref 0–0.04)
IMM GRANULOCYTES NFR BLD AUTO: 0.2 % (ref 0–0.5)
LDLC SERPL CALC-MCNC: 100.2 MG/DL (ref 63–159)
LYMPHOCYTES # BLD AUTO: 2 K/UL (ref 1–4.8)
LYMPHOCYTES NFR BLD: 39.4 % (ref 18–48)
MCH RBC QN AUTO: 24.3 PG (ref 27–31)
MCHC RBC AUTO-ENTMCNC: 30 G/DL (ref 32–36)
MCV RBC AUTO: 81 FL (ref 82–98)
MONOCYTES # BLD AUTO: 0.5 K/UL (ref 0.3–1)
MONOCYTES NFR BLD: 10.4 % (ref 4–15)
NEUTROPHILS # BLD AUTO: 2.3 K/UL (ref 1.8–7.7)
NEUTROPHILS NFR BLD: 45.6 % (ref 38–73)
NONHDLC SERPL-MCNC: 111 MG/DL
NRBC BLD-RTO: 0 /100 WBC
PLATELET # BLD AUTO: 260 K/UL (ref 150–450)
PMV BLD AUTO: 11.1 FL (ref 9.2–12.9)
POTASSIUM SERPL-SCNC: 3.9 MMOL/L (ref 3.5–5.1)
PROT SERPL-MCNC: 7.1 G/DL (ref 6–8.4)
RBC # BLD AUTO: 4.94 M/UL (ref 4–5.4)
SODIUM SERPL-SCNC: 140 MMOL/L (ref 136–145)
TRIGL SERPL-MCNC: 54 MG/DL (ref 30–150)
TSH SERPL DL<=0.005 MIU/L-ACNC: 2.3 UIU/ML (ref 0.4–4)
WBC # BLD AUTO: 5 K/UL (ref 3.9–12.7)

## 2024-03-25 PROCEDURE — 80053 COMPREHEN METABOLIC PANEL: CPT | Performed by: NURSE PRACTITIONER

## 2024-03-25 PROCEDURE — 36415 COLL VENOUS BLD VENIPUNCTURE: CPT | Performed by: NURSE PRACTITIONER

## 2024-03-25 PROCEDURE — 85025 COMPLETE CBC W/AUTO DIFF WBC: CPT | Performed by: NURSE PRACTITIONER

## 2024-03-25 PROCEDURE — 84443 ASSAY THYROID STIM HORMONE: CPT | Performed by: NURSE PRACTITIONER

## 2024-03-25 PROCEDURE — 80061 LIPID PANEL: CPT | Performed by: NURSE PRACTITIONER

## 2024-04-01 ENCOUNTER — PATIENT MESSAGE (OUTPATIENT)
Dept: INTERNAL MEDICINE | Facility: CLINIC | Age: 54
End: 2024-04-01
Payer: COMMERCIAL

## 2024-05-15 ENCOUNTER — PATIENT OUTREACH (OUTPATIENT)
Dept: ADMINISTRATIVE | Facility: HOSPITAL | Age: 54
End: 2024-05-15
Payer: COMMERCIAL

## 2024-06-26 ENCOUNTER — TELEPHONE (OUTPATIENT)
Dept: INTERNAL MEDICINE | Facility: CLINIC | Age: 54
End: 2024-06-26
Payer: COMMERCIAL

## 2024-06-26 NOTE — TELEPHONE ENCOUNTER
----- Message from Angelita Castaneda MA sent at 2024  8:12 AM CDT -----  Analia Apodaca  MRN: [id number]  : 1970  PCP: Shital Loera  Home Phone      [phone]  Work Phone      Not on file.  Mobile          [phone]      MESSAGE:     Patient c/o what she thinks is a ganglion cyst?  Any recommendations?    Who would she see?  She says its very painful.  Please Advise: [phone]

## 2024-09-18 DIAGNOSIS — Z12.31 OTHER SCREENING MAMMOGRAM: ICD-10-CM

## 2024-11-18 ENCOUNTER — PATIENT MESSAGE (OUTPATIENT)
Dept: ADMINISTRATIVE | Facility: HOSPITAL | Age: 54
End: 2024-11-18
Payer: COMMERCIAL

## 2024-12-06 ENCOUNTER — PATIENT MESSAGE (OUTPATIENT)
Dept: ADMINISTRATIVE | Facility: HOSPITAL | Age: 54
End: 2024-12-06
Payer: COMMERCIAL

## 2025-02-12 LAB
CHOL/HDLC RATIO: 2.5
CHOLEST SERPL-MSCNC: 181 MG/DL (ref 0–200)
HDLC SERPL-MCNC: 73 MG/DL (ref 35–70)
LDL CHOLESTEROL DIRECT: 93 MG/DL
LDLC SERPL CALC-MCNC: ABNORMAL MG/DL
NONHDLC SERPL-MCNC: 108 MG/DL
TRIGL SERPL-MCNC: 104 MG/DL (ref 40–160)
VLDL CHOLESTEROL: 21 MG/DL

## 2025-03-03 ENCOUNTER — OFFICE VISIT (OUTPATIENT)
Dept: INTERNAL MEDICINE | Facility: CLINIC | Age: 55
End: 2025-03-03
Payer: COMMERCIAL

## 2025-03-03 VITALS
SYSTOLIC BLOOD PRESSURE: 116 MMHG | WEIGHT: 221.81 LBS | HEIGHT: 66 IN | BODY MASS INDEX: 35.65 KG/M2 | DIASTOLIC BLOOD PRESSURE: 76 MMHG | OXYGEN SATURATION: 98 % | RESPIRATION RATE: 20 BRPM | HEART RATE: 87 BPM

## 2025-03-03 DIAGNOSIS — B95.8 STAPH INFECTION: Primary | ICD-10-CM

## 2025-03-03 DIAGNOSIS — Z12.39 ENCOUNTER FOR SCREENING FOR MALIGNANT NEOPLASM OF BREAST, UNSPECIFIED SCREENING MODALITY: ICD-10-CM

## 2025-03-03 PROCEDURE — 99999 PR PBB SHADOW E&M-EST. PATIENT-LVL III: CPT | Mod: PBBFAC,,, | Performed by: NURSE PRACTITIONER

## 2025-03-03 RX ORDER — MUPIROCIN 20 MG/G
OINTMENT TOPICAL 2 TIMES DAILY
Qty: 30 G | Refills: 1 | Status: SHIPPED | OUTPATIENT
Start: 2025-03-03

## 2025-03-03 NOTE — PROGRESS NOTES
History of Present Illness    CHIEF COMPLAINT:  Patient presents today with a bump on her back.    HISTORY OF PRESENT ILLNESS:  She reports a skin lesion on her back behind the bra strap present for approximately 3 weeks. The lesion has a small head and is pruritic but not painful. Her mother applied a treatment to the lesion multiple times, with the last application at the beginning of last week. She denies any history of insect bites or stings to the area.    MEDICAL HISTORY:  She has a history of recent rotator cuff surgery.    WEIGHT MANAGEMENT:  She reports regaining previously lost weight, which she attributes to not returning to the gym following her shoulder surgery.    LABS:  Thyroid function tests were normal.          Physical Exam    General: No acute distress. Well-developed. Well-nourished.  Cardiovascular: Regular rate. Regular rhythm. No murmurs. No rubs. No gallops. Normal S1, S2.  Respiratory: Normal respiratory effort. Clear to auscultation bilaterally. No rales. No rhonchi. No wheezing.  Skin: Warm. Dry. No rash. Small bump on back, possible abscess with no drainable head.          Assessment & Plan    IMPRESSION:  - Assessed bump on patient's back, likely a minor skin infection or insect bite  - Considered possibility of small abscess but determined no drainage needed  - Will treat empirically with topical antibiotic ointment  - Reviewed recent lab results from Dr. George, noting thyroid and cholesterol levels    POTENTIAL ABSCESS OR STAPH INFECTION:  - Assessed the bump on the patient's back, present for about 3 weeks, as a potential small abscess or staph infection.  - Noted the bump is located behind the bra strap, itches but does not cause pain.  - Explained that the bump could be a result of a small staph infection or an unnoticed insect sting.  - Prescribed Bactroban ointment to be applied twice daily to the affected area on the back.  - Instructed the patient to cover the bump with a  "band-aid to prevent friction from the bra strap.  - Advised to follow up as needed if the bump does not improve with ointment treatment.    HYPERLIPIDEMIA:  - Reviewed recent labs confirming high cholesterol levels.  - Discussed the preventative nature of cholesterol medication prescribed by Dr. George.  - Noted that the patient has been prescribed cholesterol-lowering medication but has not started taking it yet.  - Emphasized the importance of starting the prescribed medication to address the hyperlipidemia.         This note was generated with the assistance of ambient listening technology. Verbal consent was obtained by the patient and accompanying visitor(s) for the recording of patient appointment to facilitate this note. I attest to having reviewed and edited the generated note for accuracy, though some syntax or spelling errors may persist. Please contact the author of this note for any clarification.      "This note will not be shared with the patient."    1. Staph infection  mupirocin (BACTROBAN) 2 % ointment      2. Encounter for screening for malignant neoplasm of breast, unspecified screening modality  Mammo Digital Screening Bilat       Rtc as scheduled  "

## 2025-03-18 ENCOUNTER — HOSPITAL ENCOUNTER (OUTPATIENT)
Dept: RADIOLOGY | Facility: HOSPITAL | Age: 55
Discharge: HOME | End: 2025-03-18
Attending: NURSE PRACTITIONER
Payer: COMMERCIAL

## 2025-03-18 VITALS — BODY MASS INDEX: 35.52 KG/M2 | WEIGHT: 221 LBS | HEIGHT: 66 IN

## 2025-03-18 DIAGNOSIS — Z12.39 ENCOUNTER FOR SCREENING FOR MALIGNANT NEOPLASM OF BREAST, UNSPECIFIED SCREENING MODALITY: ICD-10-CM

## 2025-03-18 PROCEDURE — 77067 SCR MAMMO BI INCL CAD: CPT | Mod: TC

## 2025-04-09 ENCOUNTER — LAB VISIT (OUTPATIENT)
Dept: LAB | Facility: HOSPITAL | Age: 55
End: 2025-04-09
Attending: NURSE PRACTITIONER
Payer: COMMERCIAL

## 2025-04-09 DIAGNOSIS — R10.13 EPIGASTRIC ABDOMINAL PAIN: ICD-10-CM

## 2025-04-09 DIAGNOSIS — K21.9 GASTROESOPHAGEAL REFLUX DISEASE, UNSPECIFIED WHETHER ESOPHAGITIS PRESENT: ICD-10-CM

## 2025-04-09 LAB
ABSOLUTE EOSINOPHIL (OHS): 0.12 K/UL
ABSOLUTE MONOCYTE (OHS): 0.67 K/UL (ref 0.3–1)
ABSOLUTE NEUTROPHIL COUNT (OHS): 3.05 K/UL (ref 1.8–7.7)
ALBUMIN SERPL BCP-MCNC: 4.5 G/DL (ref 3.5–5.2)
ALP SERPL-CCNC: 67 UNIT/L (ref 40–150)
ALT SERPL W/O P-5'-P-CCNC: 20 UNIT/L (ref 10–44)
ANION GAP (OHS): 9 MMOL/L (ref 8–16)
AST SERPL-CCNC: 20 UNIT/L (ref 11–45)
BASOPHILS # BLD AUTO: 0.05 K/UL
BASOPHILS NFR BLD AUTO: 0.9 %
BILIRUB SERPL-MCNC: 0.6 MG/DL (ref 0.1–1)
BUN SERPL-MCNC: 12 MG/DL (ref 6–20)
CALCIUM SERPL-MCNC: 9.7 MG/DL (ref 8.7–10.5)
CHLORIDE SERPL-SCNC: 105 MMOL/L (ref 95–110)
CO2 SERPL-SCNC: 28 MMOL/L (ref 23–29)
CREAT SERPL-MCNC: 0.8 MG/DL (ref 0.5–1.4)
ERYTHROCYTE [DISTWIDTH] IN BLOOD BY AUTOMATED COUNT: 12.8 % (ref 11.5–14.5)
GFR SERPLBLD CREATININE-BSD FMLA CKD-EPI: >60 ML/MIN/1.73/M2
GLUCOSE SERPL-MCNC: 88 MG/DL (ref 70–110)
HCT VFR BLD AUTO: 44 % (ref 37–48.5)
HGB BLD-MCNC: 14.4 GM/DL (ref 12–16)
IMM GRANULOCYTES # BLD AUTO: 0.01 K/UL (ref 0–0.04)
IMM GRANULOCYTES NFR BLD AUTO: 0.2 % (ref 0–0.5)
LYMPHOCYTES # BLD AUTO: 1.8 K/UL (ref 1–4.8)
MCH RBC QN AUTO: 27.2 PG (ref 27–31)
MCHC RBC AUTO-ENTMCNC: 32.7 G/DL (ref 32–36)
MCV RBC AUTO: 83 FL (ref 82–98)
NUCLEATED RBC (/100WBC) (OHS): 0 /100 WBC
PLATELET # BLD AUTO: 196 K/UL (ref 150–450)
PMV BLD AUTO: 9.6 FL (ref 9.2–12.9)
POTASSIUM SERPL-SCNC: 4.3 MMOL/L (ref 3.5–5.1)
PROT SERPL-MCNC: 7.8 GM/DL (ref 6–8.4)
RBC # BLD AUTO: 5.29 M/UL (ref 4–5.4)
RELATIVE EOSINOPHIL (OHS): 2.1 %
RELATIVE LYMPHOCYTE (OHS): 31.6 % (ref 18–48)
RELATIVE MONOCYTE (OHS): 11.8 % (ref 4–15)
RELATIVE NEUTROPHIL (OHS): 53.4 % (ref 38–73)
SODIUM SERPL-SCNC: 142 MMOL/L (ref 136–145)
WBC # BLD AUTO: 5.7 K/UL (ref 3.9–12.7)

## 2025-04-09 PROCEDURE — 36415 COLL VENOUS BLD VENIPUNCTURE: CPT

## 2025-04-09 PROCEDURE — 85025 COMPLETE CBC W/AUTO DIFF WBC: CPT

## 2025-04-09 PROCEDURE — 84295 ASSAY OF SERUM SODIUM: CPT

## 2025-04-24 ENCOUNTER — PATIENT OUTREACH (OUTPATIENT)
Dept: ADMINISTRATIVE | Facility: HOSPITAL | Age: 55
End: 2025-04-24
Payer: COMMERCIAL

## 2025-04-24 DIAGNOSIS — Z13.1 SCREENING FOR DIABETES MELLITUS (DM): Primary | ICD-10-CM

## 2025-04-24 DIAGNOSIS — E66.811 OBESITY (BMI 30.0-34.9): ICD-10-CM

## 2025-04-24 NOTE — PROGRESS NOTES
Portal active: yes  Chart reviewed, immunization record updated.  No new results noted on [facility] or [facility] web site.  Care Everywhere updated.   Smoking Cessation Program Eligibility: former  Patient care coordination note  LOV with PCP 3/3/25  Patient scheduled for pap smear, lipid was sent to be uploaded from care everywhere by [facility]  and patient will do lab prior to next visit.

## 2025-04-25 ENCOUNTER — HOSPITAL ENCOUNTER (OUTPATIENT)
Dept: RADIOLOGY | Facility: HOSPITAL | Age: 55
Discharge: HOME | End: 2025-04-25
Attending: NURSE PRACTITIONER
Payer: COMMERCIAL

## 2025-04-25 DIAGNOSIS — K21.9 GERD (GASTROESOPHAGEAL REFLUX DISEASE): ICD-10-CM

## 2025-04-25 DIAGNOSIS — R10.13 ABDOMINAL PAIN, EPIGASTRIC: ICD-10-CM

## 2025-04-25 PROCEDURE — 76705 ECHO EXAM OF ABDOMEN: CPT | Mod: TC

## 2025-04-25 PROCEDURE — 76705 ECHO EXAM OF ABDOMEN: CPT | Mod: 26,,, | Performed by: RADIOLOGY

## 2025-05-22 ENCOUNTER — OFFICE VISIT (OUTPATIENT)
Dept: OBSTETRICS AND GYNECOLOGY | Facility: CLINIC | Age: 55
End: 2025-05-22
Payer: COMMERCIAL

## 2025-05-22 VITALS
HEIGHT: 66 IN | BODY MASS INDEX: 32.94 KG/M2 | DIASTOLIC BLOOD PRESSURE: 68 MMHG | SYSTOLIC BLOOD PRESSURE: 118 MMHG | WEIGHT: 204.94 LBS | HEART RATE: 70 BPM

## 2025-05-22 DIAGNOSIS — Z78.0 POSTMENOPAUSAL STATE: ICD-10-CM

## 2025-05-22 DIAGNOSIS — Z12.4 CERVICAL CANCER SCREENING: ICD-10-CM

## 2025-05-22 DIAGNOSIS — Z01.419 ENCOUNTER FOR GYNECOLOGICAL EXAMINATION WITHOUT ABNORMAL FINDING: Primary | ICD-10-CM

## 2025-05-22 PROCEDURE — 99999 PR PBB SHADOW E&M-EST. PATIENT-LVL III: CPT | Mod: PBBFAC,,, | Performed by: OBSTETRICS & GYNECOLOGY

## 2025-05-22 NOTE — PROGRESS NOTES
Subjective:       Patient ID: Analia Apodaca is a 54 y.o. female.    Chief Complaint:  Annual Exam (Well woman exam, Mmg 25 (WNL))      History of Present Illness  Patient presents for annual exam.  Patient had a normal mammogram in March of last year.  She is otherwise without gyn complaints.    Menstrual History:  OB History          1    Para   1    Term   1            AB        Living   1         SAB        IAB        Ectopic        Multiple        Live Births   1                Menarche age:  Patient's last menstrual period was 2016.         Review of Systems  Review of Systems   Constitutional:  Negative for activity change, appetite change, chills, diaphoresis, fatigue, fever and unexpected weight change.   HENT:  Negative for congestion, dental problem, drooling, ear discharge, ear pain, facial swelling, hearing loss, mouth sores, nosebleeds, postnasal drip, rhinorrhea, sinus pressure, sneezing, sore throat, tinnitus, trouble swallowing and voice change.    Eyes:  Negative for photophobia, pain, discharge, redness, itching and visual disturbance.   Respiratory:  Negative for apnea, cough, choking, chest tightness, shortness of breath, wheezing and stridor.    Cardiovascular:  Negative for chest pain, palpitations and leg swelling.   Gastrointestinal:  Positive for abdominal pain. Negative for abdominal distention, anal bleeding, blood in stool, constipation, diarrhea, nausea, rectal pain and vomiting.   Endocrine: Negative for cold intolerance, heat intolerance, polydipsia, polyphagia and polyuria.   Genitourinary:  Negative for decreased urine volume, difficulty urinating, dyspareunia, dysuria, enuresis, flank pain, frequency, genital sores, hematuria, menstrual problem, pelvic pain, urgency, vaginal bleeding, vaginal discharge and vaginal pain.   Musculoskeletal:  Negative for arthralgias, back pain, gait problem, joint swelling, myalgias, neck pain and neck stiffness.   Skin:   Negative for color change, pallor, rash and wound.   Allergic/Immunologic: Negative for environmental allergies, food allergies and immunocompromised state.   Neurological:  Negative for dizziness, tremors, seizures, syncope, facial asymmetry, speech difficulty, weakness, light-headedness, numbness and headaches.   Hematological:  Negative for adenopathy. Does not bruise/bleed easily.   Psychiatric/Behavioral:  Negative for agitation, behavioral problems, confusion, decreased concentration, dysphoric mood, hallucinations, self-injury, sleep disturbance and suicidal ideas. The patient is not nervous/anxious and is not hyperactive.          Objective:      Physical Exam  Vitals and nursing note reviewed. Exam conducted with a chaperone present.   Constitutional:       Appearance: She is well-developed.   Neck:      Thyroid: No thyromegaly.   Cardiovascular:      Rate and Rhythm: Normal rate and regular rhythm.   Pulmonary:      Effort: Pulmonary effort is normal.      Breath sounds: Normal breath sounds.   Chest:   Breasts:     Breasts are symmetrical.      Right: No inverted nipple, mass, nipple discharge, skin change or tenderness.      Left: No inverted nipple, mass, nipple discharge, skin change or tenderness.   Abdominal:      General: Bowel sounds are normal.      Palpations: Abdomen is soft. There is no mass.      Tenderness: There is no abdominal tenderness.      Hernia: There is no hernia in the left inguinal area or right inguinal area.   Genitourinary:     General: Normal vulva.      Labia:         Right: No rash, tenderness, lesion or injury.         Left: No rash, tenderness, lesion or injury.       Urethra: No prolapse, urethral pain, urethral swelling or urethral lesion.      Vagina: No signs of injury and foreign body. No vaginal discharge, erythema, tenderness, bleeding, lesions or prolapsed vaginal walls.      Cervix: No cervical motion tenderness, discharge, friability, lesion, erythema, cervical  bleeding or eversion.      Uterus: Not deviated, not enlarged, not fixed, not tender and no uterine prolapse.       Adnexa:         Right: No mass, tenderness or fullness.          Left: No mass, tenderness or fullness.        Rectum: No external hemorrhoid.   Musculoskeletal:         General: Normal range of motion.   Lymphadenopathy:      Lower Body: No right inguinal adenopathy. No left inguinal adenopathy.   Skin:     General: Skin is dry.   Neurological:      Mental Status: She is alert and oriented to person, place, and time.      Deep Tendon Reflexes: Reflexes are normal and symmetric.   Psychiatric:         Behavior: Behavior normal.         Thought Content: Thought content normal.         Judgment: Judgment normal.         Assessment:        1. Encounter for gynecological examination without abnormal finding    2. Cervical cancer screening    3. Postmenopausal state                Plan:         Analia was seen today for annual exam.    Diagnoses and all orders for this visit:    Encounter for gynecological examination without abnormal finding    Cervical cancer screening  -     Liquid-Based Pap Smear, Screening    Postmenopausal state

## 2025-05-28 ENCOUNTER — RESULTS FOLLOW-UP (OUTPATIENT)
Dept: OBSTETRICS AND GYNECOLOGY | Facility: CLINIC | Age: 55
End: 2025-05-28